# Patient Record
Sex: FEMALE | Race: WHITE | NOT HISPANIC OR LATINO | Employment: FULL TIME | URBAN - METROPOLITAN AREA
[De-identification: names, ages, dates, MRNs, and addresses within clinical notes are randomized per-mention and may not be internally consistent; named-entity substitution may affect disease eponyms.]

---

## 2018-08-15 RX ORDER — CYCLOSPORINE 0.5 MG/ML
EMULSION OPHTHALMIC
Qty: 60 ML | Refills: 2 | OUTPATIENT
Start: 2018-08-15

## 2018-08-24 RX ORDER — CYCLOSPORINE 0.5 MG/ML
EMULSION OPHTHALMIC
Qty: 60 ML | Refills: 0 | OUTPATIENT
Start: 2018-08-24

## 2018-08-27 RX ORDER — CYCLOSPORINE 0.5 MG/ML
EMULSION OPHTHALMIC
Qty: 60 ML | Refills: 1 | OUTPATIENT
Start: 2018-08-27

## 2020-02-11 ENCOUNTER — TELEPHONE (OUTPATIENT)
Dept: CARDIOLOGY CLINIC | Facility: CLINIC | Age: 50
End: 2020-02-11

## 2020-02-12 NOTE — PROGRESS NOTES
Consultation - Cardiology     Benigno Mccoy 47 y o  female MRN: [id number]    Assessment/Plan:    1  HL with high TG  She is taking Vascepa 2 g bid since 12/19  Last TG she believes were around 380, this was before she started Vascepa  TG in 7/04 were 548, total 153, HDL 12  Lipid panel 12/11 total 154, HDL 9,   Lipid panel 3/12 total 175, HDL 7, , LDL 95  She reports Niacin caused hives/wheals  Regular fish oil caused burping and she couldn't tolerate it  She believes one of the statins caused diarrhea, can't recall which one  Will recheck lipid panel in 3/20, if TG still high, could consider addition of fenofibrate, she does not believe she has ever tried that  2  Family history of CAD in father  Currently not having any exertional symptoms which would require stress testing, but did advise modification of risk factors such as HL  3  Palpitations/SVT  Currently symptoms occurring at night, about 2 times a week on average  Does not believe she snores  Will order Zio patch to attempt to catch one of these episodes to rule out arrhythmias such as afib/flutter  1  Family history of early CAD  POCT ECG   2  Mixed dyslipidemia     3  Palpitations  AMB extended holter monitor   4  SVT (supraventricular tachycardia) (HCC)  AMB extended holter monitor    Lipid Panel with Direct LDL reflex       HPI: 47 y o  female who is referred by Dr Nyasia Wayne here for evaluation of fast heart beats/tachycardia  She reports since 12/19 she has been noticing palpitations that wake her from sleep, last 5 minutes to 30 minutes  Just feels like heart is beating faster, not irregular, and she doesn't feel well  No associated chest pain, SOB, nausea, diaphoresis  Not occurring daily, but maybe twice a week  She is exercising in the form of walking at lunch, walks for 30 minutes, no exertional CP or SOB  No orthopnea, uses 2 pillows to sleep, no PND  She has mild asthma  No LE edema   She has chronic insomnia which she feels began when she began having issues with hypothyroidism  Feels her insomnia is worse than previously  Still has her period  Does not believe she snores  Father had 2 MIs, first at age 79, second at 80  He had HL/high TG  Mother had DM, HTN, colon cancer  Review of Systems:    Review of Systems   Constitution: Positive for night sweats  Negative for chills, decreased appetite, diaphoresis, fever, malaise/fatigue, weight gain and weight loss  HENT: Negative for ear pain, hearing loss, hoarse voice, nosebleeds, sore throat and tinnitus  Eyes: Negative for blurred vision and pain  Cardiovascular: Negative  Negative for chest pain, claudication, cyanosis, dyspnea on exertion, irregular heartbeat, leg swelling, near-syncope, orthopnea, palpitations, paroxysmal nocturnal dyspnea and syncope  Respiratory: Negative for cough, hemoptysis, shortness of breath, sleep disturbances due to breathing, snoring, sputum production and wheezing  Hematologic/Lymphatic: Negative for adenopathy and bleeding problem  Does not bruise/bleed easily  Skin: Negative for color change, dry skin, flushing, itching, poor wound healing and rash  Musculoskeletal: Negative for arthritis, back pain, falls, joint pain, muscle cramps, muscle weakness, myalgias and neck pain  Gastrointestinal: Negative for abdominal pain, constipation, diarrhea, dysphagia, heartburn, hematemesis, hematochezia, melena, nausea and vomiting  Genitourinary: Negative for dysuria, frequency, hematuria, hesitancy, non-menstrual bleeding and urgency  Neurological: Negative for excessive daytime sleepiness, dizziness, focal weakness, headaches, light-headedness, loss of balance, numbness, paresthesias, tremors, vertigo and weakness  Psychiatric/Behavioral: Negative for altered mental status, depression and memory loss  The patient has insomnia  The patient is not nervous/anxious      Allergic/Immunologic: Negative for environmental allergies and persistent infections  Active Problems: There is no problem list on file for this patient  Historical Information   Past Medical History:   Diagnosis Date    Asthma     Hyperlipemia      History reviewed  No pertinent surgical history  Social History     Substance and Sexual Activity   Alcohol Use Yes    Comment: social      Social History     Substance and Sexual Activity   Drug Use Never     Social History     Tobacco Use   Smoking Status Former Smoker   Smokeless Tobacco Never Used       Family History:   Family History   Problem Relation Age of Onset    Colon cancer Mother     Diabetes Mother     Heart disease Father     Hyperlipidemia Father        No Known Allergies      Current Outpatient Medications:     ferrous sulfate 325 (65 Fe) mg tablet, Take 325 mg by mouth daily with breakfast, Disp: , Rfl:     Icosapent Ethyl (VASCEPA) 1 g CAPS, Take 1 g by mouth, Disp: , Rfl:     levothyroxine 50 mcg tablet, Take 50 mcg by mouth daily, Disp: , Rfl:     Objective   Vitals:   Vitals:    02/13/20 0823   BP: 128/82   BP Location: Left arm   Patient Position: Sitting   Cuff Size: Standard   Pulse: 81   SpO2: 98%   Weight: 90 7 kg (200 lb)   Height: 5' 6" (1 676 m)          Physical Exam:     GEN: Alert and oriented x 3, in no acute distress  Well appearing and well nourished  HEENT: Sclera anicteric, conjunctivae pink, mucous membranes moist  Oropharynx clear  NECK: Supple, no carotid bruits, no significant JVD  Trachea midline, no thyromegaly  HEART: Regular rhythm, normal S1 and S2, no murmurs, clicks, gallops or rubs  PMI nondisplaced, no thrills  LUNGS: Clear to auscultation bilaterally; no wheezes, rales, or rhonchi  No increased work of breathing or signs of respiratory distress  ABDOMEN: Obese, soft, nontender, nondistended, normoactive bowel sounds  EXTREMITIES: Skin warm and well perfused, no clubbing, cyanosis, or edema  NEURO: No focal findings   Normal gait  Normal speech  Mood and affect normal    SKIN: Normal without suspicious lesions on exposed skin      Lab Results:     No results found for: HGBA1C  No results found for: CHOL  No results found for: HDL  No results found for: LDLCALC  No results found for: TRIG  No results found for: CHOLHDL

## 2020-02-13 ENCOUNTER — TELEPHONE (OUTPATIENT)
Dept: CARDIOLOGY CLINIC | Facility: CLINIC | Age: 50
End: 2020-02-13

## 2020-02-13 ENCOUNTER — OFFICE VISIT (OUTPATIENT)
Dept: CARDIOLOGY CLINIC | Facility: CLINIC | Age: 50
End: 2020-02-13
Payer: COMMERCIAL

## 2020-02-13 VITALS
OXYGEN SATURATION: 98 % | WEIGHT: 200 LBS | BODY MASS INDEX: 32.14 KG/M2 | DIASTOLIC BLOOD PRESSURE: 82 MMHG | SYSTOLIC BLOOD PRESSURE: 128 MMHG | HEART RATE: 81 BPM | HEIGHT: 66 IN

## 2020-02-13 DIAGNOSIS — E78.2 MIXED DYSLIPIDEMIA: ICD-10-CM

## 2020-02-13 DIAGNOSIS — I47.1 SVT (SUPRAVENTRICULAR TACHYCARDIA) (HCC): ICD-10-CM

## 2020-02-13 DIAGNOSIS — R00.2 PALPITATIONS: ICD-10-CM

## 2020-02-13 DIAGNOSIS — Z82.49 FAMILY HISTORY OF EARLY CAD: Primary | ICD-10-CM

## 2020-02-13 PROCEDURE — 93000 ELECTROCARDIOGRAM COMPLETE: CPT | Performed by: INTERNAL MEDICINE

## 2020-02-13 PROCEDURE — 99244 OFF/OP CNSLTJ NEW/EST MOD 40: CPT | Performed by: INTERNAL MEDICINE

## 2020-02-13 RX ORDER — LEVOTHYROXINE SODIUM 0.05 MG/1
50 TABLET ORAL DAILY
COMMUNITY
End: 2020-04-26

## 2020-02-13 RX ORDER — ICOSAPENT ETHYL 1000 MG/1
1 CAPSULE ORAL
COMMUNITY
End: 2020-06-17

## 2020-02-13 RX ORDER — FERROUS SULFATE 325(65) MG
325 TABLET ORAL
COMMUNITY
End: 2021-04-14

## 2020-02-13 NOTE — LETTER
February 13, 2020     Arun Martins MD  [address]  [address] [facility] [address]    Patient: Beni Magallon   YOB: 1970   Date of Visit: 2/13/2020       Dear Dr Cat Higgins:    Thank you for referring Tre Barrios to me for evaluation  Below are my notes for this consultation  If you have questions, please do not hesitate to call me  I look forward to following your patient along with you  Sincerely,        Nakul Richter MD        CC: No Recipients  Nakul Richter MD  2/13/2020  9:00 AM  Sign at close encounter  Consultation - Cardiology     Bein Magallon 47 y o  female MRN: [id number]    Assessment/Plan:    1  HL with high TG  She is taking Vascepa 2 g bid since 12/19  Last TG she believes were around 380, this was before she started Vascepa  TG in 7/04 were 548, total 153, HDL 12  Lipid panel 12/11 total 154, HDL 9,   Lipid panel 3/12 total 175, HDL 7, , LDL 95  She reports Niacin caused hives/wheals  Regular fish oil caused burping and she couldn't tolerate it  She believes one of the statins caused diarrhea, can't recall which one  Will recheck lipid panel in 3/20, if TG still high, could consider addition of fenofibrate, she does not believe she has ever tried that  2  Family history of CAD in father  Currently not having any exertional symptoms which would require stress testing, but did advise modification of risk factors such as HL  3  Palpitations/SVT  Currently symptoms occurring at night, about 2 times a week on average  Does not believe she snores  Will order Zio patch to attempt to catch one of these episodes to rule out arrhythmias such as afib/flutter  1  Family history of early CAD  POCT ECG   2  Mixed dyslipidemia     3  Palpitations  AMB extended holter monitor   4   SVT (supraventricular tachycardia) (HCC)  AMB extended holter monitor    Lipid Panel with Direct LDL reflex       HPI: 47 y o  female who is referred by Dr Cat Higgins here for [facility]: [phone]     [facility] Medication Refill Request Information:  * Please contact your pharmacy regarding ANY request for medication refills.  ** [facility] Prescription Fax = [phone]  * Please allow 3 business days for routine medication refills.  * Please allow 5 business days for controlled substance medication refills.     [facility] Test Result notification information:  *You will be notified with in 7-10 days of your appointment day regarding the results of your test.  If you are on MyChart you will be notified as soon as the provider has reviewed the results and signed off on them.   evaluation of fast heart beats/tachycardia  She reports since 12/19 she has been noticing palpitations that wake her from sleep, last 5 minutes to 30 minutes  Just feels like heart is beating faster, not irregular, and she doesn't feel well  No associated chest pain, SOB, nausea, diaphoresis  Not occurring daily, but maybe twice a week  She is exercising in the form of walking at lunch, walks for 30 minutes, no exertional CP or SOB  No orthopnea, uses 2 pillows to sleep, no PND  She has mild asthma  No LE edema  She has chronic insomnia which she feels began when she began having issues with hypothyroidism  Feels her insomnia is worse than previously  Still has her period  Does not believe she snores  Father had 2 MIs, first at age 79, second at 80  He had HL/high TG  Mother had DM, HTN, colon cancer  Review of Systems:    Review of Systems   Constitution: Positive for night sweats  Negative for chills, decreased appetite, diaphoresis, fever, malaise/fatigue, weight gain and weight loss  HENT: Negative for ear pain, hearing loss, hoarse voice, nosebleeds, sore throat and tinnitus  Eyes: Negative for blurred vision and pain  Cardiovascular: Negative  Negative for chest pain, claudication, cyanosis, dyspnea on exertion, irregular heartbeat, leg swelling, near-syncope, orthopnea, palpitations, paroxysmal nocturnal dyspnea and syncope  Respiratory: Negative for cough, hemoptysis, shortness of breath, sleep disturbances due to breathing, snoring, sputum production and wheezing  Hematologic/Lymphatic: Negative for adenopathy and bleeding problem  Does not bruise/bleed easily  Skin: Negative for color change, dry skin, flushing, itching, poor wound healing and rash  Musculoskeletal: Negative for arthritis, back pain, falls, joint pain, muscle cramps, muscle weakness, myalgias and neck pain     Gastrointestinal: Negative for abdominal pain, constipation, diarrhea, dysphagia, heartburn, hematemesis, hematochezia, melena, nausea and vomiting  Genitourinary: Negative for dysuria, frequency, hematuria, hesitancy, non-menstrual bleeding and urgency  Neurological: Negative for excessive daytime sleepiness, dizziness, focal weakness, headaches, light-headedness, loss of balance, numbness, paresthesias, tremors, vertigo and weakness  Psychiatric/Behavioral: Negative for altered mental status, depression and memory loss  The patient has insomnia  The patient is not nervous/anxious  Allergic/Immunologic: Negative for environmental allergies and persistent infections  Active Problems: There is no problem list on file for this patient  Historical Information   Past Medical History:   Diagnosis Date    Asthma     Hyperlipemia      History reviewed  No pertinent surgical history  Social History     Substance and Sexual Activity   Alcohol Use Yes    Comment: social      Social History     Substance and Sexual Activity   Drug Use Never     Social History     Tobacco Use   Smoking Status Former Smoker   Smokeless Tobacco Never Used       Family History:   Family History   Problem Relation Age of Onset    Colon cancer Mother     Diabetes Mother     Heart disease Father     Hyperlipidemia Father        No Known Allergies      Current Outpatient Medications:     ferrous sulfate 325 (65 Fe) mg tablet, Take 325 mg by mouth daily with breakfast, Disp: , Rfl:     Icosapent Ethyl (VASCEPA) 1 g CAPS, Take 1 g by mouth, Disp: , Rfl:     levothyroxine 50 mcg tablet, Take 50 mcg by mouth daily, Disp: , Rfl:     Objective   Vitals:   Vitals:    02/13/20 0823   BP: 128/82   BP Location: Left arm   Patient Position: Sitting   Cuff Size: Standard   Pulse: 81   SpO2: 98%   Weight: 90 7 kg (200 lb)   Height: 5' 6" (1 676 m)          Physical Exam:     GEN: Alert and oriented x 3, in no acute distress  Well appearing and well nourished     HEENT: Sclera anicteric, conjunctivae pink, mucous membranes moist  Oropharynx clear  NECK: Supple, no carotid bruits, no significant JVD  Trachea midline, no thyromegaly  HEART: Regular rhythm, normal S1 and S2, no murmurs, clicks, gallops or rubs  PMI nondisplaced, no thrills  LUNGS: Clear to auscultation bilaterally; no wheezes, rales, or rhonchi  No increased work of breathing or signs of respiratory distress  ABDOMEN: Obese, soft, nontender, nondistended, normoactive bowel sounds  EXTREMITIES: Skin warm and well perfused, no clubbing, cyanosis, or edema  NEURO: No focal findings  Normal gait  Normal speech  Mood and affect normal    SKIN: Normal without suspicious lesions on exposed skin      Lab Results:     No results found for: HGBA1C  No results found for: CHOL  No results found for: HDL  No results found for: LDLCALC  No results found for: TRIG  No results found for: CHOLHDL

## 2020-02-14 ENCOUNTER — TELEPHONE (OUTPATIENT)
Dept: CARDIOLOGY CLINIC | Facility: CLINIC | Age: 50
End: 2020-02-14

## 2020-02-26 ENCOUNTER — CLINICAL SUPPORT (OUTPATIENT)
Dept: CARDIOLOGY CLINIC | Facility: CLINIC | Age: 50
End: 2020-02-26
Payer: COMMERCIAL

## 2020-02-26 DIAGNOSIS — R00.2 PALPITATIONS: ICD-10-CM

## 2020-02-26 PROCEDURE — 0296T PR EXT ECG > 48HR TO 21 DAY RCRD W/CONECT INTL RCRD: CPT | Performed by: INTERNAL MEDICINE

## 2020-03-13 ENCOUNTER — CLINICAL SUPPORT (OUTPATIENT)
Dept: CARDIOLOGY CLINIC | Facility: CLINIC | Age: 50
End: 2020-03-13
Payer: COMMERCIAL

## 2020-03-13 DIAGNOSIS — I47.1 SVT (SUPRAVENTRICULAR TACHYCARDIA) (HCC): ICD-10-CM

## 2020-03-13 DIAGNOSIS — R00.2 PALPITATIONS: ICD-10-CM

## 2020-03-13 PROCEDURE — 0298T PR EXT ECG > 48HR TO 21 DAY REVIEW AND INTERPRETATN: CPT | Performed by: INTERNAL MEDICINE

## 2020-03-17 NOTE — RESULT ENCOUNTER NOTE
Patient wore Zio patch for 7 days in 2/20-3/20  Patient was in sinus rhythm with minimum HR of 56 bpm, maximum HR of 159 bpm, and average HR of 85  bpm  Isolated SVEs were rare (<1 0%), SVE Couplets were rare (<1 0%), and no SVE Triplets were present  Isolated VEs were rare (<1 0%), VE Couplets were rare  (<1 0%), and no VE Triplets were present  Patient reported pounding, during which monitor showed sinus rhythm without arrhythmias or ectopy  Overall normal 7 day monitor without significant arrhythmias to correlate with symptoms

## 2020-04-13 ENCOUNTER — TELEPHONE (OUTPATIENT)
Dept: CARDIOLOGY CLINIC | Facility: CLINIC | Age: 50
End: 2020-04-13

## 2020-04-15 ENCOUNTER — TELEMEDICINE (OUTPATIENT)
Dept: CARDIOLOGY CLINIC | Facility: CLINIC | Age: 50
End: 2020-04-15
Payer: COMMERCIAL

## 2020-04-15 VITALS — BODY MASS INDEX: 31.15 KG/M2 | WEIGHT: 193 LBS

## 2020-04-15 DIAGNOSIS — Z82.49 FAMILY HISTORY OF EARLY CAD: ICD-10-CM

## 2020-04-15 DIAGNOSIS — E78.5 DYSLIPIDEMIA: Primary | ICD-10-CM

## 2020-04-15 DIAGNOSIS — R00.2 PALPITATION: ICD-10-CM

## 2020-04-15 PROCEDURE — 99213 OFFICE O/P EST LOW 20 MIN: CPT | Performed by: INTERNAL MEDICINE

## 2020-04-26 DIAGNOSIS — E03.9 ACQUIRED HYPOTHYROIDISM: Primary | ICD-10-CM

## 2020-04-26 RX ORDER — LEVOTHYROXINE SODIUM 0.05 MG/1
TABLET ORAL
Qty: 90 TABLET | Refills: 1 | Status: SHIPPED | OUTPATIENT
Start: 2020-04-26 | End: 2020-10-16

## 2020-06-17 DIAGNOSIS — E78.5 DYSLIPIDEMIA: Primary | ICD-10-CM

## 2020-06-17 RX ORDER — ICOSAPENT ETHYL 1000 MG/1
CAPSULE ORAL
Qty: 120 CAPSULE | Refills: 3 | Status: SHIPPED | OUTPATIENT
Start: 2020-06-17 | End: 2020-09-25

## 2020-09-25 DIAGNOSIS — E78.5 DYSLIPIDEMIA: ICD-10-CM

## 2020-09-25 RX ORDER — ICOSAPENT ETHYL 1000 MG/1
CAPSULE ORAL
Qty: 120 CAPSULE | Refills: 3 | Status: SHIPPED | OUTPATIENT
Start: 2020-09-25 | End: 2021-01-21

## 2020-10-16 DIAGNOSIS — E03.9 ACQUIRED HYPOTHYROIDISM: ICD-10-CM

## 2020-10-16 RX ORDER — LEVOTHYROXINE SODIUM 0.05 MG/1
TABLET ORAL
Qty: 90 TABLET | Refills: 1 | Status: SHIPPED | OUTPATIENT
Start: 2020-10-16 | End: 2021-04-14 | Stop reason: SDUPTHER

## 2020-10-19 ENCOUNTER — ANNUAL EXAM (OUTPATIENT)
Dept: OBGYN CLINIC | Facility: CLINIC | Age: 50
End: 2020-10-19
Payer: COMMERCIAL

## 2020-10-19 ENCOUNTER — TELEPHONE (OUTPATIENT)
Dept: SURGICAL ONCOLOGY | Facility: CLINIC | Age: 50
End: 2020-10-19

## 2020-10-19 VITALS
DIASTOLIC BLOOD PRESSURE: 90 MMHG | BODY MASS INDEX: 32.21 KG/M2 | TEMPERATURE: 97.3 F | SYSTOLIC BLOOD PRESSURE: 160 MMHG | HEIGHT: 66 IN | WEIGHT: 200.4 LBS

## 2020-10-19 DIAGNOSIS — Z80.9 FAMILY HISTORY OF CANCER: ICD-10-CM

## 2020-10-19 DIAGNOSIS — R59.9 ENLARGED LYMPH NODE: ICD-10-CM

## 2020-10-19 DIAGNOSIS — Z12.4 SCREENING FOR CERVICAL CANCER: ICD-10-CM

## 2020-10-19 DIAGNOSIS — Z11.51 SCREENING FOR HUMAN PAPILLOMAVIRUS: ICD-10-CM

## 2020-10-19 DIAGNOSIS — Z01.419 ROUTINE GYNECOLOGICAL EXAMINATION: Primary | ICD-10-CM

## 2020-10-19 DIAGNOSIS — N92.0 MENORRHAGIA WITH REGULAR CYCLE: ICD-10-CM

## 2020-10-19 PROCEDURE — S0610 ANNUAL GYNECOLOGICAL EXAMINA: HCPCS | Performed by: OBSTETRICS & GYNECOLOGY

## 2020-10-19 PROCEDURE — G0145 SCR C/V CYTO,THINLAYER,RESCR: HCPCS | Performed by: OBSTETRICS & GYNECOLOGY

## 2020-10-19 PROCEDURE — 87624 HPV HI-RISK TYP POOLED RSLT: CPT | Performed by: OBSTETRICS & GYNECOLOGY

## 2020-10-24 LAB
HPV HR 12 DNA CVX QL NAA+PROBE: NEGATIVE
HPV16 DNA CVX QL NAA+PROBE: NEGATIVE
HPV18 DNA CVX QL NAA+PROBE: NEGATIVE

## 2020-10-26 LAB
LAB AP GYN PRIMARY INTERPRETATION: NORMAL
LAB AP LMP: NORMAL
Lab: NORMAL

## 2020-10-27 ENCOUNTER — TELEPHONE (OUTPATIENT)
Dept: HEMATOLOGY ONCOLOGY | Facility: CLINIC | Age: 50
End: 2020-10-27

## 2020-10-29 ENCOUNTER — HOSPITAL ENCOUNTER (OUTPATIENT)
Dept: RADIOLOGY | Facility: HOSPITAL | Age: 50
Discharge: HOME | End: 2020-10-29
Attending: RADIOLOGY

## 2020-10-29 ENCOUNTER — TELEPHONE (OUTPATIENT)
Dept: SURGICAL ONCOLOGY | Facility: CLINIC | Age: 50
End: 2020-10-29

## 2020-10-29 DIAGNOSIS — Z71.2 PERSON CONSULTING FOR EXPLANATION OF EXAMINATION OR TEST FINDING: ICD-10-CM

## 2020-11-02 ENCOUNTER — HOSPITAL ENCOUNTER (OUTPATIENT)
Dept: ULTRASOUND IMAGING | Facility: HOSPITAL | Age: 50
Discharge: HOME | End: 2020-11-02
Attending: OBSTETRICS & GYNECOLOGY
Payer: COMMERCIAL

## 2020-11-02 DIAGNOSIS — Z12.4 SCREENING FOR CERVICAL CANCER: ICD-10-CM

## 2020-11-02 PROCEDURE — 76856 US EXAM PELVIC COMPLETE: CPT

## 2020-11-02 PROCEDURE — 76830 TRANSVAGINAL US NON-OB: CPT

## 2020-11-03 ENCOUNTER — HOSPITAL ENCOUNTER (OUTPATIENT)
Dept: MAMMOGRAPHY | Facility: CLINIC | Age: 50
Discharge: HOME | End: 2020-11-03
Payer: COMMERCIAL

## 2020-11-03 ENCOUNTER — HOSPITAL ENCOUNTER (OUTPATIENT)
Dept: ULTRASOUND IMAGING | Facility: CLINIC | Age: 50
Discharge: HOME | End: 2020-11-03
Payer: COMMERCIAL

## 2020-11-03 VITALS — HEIGHT: 66 IN | BODY MASS INDEX: 32.14 KG/M2 | WEIGHT: 200 LBS | TEMPERATURE: 95.9 F

## 2020-11-03 DIAGNOSIS — R59.9 ENLARGED LYMPH NODE: ICD-10-CM

## 2020-11-03 PROCEDURE — 76642 ULTRASOUND BREAST LIMITED: CPT

## 2020-11-03 PROCEDURE — 77066 DX MAMMO INCL CAD BI: CPT

## 2020-11-03 PROCEDURE — G0279 TOMOSYNTHESIS, MAMMO: HCPCS

## 2020-12-01 ENCOUNTER — PROCEDURE VISIT (OUTPATIENT)
Dept: OBGYN CLINIC | Facility: CLINIC | Age: 50
End: 2020-12-01
Payer: COMMERCIAL

## 2020-12-01 VITALS
DIASTOLIC BLOOD PRESSURE: 82 MMHG | BODY MASS INDEX: 30.95 KG/M2 | SYSTOLIC BLOOD PRESSURE: 130 MMHG | HEIGHT: 66 IN | WEIGHT: 192.6 LBS

## 2020-12-01 DIAGNOSIS — N92.0 MENORRHAGIA WITH REGULAR CYCLE: Primary | ICD-10-CM

## 2020-12-01 PROCEDURE — 88305 TISSUE EXAM BY PATHOLOGIST: CPT | Performed by: PATHOLOGY

## 2020-12-01 PROCEDURE — 58100 BIOPSY OF UTERUS LINING: CPT | Performed by: OBSTETRICS & GYNECOLOGY

## 2020-12-01 RX ORDER — TRANEXAMIC ACID 650 1/1
1300 TABLET ORAL 3 TIMES DAILY
Qty: 30 TABLET | Refills: 3 | Status: SHIPPED | OUTPATIENT
Start: 2020-12-01 | End: 2020-12-06

## 2020-12-07 ENCOUNTER — CONSULT (OUTPATIENT)
Dept: SURGICAL ONCOLOGY | Facility: CLINIC | Age: 50
End: 2020-12-07
Payer: COMMERCIAL

## 2020-12-07 VITALS
DIASTOLIC BLOOD PRESSURE: 80 MMHG | WEIGHT: 193 LBS | BODY MASS INDEX: 31.02 KG/M2 | TEMPERATURE: 96.4 F | HEIGHT: 66 IN | SYSTOLIC BLOOD PRESSURE: 100 MMHG | HEART RATE: 81 BPM | RESPIRATION RATE: 16 BRPM

## 2020-12-07 DIAGNOSIS — R22.31 MASS OF RIGHT AXILLA: Primary | ICD-10-CM

## 2020-12-07 DIAGNOSIS — Z80.3 FAMILY HISTORY OF BREAST CANCER: ICD-10-CM

## 2020-12-07 PROCEDURE — 99244 OFF/OP CNSLTJ NEW/EST MOD 40: CPT | Performed by: NURSE PRACTITIONER

## 2020-12-07 RX ORDER — ZOLPIDEM TARTRATE 10 MG/1
10 TABLET ORAL
COMMUNITY
End: 2021-04-14

## 2021-01-21 DIAGNOSIS — E78.5 DYSLIPIDEMIA: ICD-10-CM

## 2021-01-21 RX ORDER — ICOSAPENT ETHYL 1000 MG/1
CAPSULE ORAL
Qty: 120 CAPSULE | Refills: 3 | Status: SHIPPED | OUTPATIENT
Start: 2021-01-21 | End: 2021-04-14

## 2021-04-01 ENCOUNTER — TELEPHONE (OUTPATIENT)
Dept: FAMILY MEDICINE CLINIC | Facility: CLINIC | Age: 51
End: 2021-04-01

## 2021-04-01 NOTE — TELEPHONE ENCOUNTER
Patient was exposed to Covid by her brother in law  She is going to [facility] to get tested  She just wants to keep you in the loop in case it comes up positive

## 2021-04-06 ENCOUNTER — TELEPHONE (OUTPATIENT)
Dept: FAMILY MEDICINE CLINIC | Facility: CLINIC | Age: 51
End: 2021-04-06

## 2021-04-06 DIAGNOSIS — E03.9 ACQUIRED HYPOTHYROIDISM: ICD-10-CM

## 2021-04-06 RX ORDER — LEVOTHYROXINE SODIUM 0.05 MG/1
TABLET ORAL
Qty: 90 TABLET | Refills: 0 | OUTPATIENT
Start: 2021-04-06

## 2021-04-14 ENCOUNTER — OFFICE VISIT (OUTPATIENT)
Dept: FAMILY MEDICINE CLINIC | Facility: CLINIC | Age: 51
End: 2021-04-14
Payer: COMMERCIAL

## 2021-04-14 VITALS
BODY MASS INDEX: 32.02 KG/M2 | DIASTOLIC BLOOD PRESSURE: 80 MMHG | TEMPERATURE: 97.2 F | WEIGHT: 199.25 LBS | HEIGHT: 66 IN | HEART RATE: 68 BPM | OXYGEN SATURATION: 98 % | RESPIRATION RATE: 16 BRPM | SYSTOLIC BLOOD PRESSURE: 130 MMHG

## 2021-04-14 DIAGNOSIS — E03.9 ACQUIRED HYPOTHYROIDISM: ICD-10-CM

## 2021-04-14 DIAGNOSIS — Z00.00 ANNUAL PHYSICAL EXAM: Primary | ICD-10-CM

## 2021-04-14 DIAGNOSIS — R03.0 ELEVATED BLOOD PRESSURE READING: ICD-10-CM

## 2021-04-14 DIAGNOSIS — Z11.4 SCREENING FOR HIV (HUMAN IMMUNODEFICIENCY VIRUS): ICD-10-CM

## 2021-04-14 DIAGNOSIS — Z12.4 SCREENING FOR CERVICAL CANCER: ICD-10-CM

## 2021-04-14 DIAGNOSIS — Z80.0 FAMILY HISTORY OF COLON CANCER: ICD-10-CM

## 2021-04-14 DIAGNOSIS — Z28.21 COVID-19 VIRUS VACCINATION DECLINED: ICD-10-CM

## 2021-04-14 DIAGNOSIS — Z80.3 FAMILY HISTORY OF BREAST CANCER: ICD-10-CM

## 2021-04-14 PROCEDURE — 1036F TOBACCO NON-USER: CPT | Performed by: INTERNAL MEDICINE

## 2021-04-14 PROCEDURE — 3008F BODY MASS INDEX DOCD: CPT | Performed by: INTERNAL MEDICINE

## 2021-04-14 PROCEDURE — 99396 PREV VISIT EST AGE 40-64: CPT | Performed by: INTERNAL MEDICINE

## 2021-04-14 PROCEDURE — 3725F SCREEN DEPRESSION PERFORMED: CPT | Performed by: INTERNAL MEDICINE

## 2021-04-14 RX ORDER — LEVOTHYROXINE SODIUM 0.05 MG/1
50 TABLET ORAL DAILY
Qty: 90 TABLET | Refills: 3 | Status: SHIPPED | OUTPATIENT
Start: 2021-04-14 | End: 2022-03-28

## 2021-04-14 NOTE — PROGRESS NOTES
[facility]    NAME: Tony Sánchez  AGE: 45 y o  SEX: female  : 1970     DATE: 2021     Assessment and Plan:     Problem List Items Addressed This Visit        Endocrine    Acquired hypothyroidism    Relevant Medications    levothyroxine 50 mcg tablet    Other Relevant Orders    CBC and differential    Comprehensive metabolic panel    Lipid panel    TSH, 3rd generation      Other Visit Diagnoses     Annual physical exam    -  Primary    Relevant Orders    CBC and differential    Comprehensive metabolic panel    Lipid panel    TSH, 3rd generation    BMI 32 0-32 9,adult        COVID-19 virus vaccination declined        Elevated blood pressure reading        Counseled on sodium reduction, exercise, will monitor    Screening for HIV (human immunodeficiency virus)        Relevant Orders    HIV 1/2 Antigen/Antibody (4th Generation) w Reflex [facility]          Immunizations and preventive care screenings were discussed with patient today  Appropriate education was printed on patient's after visit summary  Counseling:  Alcohol/drug use: discussed moderation in alcohol intake, the recommendations for healthy alcohol use, and avoidance of illicit drug use  Dental Health: discussed importance of regular tooth brushing, flossing, and dental visits  Injury prevention: discussed safety/seat belts, safety helmets, smoke detectors, carbon dioxide detectors, and smoking near bedding or upholstery  Sexual health: discussed sexually transmitted diseases, partner selection, use of condoms, avoidance of unintended pregnancy, and contraceptive alternatives  · Exercise: the importance of regular exercise/physical activity was discussed  Recommend exercise 3-5 times per week for at least 30 minutes  Return in about 1 year (around 2022)       Chief Complaint:     Chief Complaint   Patient presents with   Marge Silva Annual Exam   Mrage Silva Medication Refill      History of Present Illness:     Adult Annual Physical   Patient here for a comprehensive physical exam  The patient reports no problems  Diet and Physical Activity  · Diet/Nutrition: well balanced diet  · Exercise: no formal exercise  Depression Screening  PHQ-9 Depression Screening    PHQ-9:   Frequency of the following problems over the past two weeks:      Little interest or pleasure in doing things: 0 - not at all  Feeling down, depressed, or hopeless: 0 - not at all  PHQ-2 Score: 0       General Health  · Sleep: sleeps well  · Hearing: normal - bilateral   · Vision: no vision problems  · Dental: regular dental visits  /GYN Health  · Patient is: perimenopausal  · Last menstrual period: not discussed  · Contraceptive method: not discussed  Review of Systems:     Review of Systems   Constitutional: Negative  HENT: Negative  Respiratory: Negative  Cardiovascular: Negative  Gastrointestinal: Negative  Endocrine: Negative  Musculoskeletal: Negative  Allergic/Immunologic: Negative  Neurological: Negative  Hematological: Negative  Psychiatric/Behavioral: Negative         Past Medical History:     Past Medical History:   Diagnosis Date    Anemia     Asthma     Hyperlipemia     triglycerides    Hypertriglyceridemia     Hypothyroidism     Impaired fasting glucose     Insomnia     Premenstrual tension syndrome       Past Surgical History:     Past Surgical History:   Procedure Laterality Date    COLONOSCOPY  2016    MAMMO (HISTORICAL) Bilateral 11/03/2020      Social History:     E-Cigarette/Vaping    E-Cigarette Use Never User      E-Cigarette/Vaping Substances    Nicotine No     THC No     CBD No     Flavoring No     Other No     Unknown No      Social History     Socioeconomic History    Marital status: /Civil Union     Spouse name: None    Number of children: None    Years of education: 4 year college    Highest education level: None   Occupational History    Occupation:    Social Needs    Financial resource strain: None    Food insecurity     Worry: None     Inability: None    Transportation needs     Medical: None     Non-medical: None   Tobacco Use    Smoking status: Former Smoker     Types: Cigarettes    Smokeless tobacco: Never Used    Tobacco comment: teenage    Substance and Sexual Activity    Alcohol use: Yes     Comment: social     Drug use: Never    Sexual activity: Yes     Partners: Male     Birth control/protection: Male Sterilization   Lifestyle    Physical activity     Days per week: None     Minutes per session: None    Stress: None   Relationships    Social connections     Talks on phone: None     Gets together: None     Attends [employer] service: None     Active member of club or organization: None     Attends meetings of clubs or organizations: None     Relationship status: None    Intimate partner violence     Fear of current or ex partner: None     Emotionally abused: None     Physically abused: None     Forced sexual activity: None   Other Topics Concern    None   Social History Narrative    · Most recent tobacco use screenin2018      · Do you currently or have you served in the "[employer]:   No      · Were you activated, into active duty, as a member of the [employer] or as a Reservist:   No      · General stress level:   High      · Exercise level:    Moderate      · Diet:   Regular      · Marital status:     since      · Sexual orientation:   Heterosexual      · Alcohol intake:   Occasional      · Live alone or with others:   with others      · Caffeine intake:   None      · Do you feel safe at home:   Yes       Family History:     Family History   Problem Relation Age of Onset    Colon cancer Mother 67    Diabetes Mother     Hypertension Mother     Heart disease Father     Hyperlipidemia Father     Heart attack Father     Heart failure Father     Alzheimer's disease Maternal Aunt     No Known Problems Son     No Known Problems Sister     Breast cancer Maternal Grandmother         52's ?  No Known Problems Maternal Grandfather     Brain cancer Paternal Grandmother         young age   Grisel Bustos No Known Problems Paternal Grandfather     No Known Problems Paternal Uncle       Current Medications:     Current Outpatient Medications   Medication Sig Dispense Refill    levothyroxine 50 mcg tablet Take 1 tablet (50 mcg total) by mouth daily 90 tablet 3     No current facility-administered medications for this visit  Allergies: Allergies   Allergen Reactions    Niacin Hives     In high doses   (Flushing)      Physical Exam:     /80   Pulse 68   Temp (!) 97 2 °F (36 2 °C) (Temporal)   Resp 16   Ht 5' 6" (1 676 m)   Wt 90 4 kg (199 lb 4 oz)   SpO2 98%   BMI 32 16 kg/m²     Physical Exam  Vitals signs and nursing note reviewed  Constitutional:       General: She is not in acute distress  Appearance: She is well-developed  HENT:      Head: Normocephalic and atraumatic  Right Ear: External ear normal       Left Ear: External ear normal       Mouth/Throat:      Pharynx: Oropharynx is clear  Eyes:      Conjunctiva/sclera: Conjunctivae normal    Neck:      Musculoskeletal: Normal range of motion and neck supple  Cardiovascular:      Rate and Rhythm: Normal rate and regular rhythm  Heart sounds: No murmur  Pulmonary:      Effort: Pulmonary effort is normal  No respiratory distress  Breath sounds: Normal breath sounds  Abdominal:      General: Abdomen is flat  Palpations: Abdomen is soft  Tenderness: There is no abdominal tenderness  Musculoskeletal: Normal range of motion  Skin:     General: Skin is warm and dry  Capillary Refill: Capillary refill takes less than 2 seconds  Neurological:      General: No focal deficit present        Mental Status: She is alert and oriented to person, place, and time  Psychiatric:         Behavior: Behavior normal          Thought Content: Thought content normal          Judgment: Judgment normal           Maribell Wilkins MD  [facility]     BMI Counseling: Body mass index is 32 16 kg/m²  The BMI is above normal  Nutrition recommendations include reducing portion sizes, decreasing overall calorie intake, 3-5 servings of fruits/vegetables daily, reducing fast food intake, consuming healthier snacks, decreasing soda and/or juice intake, moderation in carbohydrate intake, increasing intake of lean protein, reducing intake of saturated fat and trans fat and reducing intake of cholesterol

## 2021-04-14 NOTE — PATIENT INSTRUCTIONS

## 2021-06-08 ENCOUNTER — OFFICE VISIT (OUTPATIENT)
Dept: SURGICAL ONCOLOGY | Facility: CLINIC | Age: 51
End: 2021-06-08
Payer: COMMERCIAL

## 2021-06-08 VITALS
SYSTOLIC BLOOD PRESSURE: 138 MMHG | WEIGHT: 203 LBS | RESPIRATION RATE: 16 BRPM | HEIGHT: 66 IN | DIASTOLIC BLOOD PRESSURE: 90 MMHG | TEMPERATURE: 98 F | HEART RATE: 80 BPM | BODY MASS INDEX: 32.62 KG/M2

## 2021-06-08 DIAGNOSIS — R22.31 MASS OF RIGHT AXILLA: Primary | ICD-10-CM

## 2021-06-08 DIAGNOSIS — Z80.3 FAMILY HISTORY OF BREAST CANCER: ICD-10-CM

## 2021-06-08 PROCEDURE — 99213 OFFICE O/P EST LOW 20 MIN: CPT | Performed by: NURSE PRACTITIONER

## 2021-06-08 PROCEDURE — 3008F BODY MASS INDEX DOCD: CPT | Performed by: NURSE PRACTITIONER

## 2021-06-08 PROCEDURE — 1036F TOBACCO NON-USER: CPT | Performed by: NURSE PRACTITIONER

## 2021-06-08 NOTE — PROGRESS NOTES
Surgical Oncology Follow Up       [address] [facility],[address]  [facility] [address] [place] [address]    [facility]  1970  [id number]      Chief Complaint   Patient presents with    Follow-up     6 month        Assessment/Plan:  1  Mass of right axilla  - accessory breast tissue on imaging  - Stable on clinical exam  Call with changes    2  Family history of breast cancer      Discussion/Summary:  Patient is a 19-year-old female that presents today for a follow-up visit for a right axillary mass  She underwent diagnostic imaging which revealed accessory breast tissue in her right axilla  She states it is no longer tender and seems as if it has decreased in size  It is stable on her clinical exam today  We discussed continued follow up here given her family history of breast cancer in her grandmother  Patient prefers to continue CBE and mammography with her GYN  She will see her GYN in October and will be due for imaging in November  I will see her back on an as needed basis  She knows to contact me with any changes on self exam or any concerns in the future  All of her questions were answered today  History of Present Illness:     -Interval History:  Patient declined genetic testing  She feels the mass in her right axilla has decreased inside is and is no longer painful or tender to the touch  Review of Systems:  Review of Systems   Constitutional: Negative for chills, fatigue and fever  Respiratory: Negative for cough and shortness of breath  Cardiovascular: Negative for chest pain  Hematological: Negative for adenopathy  Psychiatric/Behavioral: Negative for confusion         Patient Active Problem List   Diagnosis    Mass of right axilla    Family history of breast cancer    Family history of colon cancer    Acquired hypothyroidism    Screening for cervical cancer     Past Medical History:   Diagnosis Date    Anemia     Asthma  Hyperlipemia     triglycerides    Hypertriglyceridemia     Hypothyroidism     Impaired fasting glucose     Insomnia     Premenstrual tension syndrome      Past Surgical History:   Procedure Laterality Date    COLONOSCOPY  2016    MAMMO (HISTORICAL) Bilateral 11/03/2020     Family History   Problem Relation Age of Onset    Colon cancer Mother 67    Diabetes Mother     Hypertension Mother     Heart disease Father     Hyperlipidemia Father     Heart attack Father     Heart failure Father     Alzheimer's disease Maternal Aunt     No Known Problems Son     No Known Problems Sister     Breast cancer Maternal Grandmother         52's ?     No Known Problems Maternal Grandfather     Brain cancer Paternal Grandmother         young age   Glass No Known Problems Paternal Grandfather     No Known Problems Paternal Uncle      Social History     Socioeconomic History    Marital status: /Civil Union     Spouse name: Not on file    Number of children: Not on file    Years of education: 4 year college    Highest education level: Not on file   Occupational History    Occupation:    Social Needs    Financial resource strain: Not on file    Food insecurity     Worry: Not on file     Inability: Not on file   [employer] needs     Medical: Not on file     Non-medical: Not on file   Tobacco Use    Smoking status: Former Smoker     Types: Cigarettes    Smokeless tobacco: Never Used    Tobacco comment: teenage    Substance and Sexual Activity    Alcohol use: Yes     Frequency: 2-3 times a week     Drinks per session: 1 or 2     Comment: social     Drug use: Never    Sexual activity: Yes     Partners: Male     Birth control/protection: Male Sterilization   Lifestyle    Physical activity     Days per week: Not on file     Minutes per session: Not on file    Stress: Not on file   Relationships    Social connections     Talks on phone: Not on file     Gets together: Not on file Attends Synagogue service: Not on file     Active member of club or organization: Not on file     Attends meetings of clubs or organizations: Not on file     Relationship status: Not on file    Intimate partner violence     Fear of current or ex partner: Not on file     Emotionally abused: Not on file     Physically abused: Not on file     Forced sexual activity: Not on file   Other Topics Concern    Not on file   Social History Narrative    · Most recent tobacco use screenin2018      · Do you currently or have you served in [employer]:   No      · Were you activated, into active duty, as a member of the [employer] or as a Reservist:   No      · General stress level:   High      · Exercise level: Moderate      · Diet:   Regular      · Marital status:     since      · Sexual orientation:   Heterosexual      · Alcohol intake:   Occasional      · Live alone or with others:   with others      · Caffeine intake:   None      · Do you feel safe at home:   Yes        Current Outpatient Medications:     levothyroxine 50 mcg tablet, Take 1 tablet (50 mcg total) by mouth daily, Disp: 90 tablet, Rfl: 3  Allergies   Allergen Reactions    Niacin Hives     In high doses   (Flushing)     Vitals:    21 1511   BP: 138/90   Pulse: 80   Resp: 16   Temp: 98 °F (36 7 °C)       Physical Exam  Vitals signs reviewed  Constitutional:       General: She is not in acute distress  Appearance: Normal appearance  She is well-developed  She is not diaphoretic  HENT:      Head: Normocephalic and atraumatic  Neck:      Musculoskeletal: Normal range of motion  Pulmonary:      Effort: Pulmonary effort is normal    Chest:      Breasts:         Right: No swelling, bleeding, inverted nipple, mass, nipple discharge, skin change or tenderness  Left: No swelling, bleeding, inverted nipple, mass, nipple discharge, skin change or tenderness  Musculoskeletal: Normal range of motion  Lymphadenopathy:      Upper Body:      Right upper body: No supraclavicular or axillary adenopathy  Left upper body: No supraclavicular or axillary adenopathy  Skin:     General: Skin is warm and dry  Findings: No rash  Neurological:      Mental Status: She is alert and oriented to person, place, and time  Psychiatric:         Speech: Speech normal              Advance Care Planning/Advance Directives:  Discussed disease status and treatment goals with the patient

## 2021-06-08 NOTE — PATIENT INSTRUCTIONS
Breast Self Exam for Women   AMBULATORY CARE:   A breast self-exam (BSE)  is a way to check your breasts for lumps and other changes  Regular BSEs can help you know how your breasts normally look and feel  Most breast lumps or changes are not cancer, but you should always have them checked by a healthcare provider  Why you should do a BSE:  Breast cancer is the most common type of cancer in women  Even if you have mammograms, you may still want to do a BSE regularly  If you know how your breasts normally feel and look, it may help you know when to contact your healthcare provider  Mammograms can miss some cancers  You may find a lump during a BSE that did not show up on a mammogram   When you should do a BSE:  If you have periods, you may want to do your BSE 1 week after your period ends  This is the time when your breasts may be the least swollen, lumpy, or tender  You can do regular BSEs even if you are breastfeeding or have breast implants  Call your doctor if:   · You find any lumps or changes in your breasts  · You have breast pain or fluid coming from your nipples  · You have questions or concerns about your condition or care  How to do a BSE:       · Look at your breasts in a mirror  Look at the size and shape of each breast and nipple  Check for swelling, lumps, dimpling, scaly skin, or other skin changes  Look for nipple changes, such as a nipple that is painful or beginning to pull inward  Gently squeeze both nipples and check to see if fluid (that is not breast milk) comes out of them  If you find any of these or other breast changes, contact your healthcare provider  Check your breasts while you sit or  the following 3 positions:    ? Hang your arms down at your sides  ? Raise your hands and join them behind your head  ? Put firm pressure with your hands on your hips  Bend slightly forward while you look at your breasts in the mirror  · Lie down and feel your breasts    When you lie down, your breast tissue spreads out evenly over your chest  This makes it easier for you to feel for lumps and anything that may not be normal for your breasts  Do a BSE on one breast at a time  ? Place a small pillow or towel under your left shoulder  Put your left arm behind your head  ? Use the 3 middle fingers of your right hand  Use your fingertip pads, on the top of your fingers  Your fingertip pad is the most sensitive part of your finger  ? Use small circles to feel your breast tissue  Use your fingertip pads to make dime-sized, overlapping circles on your breast and armpits  Use light, medium, and firm pressure  First, press lightly  Second, press with medium pressure to feel a little deeper into the breast  Last, use firm pressure to feel deep within your breast     ? Examine your entire breast area  Examine the breast area from above the breast to below the breast where you feel only ribs  Make small circles with your fingertips, starting in the middle of your armpit  Make circles going up and down the breast area  Continue toward your breast and all the way across it  Examine the area from your armpit all the way over to the middle of your chest (breastbone)  Stop at the middle of your chest     ? Move the pillow or towel to your right shoulder, and put your right arm behind your head  Use the 3 fingertip pads of your left hand, and repeat the above steps to do a BSE on your right breast   What else you can do to check for breast problems or cancer:  Talk to your healthcare provider about mammograms  A mammogram is an x-ray of your breasts to screen for breast cancer or other problems  Your provider can tell you the benefits and risks of mammograms  The first mammogram is usually at age 39 or 48  Your provider may recommend you start at 36 or younger if your risk for breast cancer is high  Mammograms usually continue every 1 to 2 years until age 76       Follow up with your doctor as directed:  Write down your questions so you remember to ask them during your visits  © Copyright [employer] 2021 Information is for End User's use only and may not be sold, redistributed or otherwise used for commercial purposes  All illustrations and images included in CareNotes® are the copyrighted property of [employer]  or [employer]  The above information is an  only  It is not intended as medical advice for individual conditions or treatments  Talk to your doctor, nurse or pharmacist before following any medical regimen to see if it is safe and effective for you

## 2021-06-22 ENCOUNTER — VBI (OUTPATIENT)
Dept: ADMINISTRATIVE | Facility: OTHER | Age: 51
End: 2021-06-22

## 2021-10-22 ENCOUNTER — OFFICE VISIT (OUTPATIENT)
Dept: OBGYN CLINIC | Facility: CLINIC | Age: 51
End: 2021-10-22
Payer: COMMERCIAL

## 2021-10-22 VITALS
DIASTOLIC BLOOD PRESSURE: 86 MMHG | BODY MASS INDEX: 32.95 KG/M2 | HEIGHT: 66 IN | WEIGHT: 205 LBS | SYSTOLIC BLOOD PRESSURE: 156 MMHG

## 2021-10-22 DIAGNOSIS — Z01.419 WOMEN'S ANNUAL ROUTINE GYNECOLOGICAL EXAMINATION: Primary | ICD-10-CM

## 2021-10-22 DIAGNOSIS — Z12.31 SCREENING MAMMOGRAM, ENCOUNTER FOR: ICD-10-CM

## 2021-10-22 PROCEDURE — S0612 ANNUAL GYNECOLOGICAL EXAMINA: HCPCS | Performed by: OBSTETRICS & GYNECOLOGY

## 2021-12-13 ENCOUNTER — TELEPHONE (OUTPATIENT)
Dept: FAMILY MEDICINE CLINIC | Facility: CLINIC | Age: 51
End: 2021-12-13

## 2021-12-17 ENCOUNTER — TELEPHONE (OUTPATIENT)
Dept: FAMILY MEDICINE CLINIC | Facility: CLINIC | Age: 51
End: 2021-12-17

## 2021-12-20 ENCOUNTER — TELEPHONE (OUTPATIENT)
Dept: FAMILY MEDICINE CLINIC | Facility: CLINIC | Age: 51
End: 2021-12-20

## 2021-12-22 ENCOUNTER — HOSPITAL ENCOUNTER (EMERGENCY)
Facility: HOSPITAL | Age: 51
Discharge: HOME | End: 2021-12-22
Attending: EMERGENCY MEDICINE | Admitting: EMERGENCY MEDICINE
Payer: COMMERCIAL

## 2021-12-22 ENCOUNTER — APPOINTMENT (EMERGENCY)
Dept: RADIOLOGY | Facility: HOSPITAL | Age: 51
End: 2021-12-22
Payer: COMMERCIAL

## 2021-12-22 VITALS
DIASTOLIC BLOOD PRESSURE: 68 MMHG | RESPIRATION RATE: 18 BRPM | TEMPERATURE: 100.6 F | SYSTOLIC BLOOD PRESSURE: 124 MMHG | HEART RATE: 98 BPM | OXYGEN SATURATION: 94 %

## 2021-12-22 DIAGNOSIS — J12.82 PNEUMONIA DUE TO COVID-19 VIRUS: Primary | ICD-10-CM

## 2021-12-22 DIAGNOSIS — R11.0 NAUSEA: ICD-10-CM

## 2021-12-22 DIAGNOSIS — U07.1 PNEUMONIA DUE TO COVID-19 VIRUS: Primary | ICD-10-CM

## 2021-12-22 LAB
ALBUMIN SERPL BCP-MCNC: 3.4 G/DL (ref 3.5–5)
ALP SERPL-CCNC: 57 U/L (ref 46–116)
ALT SERPL W P-5'-P-CCNC: 45 U/L (ref 12–78)
ANION GAP SERPL CALCULATED.3IONS-SCNC: 10 MMOL/L (ref 4–13)
AST SERPL W P-5'-P-CCNC: 48 U/L (ref 5–45)
BASOPHILS # BLD AUTO: 0.01 THOUSANDS/ΜL (ref 0–0.1)
BASOPHILS NFR BLD AUTO: 0 % (ref 0–1)
BILIRUB SERPL-MCNC: 0.28 MG/DL (ref 0.2–1)
BUN SERPL-MCNC: 7 MG/DL (ref 5–25)
CALCIUM ALBUM COR SERPL-MCNC: 9.1 MG/DL (ref 8.3–10.1)
CALCIUM SERPL-MCNC: 8.6 MG/DL (ref 8.3–10.1)
CARDIAC TROPONIN I PNL SERPL HS: 3 NG/L
CHLORIDE SERPL-SCNC: 98 MMOL/L (ref 100–108)
CO2 SERPL-SCNC: 24 MMOL/L (ref 21–32)
CREAT SERPL-MCNC: 0.95 MG/DL (ref 0.6–1.3)
EOSINOPHIL # BLD AUTO: 0 THOUSAND/ΜL (ref 0–0.61)
EOSINOPHIL NFR BLD AUTO: 0 % (ref 0–6)
ERYTHROCYTE [DISTWIDTH] IN BLOOD BY AUTOMATED COUNT: 15.2 % (ref 11.6–15.1)
FLUAV RNA RESP QL NAA+PROBE: NEGATIVE
FLUBV RNA RESP QL NAA+PROBE: NEGATIVE
GFR SERPL CREATININE-BSD FRML MDRD: 69 ML/MIN/1.73SQ M
GLUCOSE SERPL-MCNC: 118 MG/DL (ref 65–140)
HCT VFR BLD AUTO: 36.8 % (ref 34.8–46.1)
HGB BLD-MCNC: 11.5 G/DL (ref 11.5–15.4)
IMM GRANULOCYTES # BLD AUTO: 0.01 THOUSAND/UL (ref 0–0.2)
IMM GRANULOCYTES NFR BLD AUTO: 0 % (ref 0–2)
LYMPHOCYTES # BLD AUTO: 1.02 THOUSANDS/ΜL (ref 0.6–4.47)
LYMPHOCYTES NFR BLD AUTO: 19 % (ref 14–44)
MCH RBC QN AUTO: 25.4 PG (ref 26.8–34.3)
MCHC RBC AUTO-ENTMCNC: 31.3 G/DL (ref 31.4–37.4)
MCV RBC AUTO: 81 FL (ref 82–98)
MONOCYTES # BLD AUTO: 0.27 THOUSAND/ΜL (ref 0.17–1.22)
MONOCYTES NFR BLD AUTO: 5 % (ref 4–12)
NEUTROPHILS # BLD AUTO: 3.95 THOUSANDS/ΜL (ref 1.85–7.62)
NEUTS SEG NFR BLD AUTO: 76 % (ref 43–75)
NRBC BLD AUTO-RTO: 0 /100 WBCS
PLATELET # BLD AUTO: 120 THOUSANDS/UL (ref 149–390)
POTASSIUM SERPL-SCNC: 4.2 MMOL/L (ref 3.5–5.3)
PROT SERPL-MCNC: 7.9 G/DL (ref 6.4–8.2)
RBC # BLD AUTO: 4.52 MILLION/UL (ref 3.81–5.12)
RSV RNA RESP QL NAA+PROBE: NEGATIVE
SARS-COV-2 RNA RESP QL NAA+PROBE: POSITIVE
SODIUM SERPL-SCNC: 132 MMOL/L (ref 136–145)
WBC # BLD AUTO: 5.26 THOUSAND/UL (ref 4.31–10.16)

## 2021-12-22 PROCEDURE — 80053 COMPREHEN METABOLIC PANEL: CPT | Performed by: EMERGENCY MEDICINE

## 2021-12-22 PROCEDURE — 96375 TX/PRO/DX INJ NEW DRUG ADDON: CPT

## 2021-12-22 PROCEDURE — 96361 HYDRATE IV INFUSION ADD-ON: CPT

## 2021-12-22 PROCEDURE — 96374 THER/PROPH/DIAG INJ IV PUSH: CPT

## 2021-12-22 PROCEDURE — 93005 ELECTROCARDIOGRAM TRACING: CPT

## 2021-12-22 PROCEDURE — 84484 ASSAY OF TROPONIN QUANT: CPT | Performed by: EMERGENCY MEDICINE

## 2021-12-22 PROCEDURE — 0241U HB NFCT DS VIR RESP RNA 4 TRGT: CPT | Performed by: EMERGENCY MEDICINE

## 2021-12-22 PROCEDURE — 96376 TX/PRO/DX INJ SAME DRUG ADON: CPT

## 2021-12-22 PROCEDURE — 36415 COLL VENOUS BLD VENIPUNCTURE: CPT

## 2021-12-22 PROCEDURE — 71045 X-RAY EXAM CHEST 1 VIEW: CPT

## 2021-12-22 PROCEDURE — 99284 EMERGENCY DEPT VISIT MOD MDM: CPT

## 2021-12-22 PROCEDURE — 99285 EMERGENCY DEPT VISIT HI MDM: CPT | Performed by: EMERGENCY MEDICINE

## 2021-12-22 PROCEDURE — 85025 COMPLETE CBC W/AUTO DIFF WBC: CPT | Performed by: EMERGENCY MEDICINE

## 2021-12-22 RX ORDER — ASPIRIN 325 MG
325 TABLET ORAL DAILY
COMMUNITY

## 2021-12-22 RX ORDER — KETOROLAC TROMETHAMINE 30 MG/ML
10 INJECTION, SOLUTION INTRAMUSCULAR; INTRAVENOUS ONCE
Status: COMPLETED | OUTPATIENT
Start: 2021-12-22 | End: 2021-12-22

## 2021-12-22 RX ORDER — ACETAMINOPHEN 325 MG/1
650 TABLET ORAL ONCE
Status: COMPLETED | OUTPATIENT
Start: 2021-12-22 | End: 2021-12-22

## 2021-12-22 RX ORDER — IBUPROFEN 200 MG
TABLET ORAL EVERY 6 HOURS PRN
COMMUNITY

## 2021-12-22 RX ORDER — ONDANSETRON 2 MG/ML
4 INJECTION INTRAMUSCULAR; INTRAVENOUS ONCE
Status: COMPLETED | OUTPATIENT
Start: 2021-12-22 | End: 2021-12-22

## 2021-12-22 RX ORDER — ACETAMINOPHEN 325 MG/1
650 TABLET ORAL EVERY 6 HOURS PRN
COMMUNITY

## 2021-12-22 RX ADMIN — KETOROLAC TROMETHAMINE 9.9 MG: 30 INJECTION, SOLUTION INTRAMUSCULAR at 18:09

## 2021-12-22 RX ADMIN — ACETAMINOPHEN 650 MG: 325 TABLET, FILM COATED ORAL at 19:27

## 2021-12-22 RX ADMIN — ONDANSETRON 4 MG: 2 INJECTION INTRAMUSCULAR; INTRAVENOUS at 19:27

## 2021-12-22 RX ADMIN — ONDANSETRON 4 MG: 2 INJECTION INTRAMUSCULAR; INTRAVENOUS at 18:09

## 2021-12-22 RX ADMIN — SODIUM CHLORIDE 1000 ML: 0.9 INJECTION, SOLUTION INTRAVENOUS at 18:09

## 2021-12-23 ENCOUNTER — HOSPITAL ENCOUNTER (OUTPATIENT)
Dept: INFUSION CENTER | Facility: HOSPITAL | Age: 51
Discharge: HOME | End: 2021-12-23
Attending: INTERNAL MEDICINE
Payer: COMMERCIAL

## 2021-12-23 ENCOUNTER — DOCUMENTATION (OUTPATIENT)
Dept: FAMILY MEDICINE CLINIC | Facility: CLINIC | Age: 51
End: 2021-12-23

## 2021-12-23 VITALS
DIASTOLIC BLOOD PRESSURE: 82 MMHG | RESPIRATION RATE: 18 BRPM | TEMPERATURE: 101.8 F | OXYGEN SATURATION: 94 % | SYSTOLIC BLOOD PRESSURE: 145 MMHG | HEART RATE: 109 BPM

## 2021-12-23 DIAGNOSIS — J45.41 MODERATE PERSISTENT ASTHMA WITH ACUTE EXACERBATION: Primary | ICD-10-CM

## 2021-12-23 DIAGNOSIS — U07.1 LAB TEST POSITIVE FOR DETECTION OF COVID-19 VIRUS: ICD-10-CM

## 2021-12-23 LAB
ATRIAL RATE: 114 BPM
P AXIS: 59 DEGREES
PR INTERVAL: 140 MS
QRS AXIS: 0 DEGREES
QRSD INTERVAL: 88 MS
QT INTERVAL: 296 MS
QTC INTERVAL: 407 MS
T WAVE AXIS: 43 DEGREES
VENTRICULAR RATE: 114 BPM

## 2021-12-23 PROCEDURE — 93010 ELECTROCARDIOGRAM REPORT: CPT | Performed by: INTERNAL MEDICINE

## 2021-12-23 PROCEDURE — M0243 CASIRIVI AND IMDEVI INFUSION: HCPCS | Performed by: INTERNAL MEDICINE

## 2021-12-23 RX ORDER — ALBUTEROL SULFATE 90 UG/1
3 AEROSOL, METERED RESPIRATORY (INHALATION) ONCE AS NEEDED
Status: CANCELLED | OUTPATIENT
Start: 2021-12-23

## 2021-12-23 RX ORDER — SODIUM CHLORIDE 9 MG/ML
20 INJECTION, SOLUTION INTRAVENOUS ONCE AS NEEDED
Status: CANCELLED | OUTPATIENT
Start: 2021-12-23

## 2021-12-23 RX ORDER — ACETAMINOPHEN 325 MG/1
650 TABLET ORAL ONCE AS NEEDED
Status: DISCONTINUED | OUTPATIENT
Start: 2021-12-23 | End: 2021-12-26 | Stop reason: HOSPADM

## 2021-12-23 RX ORDER — ONDANSETRON 2 MG/ML
4 INJECTION INTRAMUSCULAR; INTRAVENOUS ONCE AS NEEDED
Status: DISCONTINUED | OUTPATIENT
Start: 2021-12-23 | End: 2021-12-26 | Stop reason: HOSPADM

## 2021-12-23 RX ORDER — SODIUM CHLORIDE 9 MG/ML
20 INJECTION, SOLUTION INTRAVENOUS ONCE AS NEEDED
Status: DISCONTINUED | OUTPATIENT
Start: 2021-12-23 | End: 2021-12-26 | Stop reason: HOSPADM

## 2021-12-23 RX ORDER — ALBUTEROL SULFATE 90 UG/1
3 AEROSOL, METERED RESPIRATORY (INHALATION) ONCE AS NEEDED
Status: DISCONTINUED | OUTPATIENT
Start: 2021-12-23 | End: 2021-12-26 | Stop reason: HOSPADM

## 2021-12-23 RX ORDER — ONDANSETRON 2 MG/ML
4 INJECTION INTRAMUSCULAR; INTRAVENOUS ONCE AS NEEDED
Status: CANCELLED | OUTPATIENT
Start: 2021-12-23

## 2021-12-23 RX ORDER — ACETAMINOPHEN 325 MG/1
650 TABLET ORAL ONCE AS NEEDED
Status: CANCELLED | OUTPATIENT
Start: 2021-12-23

## 2021-12-23 RX ADMIN — CASIRIVIMAB AND IMDEVIMAB 1200 MG COMBINED: 600; 600 INJECTION, SOLUTION, CONCENTRATE INTRAVENOUS at 14:55

## 2021-12-25 ENCOUNTER — NURSE TRIAGE (OUTPATIENT)
Dept: OTHER | Facility: OTHER | Age: 51
End: 2021-12-25

## 2021-12-27 ENCOUNTER — TELEMEDICINE (OUTPATIENT)
Dept: FAMILY MEDICINE CLINIC | Facility: CLINIC | Age: 51
End: 2021-12-27
Payer: COMMERCIAL

## 2021-12-27 DIAGNOSIS — R05.9 COUGH: ICD-10-CM

## 2021-12-27 DIAGNOSIS — U07.1 COVID-19: Primary | ICD-10-CM

## 2021-12-27 PROCEDURE — 99214 OFFICE O/P EST MOD 30 MIN: CPT | Performed by: INTERNAL MEDICINE

## 2021-12-27 PROCEDURE — 1036F TOBACCO NON-USER: CPT | Performed by: INTERNAL MEDICINE

## 2022-01-03 ENCOUNTER — HOSPITAL ENCOUNTER (OUTPATIENT)
Dept: RADIOLOGY | Facility: HOSPITAL | Age: 52
Discharge: HOME | End: 2022-01-03
Attending: INTERNAL MEDICINE
Payer: COMMERCIAL

## 2022-01-03 DIAGNOSIS — U07.1 COVID-19: ICD-10-CM

## 2022-01-03 PROCEDURE — 71046 X-RAY EXAM CHEST 2 VIEWS: CPT

## 2022-01-13 DIAGNOSIS — R05.9 COUGH: Primary | ICD-10-CM

## 2022-01-13 RX ORDER — PROMETHAZINE HYDROCHLORIDE AND CODEINE PHOSPHATE 6.25; 1 MG/5ML; MG/5ML
SYRUP ORAL
Qty: 180 ML | Refills: 0 | Status: SHIPPED | OUTPATIENT
Start: 2022-01-13

## 2022-01-13 NOTE — TELEPHONE ENCOUNTER
Diagnosed covid positive on 12/17, went to er 12/19, chest xray confirmed pneumonia    Feels like she is not getting better, sinus dripping, congestion, still coughing up muscus      She is back at work, wants to know if you can call something for the coughing    Mohan 1019    Patient ph # [phone]

## 2022-01-13 NOTE — TELEPHONE ENCOUNTER
Again, cough can last awhile post covid-she should keep using her albuterol consistently and we can order promethazine with codeine as long as she's not allergic-she'll need to repeat the CXR-not sure if some oral steroids would help too but it's a consideration-the promethazine with codeine can be 1-2 teaspoons tid prn

## 2022-03-28 DIAGNOSIS — E03.9 ACQUIRED HYPOTHYROIDISM: ICD-10-CM

## 2022-03-28 RX ORDER — LEVOTHYROXINE SODIUM 0.05 MG/1
TABLET ORAL
Qty: 90 TABLET | Refills: 3 | Status: SHIPPED | OUTPATIENT
Start: 2022-03-28

## 2022-06-09 ENCOUNTER — VBI (OUTPATIENT)
Dept: ADMINISTRATIVE | Facility: OTHER | Age: 52
End: 2022-06-09

## 2022-06-10 ENCOUNTER — TELEPHONE (OUTPATIENT)
Dept: FAMILY MEDICINE CLINIC | Facility: CLINIC | Age: 52
End: 2022-06-10

## 2022-06-10 NOTE — TELEPHONE ENCOUNTER
Can she get a refill for Zolpidem 10mg, 1x a day at bedtime    Pharmacy - [facility]    Patient ph # [phone]    Patient said it has been awhile since she used this medication

## 2022-06-12 DIAGNOSIS — G47.00 INSOMNIA, UNSPECIFIED TYPE: Primary | ICD-10-CM

## 2022-06-12 RX ORDER — ZOLPIDEM TARTRATE 10 MG/1
10 TABLET ORAL
Qty: 30 TABLET | Refills: 0 | Status: SHIPPED | OUTPATIENT
Start: 2022-06-12 | End: 2023-05-08

## 2022-08-29 ENCOUNTER — OFFICE VISIT (OUTPATIENT)
Dept: PODIATRY | Facility: CLINIC | Age: 52
End: 2022-08-29
Payer: COMMERCIAL

## 2022-08-29 VITALS
WEIGHT: 198 LBS | HEIGHT: 66 IN | HEART RATE: 82 BPM | OXYGEN SATURATION: 98 % | DIASTOLIC BLOOD PRESSURE: 72 MMHG | BODY MASS INDEX: 31.82 KG/M2 | SYSTOLIC BLOOD PRESSURE: 128 MMHG

## 2022-08-29 DIAGNOSIS — M77.41 METATARSALGIA OF RIGHT FOOT: ICD-10-CM

## 2022-08-29 DIAGNOSIS — M76.71 PERONEAL TENDINITIS, RIGHT: Primary | ICD-10-CM

## 2022-08-29 PROCEDURE — 99203 OFFICE O/P NEW LOW 30 MIN: CPT | Performed by: PODIATRIST

## 2022-08-29 NOTE — PROGRESS NOTES
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  She will follow-up with me on as-needed basis, as she has minimal symptoms at her visit today  Diagnoses and all orders for this visit:    Peroneal tendinitis, right    Metatarsalgia of right foot          Subjective:      Patient ID: Cecy Clark is a 45 y o  female  The patient presents today with a chief complaint of right ankle pain present for about a month  Since her appointment was made, her ankle pain has steadily improved  She has minimal discomfort in her ankle today  She does note that she goes on walks for exercise, and usually wears a pair of Vionic shoes she does this  She denies any history of trauma or injury to the right ankle  She does note a prior history of plantar fasciitis to her left heel, though this is be doing pretty well today        The following portions of the patient's history were reviewed and updated as appropriate: allergies, current medications, past family history, past medical history, past social history, past surgical history and problem list       PAST MEDICAL HISTORY:  Past Medical History:   Diagnosis Date    Anemia     Asthma     Hyperlipemia     triglycerides    Hypertriglyceridemia     Hypothyroidism     Impaired fasting glucose     Insomnia     Lab test positive for detection of COVID-19 virus 12/22/2021    Premenstrual tension syndrome        PAST SURGICAL HISTORY:  Past Surgical History: Procedure Laterality Date    COLONOSCOPY  2016    MAMMO (HISTORICAL) Bilateral 11/03/2020        ALLERGIES:  Niacin    MEDICATIONS:  Current Outpatient Medications   Medication Sig Dispense Refill    levothyroxine 50 mcg tablet TAKE 1 TABLET BY MOUTH EVERY DAY 90 tablet 3    Omega-3 Fatty Acids (fish oil) 1,000 mg omega-3 acid ethyl esters 1 gram capsule      acetaminophen (TYLENOL) 325 mg tablet Take 650 mg by mouth every 6 (six) hours as needed for mild pain (Patient not taking: Reported on 8/29/2022)      aspirin 325 mg tablet Take 325 mg by mouth daily (Patient not taking: Reported on 8/29/2022)      choline fenofibrate (TRILIPIX) 135 MG capsule fenofibric acid (choline) 135 mg capsule,delayed release (Patient not taking: Reported on 8/29/2022)      diphenhydrAMINE (BENADRYL) 25 mg capsule diphenhydramine 25 mg capsule   TAKE 1 CAPSULE BY MOUTH 3 TIMES A DAY FOR 3 DAYS (Patient not taking: Reported on 8/29/2022)      ibuprofen (MOTRIN) 200 mg tablet Take by mouth every 6 (six) hours as needed for mild pain (Patient not taking: Reported on 8/29/2022)      omeprazole (PriLOSEC) 20 mg delayed release capsule omeprazole 20 mg capsule,delayed release (Patient not taking: Reported on 8/29/2022)      promethazine-codeine (PHENERGAN WITH CODEINE) 6 25-10 mg/5 mL syrup 1-2 teaspoons TID PRN (Patient not taking: Reported on 8/29/2022) 180 mL 0    zolpidem (AMBIEN) 10 mg tablet Take 1 tablet (10 mg total) by mouth daily at bedtime as needed for sleep (Patient not taking: Reported on 8/29/2022) 30 tablet 0     No current facility-administered medications for this visit         SOCIAL HISTORY:  Social History     Socioeconomic History    Marital status: /Civil Union     Spouse name: None    Number of children: None    Years of education: 4 year college    Highest education level: None   Occupational History    Occupation:    Tobacco Use    Smoking status: Former Smoker     Types: Cigarettes    Smokeless tobacco: Never Used    Tobacco comment: teenage    Vaping Use    Vaping Use: Never used   Substance and Sexual Activity    Alcohol use: Yes     Comment: social     Drug use: Never    Sexual activity: Yes     Partners: Male     Birth control/protection: Male Sterilization     Comment: Partner vasectomy   Other Topics Concern    None   Social History Narrative    · Most recent tobacco use screenin2018      · Do you currently or have you served in the [employer]:   No      · Were you activated, into active duty, as a member of the [employer] or as a Reservist:   No      · General stress level:   High      · Exercise level: Moderate      · Diet:   Regular      · Marital status:     since      · Sexual orientation:   Heterosexual      · Alcohol intake:   Occasional      · Live alone or with others:   with others      · Caffeine intake:   None      · Do you feel safe at home:   Yes      Social Determinants of Health     Financial Resource Strain: Not on file   Food Insecurity: Not on file   Transportation Needs: Not on file   Physical Activity: Not on file   Stress: Not on file   Social Connections: Not on file   Intimate Partner Violence: Not on file   Housing Stability: Not on file        Review of Systems   Constitutional: Negative for chills and fever  HENT: Negative for ear pain and sore throat  Eyes: Negative for pain and visual disturbance  Respiratory: Negative for cough and shortness of breath  Cardiovascular: Negative for chest pain and palpitations  Gastrointestinal: Negative for abdominal pain and vomiting  Genitourinary: Negative for dysuria and hematuria  Musculoskeletal: Negative for arthralgias and back pain  Skin: Negative for color change and rash  Neurological: Negative for seizures and syncope  Psychiatric/Behavioral: Negative  All other systems reviewed and are negative          Objective:      /72   Pulse 82   Ht 5' 6" (1 676 m)   Wt 89 8 kg (198 lb)   SpO2 98%   BMI 31 96 kg/m²          Physical Exam  Constitutional:       Appearance: Normal appearance  HENT:      Head: Normocephalic and atraumatic  Nose: Nose normal    Cardiovascular:      Pulses:           Dorsalis pedis pulses are 2+ on the right side and 2+ on the left side  Posterior tibial pulses are 2+ on the right side and 2+ on the left side  Pulmonary:      Effort: Pulmonary effort is normal    Musculoskeletal:        Feet:    Feet:      Right foot:      Skin integrity: Skin integrity normal       Left foot:      Skin integrity: Skin integrity normal       Comments: There is mild tenderness to palpation to the peroneal tendons of the lateral right ankle; muscle power is 5/5; she has a high arch foot type; there is no tenderness to palpation of the plantar fascia; she does note occasional paresthesias to the right forefoot, there is no active tenderness to palpation of the intermetatarsal spaces nor the metatarsophalangeal joints of the right foot  Skin:     General: Skin is warm  Capillary Refill: Capillary refill takes less than 2 seconds  Neurological:      General: No focal deficit present  Mental Status: She is alert and oriented to person, place, and time  Psychiatric:         Mood and Affect: Mood normal          Behavior: Behavior normal          Thought Content:  Thought content normal

## 2022-09-15 ENCOUNTER — VBI (OUTPATIENT)
Dept: ADMINISTRATIVE | Facility: OTHER | Age: 52
End: 2022-09-15

## 2022-10-12 PROBLEM — Z12.4 SCREENING FOR CERVICAL CANCER: Status: RESOLVED | Noted: 2021-04-14 | Resolved: 2022-10-12

## 2022-11-01 ENCOUNTER — ANNUAL EXAM (OUTPATIENT)
Dept: OBGYN CLINIC | Facility: CLINIC | Age: 52
End: 2022-11-01

## 2022-11-01 VITALS
DIASTOLIC BLOOD PRESSURE: 84 MMHG | HEIGHT: 66 IN | BODY MASS INDEX: 31.27 KG/M2 | SYSTOLIC BLOOD PRESSURE: 118 MMHG | WEIGHT: 194.6 LBS

## 2022-11-01 DIAGNOSIS — L90.0 LICHEN SCLEROSUS: ICD-10-CM

## 2022-11-01 DIAGNOSIS — Z01.411 ENCOUNTER FOR GYNECOLOGICAL EXAMINATION (GENERAL) (ROUTINE) WITH ABNORMAL FINDINGS: Primary | ICD-10-CM

## 2022-11-01 DIAGNOSIS — Z12.31 SCREENING MAMMOGRAM FOR BREAST CANCER: ICD-10-CM

## 2022-11-01 DIAGNOSIS — Z12.11 SCREEN FOR COLON CANCER: ICD-10-CM

## 2022-11-01 DIAGNOSIS — Z80.0 FAMILY HISTORY OF COLON CANCER IN MOTHER: ICD-10-CM

## 2022-11-01 DIAGNOSIS — Z12.4 ENCOUNTER FOR PAPANICOLAOU SMEAR FOR CERVICAL CANCER SCREENING: ICD-10-CM

## 2022-11-01 PROBLEM — M21.40 FLAT FOOT: Status: ACTIVE | Noted: 2021-05-12

## 2022-11-01 PROBLEM — M25.572 CHRONIC PAIN OF BOTH ANKLES: Status: ACTIVE | Noted: 2021-05-12

## 2022-11-01 PROBLEM — M17.11 PATELLOFEMORAL ARTHRITIS OF RIGHT KNEE: Status: ACTIVE | Noted: 2022-09-26

## 2022-11-01 PROBLEM — G89.29 CHRONIC PAIN OF BOTH ANKLES: Status: ACTIVE | Noted: 2021-05-12

## 2022-11-01 PROBLEM — M25.571 CHRONIC PAIN OF BOTH ANKLES: Status: ACTIVE | Noted: 2021-05-12

## 2022-11-01 NOTE — PROGRESS NOTES
ASSESSMENT & PLAN: Edel Clark is a 47 y o   with abnormal gynecologic exam     1   Routine well woman exam done today  2  Pap and HPV:  The patient's last pap and hpv was 10/2020  It was normal     Pap and cotesting was done today  Current ASCCP Guidelines reviewed  3   Mammogram ordered  4  Colon Ca record not in chart - done at [facility], now [facility] of [facility] referral provided, encouraged pt to call to schedule since she reports being due this year and mother w/ hx of colon CA  5  The following were reviewed in today's visit: breast self exam, mammography screening ordered, menopause, adequate intake of calcium and vitamin D, exercise, healthy diet and colonoscopy discussed and ordered  6  Pt has thinner, white tissue vulvar areas as noted below  States has had for years, some infrequent itching but stable, usually around her periods  She is not aware of dx of lichen sclerosus, although lichen sclerosus noted in OV note in  from dr Jacques Charles  Again, no distinct lesions, appearance as lichen sclerosus, no worse sxs  For now, pt to monitor sxs, topical hydrocortisone prn  If worsens may need vulvar biopsy, as I do not see this has been done  Pt agreeable to monitor  RTO in 1 year for annual, sooner if any problems arise  CC:  Annual Gynecologic Examination    HPI: Edel Clark is a 47 y o   who presents for annual gynecologic examination  She has the following concerns:  None  Last seen  for annual, 2020 had EMBx for heavy bleeding, normal result  No issues since  Hypothyroidism - managed by PCP and on levothyroxine  Healthy diet Yes  Vitamins Yes - MVI  Exercise Yes - walking, in PT for knee  Patient's last menstrual period was 2022  Menses frequency: becoming less frequent  Last normal period was July and since then spotting in September, last time she bled     Length of bleeding: 3 days  Bleeding quality: much lighter than in past      Bowel or bladder changes: none      Health Maintenance:      She does perform irregular monthly self breast exams  Denies breast lumps, nipple discharge, skin changes or breast pain  She feels safe at home  Feels safe with partner  Last Pap: 10/2020  Last mammogram:   Last colonoscopy: has had 3 already due to mom w/ colon Ca; [facility]  Past Medical History:   Diagnosis Date   • Abnormal Pap smear of cervix    • Anemia    • Arthritis    • Asthma    • Hyperlipemia     triglycerides   • Hypertriglyceridemia    • Hypothyroidism    • Impaired fasting glucose    • Insomnia    • Lab test positive for detection of COVID-19 virus 2021   • Premenstrual tension syndrome    • Tendonitis        Past Surgical History:   Procedure Laterality Date   • COLONOSCOPY     • MAMMO (HISTORICAL) Bilateral 2020   • WISDOM TOOTH EXTRACTION         Past OB/Gyn History:  OB History        1    Para   1    Term   1       0    AB   0    Living   1       SAB   0    IAB   0    Ectopic   0    Multiple        Live Births   1               Pt does not have menstrual issues  History of abnormal pap smears: pt denies  Patient is currently sexually active  Monogamous with   The current method of family planning is vasectomy  Family History   Problem Relation Age of Onset   • Colon cancer Mother 67   • Diabetes Mother    • Hypertension Mother    • Heart disease Father    • Hyperlipidemia Father    • Heart attack Father    • Heart failure Father    • Alzheimer's disease Maternal Aunt    • No Known Problems Son    • No Known Problems Sister    • Breast cancer Maternal Grandmother         52's ?    • No Known Problems Maternal Grandfather    • Brain cancer Paternal Grandmother         young age   • No Known Problems Paternal Grandfather    • No Known Problems Paternal Uncle        Family history of Breast/Uterine/Ovarian/Colon Cancer: as in HPI    Social History:  Social History Socioeconomic History   • Marital status: /Civil Union     Spouse name: Not on file   • Number of children: Not on file   • Years of education: 4 year college   • Highest education level: Not on file   Occupational History   • Occupation:    Tobacco Use   • Smoking status: Former Smoker     Types: Cigarettes   • Smokeless tobacco: Never Used   • Tobacco comment: teenage    Vaping Use   • Vaping Use: Never used   Substance and Sexual Activity   • Alcohol use: Yes     Comment: social    • Drug use: Never   • Sexual activity: Yes     Partners: Male     Birth control/protection: Male Sterilization     Comment: Partner vasectomy   Other Topics Concern   • Not on file   Social History Narrative    · Most recent tobacco use screenin2018      · Do you currently or have you served in [employer]:   No      · Were you activated, into active duty, as a member of the [employer] or as a Reservist:   No      · General stress level:   High      · Exercise level:    Moderate      · Diet:   Regular      · Marital status:     since      · Sexual orientation:   Heterosexual      · Alcohol intake:   Occasional      · Live alone or with others:   with others      · Caffeine intake:   None      · Do you feel safe at home:   Yes      Social Determinants of Health     Financial Resource Strain: Not on file   Food Insecurity: Not on file   Transportation Needs: Not on file   Physical Activity: Not on file   Stress: Not on file   Social Connections: Not on file   Intimate Partner Violence: Not on file   Housing Stability: Not on file       Allergies   Allergen Reactions   • Niacin Hives     In high doses   (Flushing)       Current Outpatient Medications:   •  levothyroxine 50 mcg tablet, TAKE 1 TABLET BY MOUTH EVERY DAY, Disp: 90 tablet, Rfl: 3  •  Multiple Vitamin (MULTI-VITAMIN DAILY PO), Take by mouth, Disp: , Rfl:   •  acetaminophen (TYLENOL) 325 mg tablet, Take 650 mg by mouth every 6 (six) hours as needed for mild pain (Patient not taking: No sig reported), Disp: , Rfl:   •  aspirin 325 mg tablet, Take 325 mg by mouth daily (Patient not taking: No sig reported), Disp: , Rfl:   •  choline fenofibrate (TRILIPIX) 135 MG capsule, fenofibric acid (choline) 135 mg capsule,delayed release (Patient not taking: No sig reported), Disp: , Rfl:   •  ibuprofen (MOTRIN) 200 mg tablet, Take by mouth every 6 (six) hours as needed for mild pain (Patient not taking: No sig reported), Disp: , Rfl:   •  Omega-3 Fatty Acids (fish oil) 1,000 mg, omega-3 acid ethyl esters 1 gram capsule (Patient not taking: Reported on 11/1/2022), Disp: , Rfl:   •  omeprazole (PriLOSEC) 20 mg delayed release capsule, omeprazole 20 mg capsule,delayed release (Patient not taking: No sig reported), Disp: , Rfl:   •  zolpidem (AMBIEN) 10 mg tablet, Take 1 tablet (10 mg total) by mouth daily at bedtime as needed for sleep (Patient not taking: No sig reported), Disp: 30 tablet, Rfl: 0      Review of Systems  Constitutional :no fever, feels well, no tiredness, no recent weight gain or loss  ENT: no ear ache, no loss of hearing, no nosebleeds or nasal discharge, no sore throat or hoarseness  Cardiovascular: no complaints of slow or fast heart beat, no chest pain, no palpitations, no leg claudication or lower extremity edema  Respiratory: no complaints of shortness of shortness of breath, no KING  Breasts:no complaints of breast pain, breast lump, or nipple discharge  Gastrointestinal: no complaints of abdominal pain, constipation, nausea, vomiting, or diarrhea or bloody stools  Genitourinary : occasional vulvar itching, primarily w/ period x years near clitoris and mesa  no complaints of dysuria, incontinence, pelvic pain, no dysmenorrhea, vaginal discharge/itch/odor or abnormal vaginal bleeding  Musculoskeletal: no complaints of arthralgia, no myalgia, no joint swelling or stiffness, no limb pain or swelling    Integumentary: no complaints of skin rash or lesion, itching or dry skin  Neurological: no complaints of headache, no confusion, no numbness or tingling, no dizziness or fainting  MH: denies depression or anxiety    Objective      /84 (BP Location: Left arm, Patient Position: Sitting, Cuff Size: Large)   Ht 5' 6" (1 676 m)   Wt 88 3 kg (194 lb 9 6 oz)   LMP 09/12/2022   BMI 31 41 kg/m²     General:   appears stated age, cooperative, alert normal mood and affect  BMI 31 41   Neck: normal, supple,trachea midline, no masses  Thyroid palpated normal     Heart: regular rate and rhythm, S1, S2 normal, no murmur, click, rub or gallop   Lungs: clear to auscultation bilaterally   Breasts: normal appearance, no masses or tenderness, No nipple retraction or dimpling, No nipple discharge or bleeding, No axillary or supraclavicular adenopathy, Normal to palpation without dominant masses   Abdomen: soft, non-tender, without masses or organomegaly   Vulva: Lighter thin vulvar tissue noted clitoral mesa and localized surrounding vulvar area, as well as similar presentation perineal area  No distinct solitary lesions, ulcers, open areas or redness/swelling  Vagina: normal vagina, no discharge, exudate, lesion, or erythema   Urethra: normal   Cervix: Normal, no discharge  PAP done  Nontender  Uterus: normal   Adnexa: no mass, fullness, tenderness   Lymphatic palpation of lymph nodes in neck, axilla, groin and/or other locations: no lymphadenopathy or masses noted   Skin normal skin turgor and no rashes     Psychiatric orientation to person, place, and time: normal  mood and affect: normal

## 2022-11-03 ENCOUNTER — TELEPHONE (OUTPATIENT)
Dept: FAMILY MEDICINE CLINIC | Facility: CLINIC | Age: 52
End: 2022-11-03

## 2022-11-03 DIAGNOSIS — R05.9 COUGH, UNSPECIFIED TYPE: Primary | ICD-10-CM

## 2022-11-03 RX ORDER — AZITHROMYCIN 250 MG/1
TABLET, FILM COATED ORAL
Qty: 6 TABLET | Refills: 0 | Status: SHIPPED | OUTPATIENT
Start: 2022-11-03 | End: 2022-11-07

## 2022-11-03 NOTE — TELEPHONE ENCOUNTER
Patient called says she's has a sinus cold, dry cough, stuffy runny nose - no fever - negative Covid      Wants to know if you can send her something into the pharmacy - like a Z-kelli    [facility] [address] # [phone]

## 2022-11-10 LAB
LAB AP GYN PRIMARY INTERPRETATION: NORMAL
Lab: NORMAL

## 2022-11-29 ENCOUNTER — OFFICE VISIT (OUTPATIENT)
Dept: FAMILY MEDICINE CLINIC | Facility: CLINIC | Age: 52
End: 2022-11-29

## 2022-11-29 VITALS
RESPIRATION RATE: 16 BRPM | SYSTOLIC BLOOD PRESSURE: 150 MMHG | HEIGHT: 66 IN | TEMPERATURE: 97.2 F | DIASTOLIC BLOOD PRESSURE: 98 MMHG | BODY MASS INDEX: 31.66 KG/M2 | WEIGHT: 197 LBS | OXYGEN SATURATION: 99 % | HEART RATE: 78 BPM

## 2022-11-29 DIAGNOSIS — R05.8 COUGH PRESENT FOR GREATER THAN 3 WEEKS: ICD-10-CM

## 2022-11-29 DIAGNOSIS — J01.10 ACUTE FRONTAL SINUSITIS, RECURRENCE NOT SPECIFIED: Primary | ICD-10-CM

## 2022-11-29 DIAGNOSIS — R03.0 ELEVATED BLOOD PRESSURE READING IN OFFICE WITHOUT DIAGNOSIS OF HYPERTENSION: ICD-10-CM

## 2022-11-29 RX ORDER — ALBUTEROL SULFATE 90 UG/1
2 AEROSOL, METERED RESPIRATORY (INHALATION) EVERY 6 HOURS PRN
Qty: 6.7 G | Refills: 2 | Status: SHIPPED | OUTPATIENT
Start: 2022-11-29

## 2022-11-29 RX ORDER — AMOXICILLIN AND CLAVULANATE POTASSIUM 875; 125 MG/1; MG/1
1 TABLET, FILM COATED ORAL EVERY 12 HOURS SCHEDULED
Qty: 20 TABLET | Refills: 0 | Status: SHIPPED | OUTPATIENT
Start: 2022-11-29 | End: 2022-12-09

## 2022-11-29 RX ORDER — FLUTICASONE PROPIONATE 50 MCG
1 SPRAY, SUSPENSION (ML) NASAL DAILY
Qty: 9.9 ML | Refills: 1 | Status: SHIPPED | OUTPATIENT
Start: 2022-11-29

## 2022-11-29 NOTE — PROGRESS NOTES
Name: Sander Momin      : 1970      MRN: [id number]  Encounter Provider: JOSE Deutsch  Encounter Date: 2022   Encounter department: [facility]    Assessment & Plan     1  Acute frontal sinusitis, recurrence not specified  Assessment & Plan:  Start Augmentin as prescribed  Recommend increased fluid intake tylenol/ibuprofen as need for pain and nasal steroid spray to help with congestion  Orders:  -     amoxicillin-clavulanate (Augmentin) 875-125 mg per tablet; Take 1 tablet by mouth every 12 (twelve) hours for 10 days  -     fluticasone (FLONASE) 50 mcg/act nasal spray; 1 spray into each nostril daily    2  Cough present for greater than 3 weeks  Assessment & Plan:  Albuterol inhaler as prescribed no fever or chest pain was previously on z-kelli, started on Augmentin secondary to sinusitis  F/u for worsening sxs  Orders:  -     albuterol (Proventil HFA) 90 mcg/act inhaler; Inhale 2 puffs every 6 (six) hours as needed for wheezing or shortness of breath    3  Elevated blood pressure reading in office without diagnosis of hypertension  Assessment & Plan:  Recheck 150/98 she states BP is normally in the 120's  She has been taking advil cold and sinus for the past several weeks that has pseudoephedrine  Recommend monitoring at home avoiding decongestants, low sodium diet and f/u if readings remain elevated  Subjective      Has had a persistent cough for the last 4-5 weeks  She completed z-kelli around 3 weeks ago however she has sinus pressure and pain, yellowish-green nasal discharge and post nasal drip  She denies fever, chest pain or SOB  Review of Systems   Constitutional: Negative for activity change, chills, fatigue and fever  HENT: Positive for congestion, postnasal drip, sinus pressure and sinus pain  Negative for ear discharge, ear pain, facial swelling and sore throat  Respiratory: Positive for cough and chest tightness  Negative for shortness of breath and wheezing  Cardiovascular: Negative for chest pain and palpitations  Skin: Negative for color change  Neurological: Positive for headaches  Negative for dizziness, tremors, weakness, light-headedness and numbness  Current Outpatient Medications on File Prior to Visit   Medication Sig   • levothyroxine 50 mcg tablet TAKE 1 TABLET BY MOUTH EVERY DAY   • Multiple Vitamin (MULTI-VITAMIN DAILY PO) Take by mouth   • acetaminophen (TYLENOL) 325 mg tablet Take 650 mg by mouth every 6 (six) hours as needed for mild pain (Patient not taking: Reported on 8/29/2022)   • aspirin 325 mg tablet Take 325 mg by mouth daily (Patient not taking: Reported on 8/29/2022)   • choline fenofibrate (TRILIPIX) 135 MG capsule fenofibric acid (choline) 135 mg capsule,delayed release (Patient not taking: Reported on 8/29/2022)   • ibuprofen (MOTRIN) 200 mg tablet Take by mouth every 6 (six) hours as needed for mild pain (Patient not taking: Reported on 8/29/2022)   • Omega-3 Fatty Acids (fish oil) 1,000 mg omega-3 acid ethyl esters 1 gram capsule (Patient not taking: Reported on 11/1/2022)   • omeprazole (PriLOSEC) 20 mg delayed release capsule omeprazole 20 mg capsule,delayed release (Patient not taking: Reported on 8/29/2022)   • zolpidem (AMBIEN) 10 mg tablet Take 1 tablet (10 mg total) by mouth daily at bedtime as needed for sleep (Patient not taking: Reported on 8/29/2022)       Objective     /98 (BP Location: Right arm, Patient Position: Sitting)   Pulse 78   Temp (!) 97 2 °F (36 2 °C) (Temporal)   Resp 16   Ht 5' 6" (1 676 m)   Wt 89 4 kg (197 lb)   SpO2 99%   BMI 31 80 kg/m²     Physical Exam  Vitals and nursing note reviewed  Constitutional:       General: She is not in acute distress  Appearance: Normal appearance  She is obese  She is not ill-appearing, toxic-appearing or diaphoretic  HENT:      Head: Normocephalic and atraumatic        Right Ear: Tympanic membrane, ear canal and external ear normal  There is no impacted cerumen  Left Ear: Tympanic membrane, ear canal and external ear normal  There is no impacted cerumen  Nose: Congestion present  No rhinorrhea  Right Turbinates: Swollen  Left Turbinates: Swollen  Right Sinus: Maxillary sinus tenderness and frontal sinus tenderness present  Left Sinus: Maxillary sinus tenderness and frontal sinus tenderness present  Mouth/Throat:      Mouth: Mucous membranes are moist       Pharynx: Posterior oropharyngeal erythema present  No oropharyngeal exudate  Eyes:      General: No scleral icterus  Right eye: No discharge  Left eye: No discharge  Conjunctiva/sclera: Conjunctivae normal       Pupils: Pupils are equal, round, and reactive to light  Cardiovascular:      Rate and Rhythm: Normal rate and regular rhythm  Pulses: Normal pulses  Heart sounds: Normal heart sounds  Pulmonary:      Effort: Pulmonary effort is normal  No respiratory distress  Breath sounds: Normal breath sounds  Abdominal:      General: Bowel sounds are normal       Tenderness: There is no abdominal tenderness  There is no right CVA tenderness or left CVA tenderness  Musculoskeletal:         General: Normal range of motion  Cervical back: Normal range of motion and neck supple  No tenderness  Right lower leg: No edema  Left lower leg: No edema  Lymphadenopathy:      Cervical: No cervical adenopathy  Skin:     General: Skin is warm  Capillary Refill: Capillary refill takes less than 2 seconds  Findings: No erythema or rash  Neurological:      General: No focal deficit present  Mental Status: She is alert and oriented to person, place, and time  Psychiatric:         Mood and Affect: Mood normal          Behavior: Behavior normal          Thought Content:  Thought content normal          Judgment: Judgment normal        [id number] [address] [facility]

## 2022-11-29 NOTE — ASSESSMENT & PLAN NOTE
Recheck 150/98 she states BP is normally in the 120's  She has been taking advil cold and sinus for the past several weeks that has pseudoephedrine  Recommend monitoring at home avoiding decongestants, low sodium diet and f/u if readings remain elevated

## 2022-11-29 NOTE — ASSESSMENT & PLAN NOTE
Start Augmentin as prescribed  Recommend increased fluid intake tylenol/ibuprofen as need for pain and nasal steroid spray to help with congestion

## 2022-11-29 NOTE — ASSESSMENT & PLAN NOTE
Albuterol inhaler as prescribed no fever or chest pain was previously on z-kelli, started on Augmentin secondary to sinusitis  F/u for worsening sxs

## 2023-01-01 ENCOUNTER — TELEPHONE (OUTPATIENT)
Dept: NEUROSURGERY | Facility: CLINIC | Age: 53
End: 2023-01-01

## 2023-01-09 ENCOUNTER — OFFICE VISIT (OUTPATIENT)
Dept: FAMILY MEDICINE CLINIC | Facility: CLINIC | Age: 53
End: 2023-01-09

## 2023-01-09 VITALS
DIASTOLIC BLOOD PRESSURE: 75 MMHG | SYSTOLIC BLOOD PRESSURE: 130 MMHG | HEIGHT: 66 IN | HEART RATE: 78 BPM | BODY MASS INDEX: 30.53 KG/M2 | WEIGHT: 190 LBS | OXYGEN SATURATION: 98 % | RESPIRATION RATE: 12 BRPM | TEMPERATURE: 97.3 F

## 2023-01-09 DIAGNOSIS — R03.0 ELEVATED BLOOD PRESSURE READING IN OFFICE WITHOUT DIAGNOSIS OF HYPERTENSION: ICD-10-CM

## 2023-01-09 DIAGNOSIS — E03.9 ACQUIRED HYPOTHYROIDISM: Primary | ICD-10-CM

## 2023-01-09 NOTE — PROGRESS NOTES
Name: Nayeli Frederick      : 1970      MRN: [id number]  Encounter Provider: [facility] Resident  Encounter Date: 2023   Encounter department: [place] [address]  Acquired hypothyroidism  Assessment & Plan:  Patient was at [facility] and was told to see a doctor to have thyroid check  Patient feels okay  Patient is taking levothyroxine 50 mcg early in the morning  Patient has not had TSH checked in for a while  Patient has mild constipation  Patient denies cold intolerance, bradycardia  No thyroid nodules or tendernss  Will check TSH, free T4, CBC, CMP and lipid panel  Follow up in 4 weeks     Orders:  -     TSH, 3rd generation; Future; Expected date: 2023  -     T4, free; Future; Expected date: 2023  -     CBC and differential; Future; Expected date: 2023  -     Comprehensive metabolic panel; Future; Expected date: 2023  -     Lipid panel; Future; Expected date: 2023    2  Elevated blood pressure reading in office without diagnosis of hypertension  Assessment & Plan:  Currently BP is acceptable  Advised patient to continue monitor           Subjective      Patient is a 47-year old female with a PMH of hypothyroidism and hyperlipidemia who presented from a [facility] where she was told to see a doctor regarding her thyroid problem  Patient feels okay  Patient denies fever, chills, chest pain, or abd pain  Mild constipation, no dysuria  Review of Systems   Constitutional: Negative for chills and fever  HENT: Negative for congestion, rhinorrhea, sneezing and sore throat  Eyes: Negative for pain and discharge  Respiratory: Negative for cough, chest tightness, shortness of breath and wheezing  Cardiovascular: Negative for chest pain and leg swelling  Gastrointestinal: Positive for constipation  Negative for abdominal pain, nausea and vomiting     Endocrine: Negative for polydipsia, polyphagia and polyuria  Genitourinary: Negative for flank pain, frequency and urgency  Musculoskeletal: Negative for arthralgias, back pain and joint swelling  Skin: Negative for color change and pallor  Neurological: Negative for dizziness, weakness, light-headedness and headaches  Psychiatric/Behavioral: Negative for agitation and confusion         Current Outpatient Medications on File Prior to Visit   Medication Sig   • fluticasone (FLONASE) 50 mcg/act nasal spray 1 spray into each nostril daily   • levothyroxine 50 mcg tablet TAKE 1 TABLET BY MOUTH EVERY DAY   • acetaminophen (TYLENOL) 325 mg tablet Take 650 mg by mouth every 6 (six) hours as needed for mild pain (Patient not taking: Reported on 8/29/2022)   • albuterol (Proventil HFA) 90 mcg/act inhaler Inhale 2 puffs every 6 (six) hours as needed for wheezing or shortness of breath (Patient not taking: Reported on 1/9/2023)   • aspirin 325 mg tablet Take 325 mg by mouth daily (Patient not taking: Reported on 8/29/2022)   • choline fenofibrate (TRILIPIX) 135 MG capsule fenofibric acid (choline) 135 mg capsule,delayed release (Patient not taking: Reported on 8/29/2022)   • ibuprofen (MOTRIN) 200 mg tablet Take by mouth every 6 (six) hours as needed for mild pain (Patient not taking: Reported on 8/29/2022)   • Multiple Vitamin (MULTI-VITAMIN DAILY PO) Take by mouth   • Omega-3 Fatty Acids (fish oil) 1,000 mg omega-3 acid ethyl esters 1 gram capsule (Patient not taking: Reported on 11/1/2022)   • omeprazole (PriLOSEC) 20 mg delayed release capsule omeprazole 20 mg capsule,delayed release (Patient not taking: Reported on 8/29/2022)   • zolpidem (AMBIEN) 10 mg tablet Take 1 tablet (10 mg total) by mouth daily at bedtime as needed for sleep (Patient not taking: Reported on 8/29/2022)       Objective     /75   Pulse 78   Temp (!) 97 3 °F (36 3 °C)   Resp 12   Ht 5' 6" (1 676 m)   Wt 86 2 kg (190 lb)   SpO2 98%   BMI 30 67 kg/m²     Physical Exam  Constitutional:       General: She is not in acute distress  Appearance: She is well-developed  She is obese  She is not diaphoretic  HENT:      Head: Normocephalic  Mouth/Throat:      Pharynx: No oropharyngeal exudate  Eyes:      Pupils: Pupils are equal, round, and reactive to light  Neck:      Thyroid: No thyroid mass, thyromegaly or thyroid tenderness  Cardiovascular:      Rate and Rhythm: Normal rate and regular rhythm  Heart sounds: No murmur heard  Pulmonary:      Effort: Pulmonary effort is normal  No respiratory distress  Breath sounds: No wheezing or rales  Abdominal:      General: Bowel sounds are normal       Palpations: Abdomen is soft  Tenderness: There is no abdominal tenderness  Musculoskeletal:         General: No tenderness  Normal range of motion  Cervical back: Normal range of motion  Lymphadenopathy:      Cervical: No cervical adenopathy  Skin:     General: Skin is warm  Neurological:      Mental Status: She is alert and oriented to person, place, and time         [address] [place] [address] Medicine Resident

## 2023-01-09 NOTE — ASSESSMENT & PLAN NOTE
[facility] to have thyroid check  Patient feels okay  Patient is taking levothyroxine 50 mcg early in the morning  Patient has not had TSH checked in for a while  Patient has mild constipation  Patient denies cold intolerance, bradycardia   No thyroid nodules or tendernss  Will check TSH, free T4, CBC, CMP and lipid panel  Follow up in 4 weeks

## 2023-01-10 ENCOUNTER — APPOINTMENT (OUTPATIENT)
Dept: LAB | Facility: HOSPITAL | Age: 53
End: 2023-01-10

## 2023-01-10 DIAGNOSIS — E03.9 ACQUIRED HYPOTHYROIDISM: ICD-10-CM

## 2023-01-10 LAB
ALBUMIN SERPL BCP-MCNC: 4.3 G/DL (ref 3.5–5)
ALP SERPL-CCNC: 55 U/L (ref 34–104)
ALT SERPL W P-5'-P-CCNC: 12 U/L (ref 7–52)
ANION GAP SERPL CALCULATED.3IONS-SCNC: 10 MMOL/L (ref 4–13)
AST SERPL W P-5'-P-CCNC: 16 U/L (ref 13–39)
BASOPHILS # BLD AUTO: 0.05 THOUSANDS/ÂΜL (ref 0–0.1)
BASOPHILS NFR BLD AUTO: 1 % (ref 0–1)
BILIRUB SERPL-MCNC: 0.65 MG/DL (ref 0.2–1)
BUN SERPL-MCNC: 18 MG/DL (ref 5–25)
CALCIUM SERPL-MCNC: 9.8 MG/DL (ref 8.4–10.2)
CHLORIDE SERPL-SCNC: 103 MMOL/L (ref 96–108)
CHOLEST SERPL-MCNC: 171 MG/DL
CO2 SERPL-SCNC: 26 MMOL/L (ref 21–32)
CREAT SERPL-MCNC: 0.85 MG/DL (ref 0.6–1.3)
EOSINOPHIL # BLD AUTO: 0.26 THOUSAND/ÂΜL (ref 0–0.61)
EOSINOPHIL NFR BLD AUTO: 4 % (ref 0–6)
ERYTHROCYTE [DISTWIDTH] IN BLOOD BY AUTOMATED COUNT: 14 % (ref 11.6–15.1)
GFR SERPL CREATININE-BSD FRML MDRD: 78 ML/MIN/1.73SQ M
GLUCOSE P FAST SERPL-MCNC: 107 MG/DL (ref 65–99)
HCT VFR BLD AUTO: 41.2 % (ref 34.8–46.1)
HDLC SERPL-MCNC: 19 MG/DL
HGB BLD-MCNC: 13.4 G/DL (ref 11.5–15.4)
IMM GRANULOCYTES # BLD AUTO: 0.01 THOUSAND/UL (ref 0–0.2)
IMM GRANULOCYTES NFR BLD AUTO: 0 % (ref 0–2)
LDLC SERPL CALC-MCNC: 125 MG/DL (ref 0–100)
LYMPHOCYTES # BLD AUTO: 2.06 THOUSANDS/ÂΜL (ref 0.6–4.47)
LYMPHOCYTES NFR BLD AUTO: 31 % (ref 14–44)
MCH RBC QN AUTO: 27.2 PG (ref 26.8–34.3)
MCHC RBC AUTO-ENTMCNC: 32.5 G/DL (ref 31.4–37.4)
MCV RBC AUTO: 84 FL (ref 82–98)
MONOCYTES # BLD AUTO: 0.38 THOUSAND/ÂΜL (ref 0.17–1.22)
MONOCYTES NFR BLD AUTO: 6 % (ref 4–12)
NEUTROPHILS # BLD AUTO: 3.93 THOUSANDS/ÂΜL (ref 1.85–7.62)
NEUTS SEG NFR BLD AUTO: 58 % (ref 43–75)
NONHDLC SERPL-MCNC: 152 MG/DL
NRBC BLD AUTO-RTO: 0 /100 WBCS
PLATELET # BLD AUTO: 176 THOUSANDS/UL (ref 149–390)
PMV BLD AUTO: 11.9 FL (ref 8.9–12.7)
POTASSIUM SERPL-SCNC: 4.1 MMOL/L (ref 3.5–5.3)
PROT SERPL-MCNC: 7.7 G/DL (ref 6.4–8.4)
RBC # BLD AUTO: 4.92 MILLION/UL (ref 3.81–5.12)
SODIUM SERPL-SCNC: 139 MMOL/L (ref 135–147)
T4 FREE SERPL-MCNC: 1.09 NG/DL (ref 0.76–1.46)
TRIGL SERPL-MCNC: 137 MG/DL
TSH SERPL DL<=0.05 MIU/L-ACNC: 1.87 UIU/ML (ref 0.45–4.5)
WBC # BLD AUTO: 6.69 THOUSAND/UL (ref 4.31–10.16)

## 2023-01-28 PROBLEM — J01.10 ACUTE FRONTAL SINUSITIS: Status: RESOLVED | Noted: 2022-11-29 | Resolved: 2023-01-28

## 2023-02-22 ENCOUNTER — EVALUATION (OUTPATIENT)
Dept: PHYSICAL THERAPY | Facility: REHABILITATION | Age: 53
End: 2023-02-22

## 2023-02-22 DIAGNOSIS — H83.2X1 VESTIBULAR HYPOFUNCTION OF RIGHT EAR: ICD-10-CM

## 2023-02-22 DIAGNOSIS — R42 VERTIGO: Primary | ICD-10-CM

## 2023-02-22 NOTE — PROGRESS NOTES
PT Evaluation     Today's date: 2023  Patient name: Shreyas Frank  : 1970  MRN: [id number]  Referring provider: Matthew Walters MD  Dx:   Encounter Diagnosis     ICD-10-CM    1  Vertigo  R42       2  Vestibular hypofunction of right ear  H83 2X1           Start Time: 1005  Stop Time: 1100  Total time in clinic (min): 55 minutes    Assessment  Assessment details: Shreyas Frank is a 47y o  year old female who presents with acute dizziness  Current deficits include vestibular dysfunction, impaired static/dynamic balance, impaired gait, and impaired activity tolerance  These deficits impair her ability to perform all typical I/ADLs, household tasks, and social/recreational activities  Signs and symptoms are consistent with suspected R sided vestibular hypofunction however, noted eye slip with corrective saccade during head thrust test bilaterally  Recommend skilled PT services to address previously stated deficits to promote progress towards PLOF  Impairments: abnormal gait, activity intolerance, impaired balance and lacks appropriate home exercise program  Other impairment: Vestibular dysfunction  Understanding of Dx/Px/POC: good   Prognosis: fair    Goals  STG (4 weeks):  1  Decrease frequency of dizziness by 50% for improved activity tolerance  2  Able to complete 75% of household tasks with minimal reports of symptom provocation to promote ability to care for her home  LTG (8 weeks):  1  Decrease dizziness by 90% for improved activity tolerance  2  Able to sleep in bed without symptom provocation for improved sleep quality  3  Independent with HEP  4  Able to drive without symptom provocation to promote return to PLOF  Plan  Plan details: Thank you for referring Shreyas Frank to Physical Therapy at [place] [address] and for the opportunity to coordinate care      Patient would benefit from: skilled physical therapy  Planned therapy interventions: abdominal trunk stabilization, activity modification, ADL retraining, balance, balance/weight bearing training, behavior modification, body mechanics training, breathing training, canalith repositioning, flexibility, functional ROM exercises, gait training, graded activity, graded exercise, graded motor, home exercise program, transfer training, therapeutic training, therapeutic exercise, therapeutic activities, stretching, strengthening, self care, postural training, patient education, neuromuscular re-education, muscle pump exercises, motor coordination training, Harper taping, manual therapy, joint mobilization and IADL retraining  Frequency: 2x week  Duration in weeks: 8  Plan of Care beginning date: 2/22/2023  Plan of Care expiration date: 4/19/2023  Treatment plan discussed with: patient        Subjective Evaluation    History of Present Illness  Date of onset: 2/11/2023  Mechanism of injury: Toy Glass presents to skilled OP PT with complaints of dizziness  States she does remember similar sensation after being on a boat in the [facility] but not as severe  Adan Tyrone reports symptoms began on Saturday 2/11/23  States she noticed a little dizziness when laying on her side in bed on Saturday  Denies symptoms on Sunday and Monday  States Tuesday morning she felt dizziness when she woke up to use the bathroom around 5:30 am; notes she noticed the dizziness when she was sitting using the bathroom  Symptom intensity has gradually lessened but have generally stayed the same over the past week  States she could feel her eyes moving to the R side  Reports a sinus infection at the end of October which took a while to clear up but did clear with antibiotics  Since onset, symptoms have worsened  Concurrent symptoms include:   Tinnitus: Yes   L only for about 1 year  Aural Fullness: No  Known hearing loss: Yes, reports not hearing well due to background noise   Nausea, Vomiting: Yes  Altered Vision: No  Poor Concentration: Yes  Memory Loss: No  Peripheral Neuropathy: No    Exacerbating factors include:  Bending over: Yes  Turning Head: Yes  Rolling in bed: Yes  Walking: Yes  Looking up: Yes  Supine to/from sitting: Yes  Optokinetic movement: Yes, much less compared to head movement  Walking in busy environment: Yes     Currently, Roque Lyles is having difficulty with watching movement, driving, working (), putting make up on, "anything that requires movement is difficult "    Social Support  Steps to enter house: yes  4  Stairs in house: yes   Lives in: multiple-level home  Lives with: spouse and adult children    Employment status: working    Diagnostic Tests  MRI studies: normal  CT scan: normal  Treatments  Current treatment: physical therapy  Patient Goals  Patient goal: decrease dizziness        Objective   Neuro Exam:     Oculomotor exam   Oculomotor ROM: WNL  Resting nystagmus: not present   Gaze holding nystagmus: present right (minor R beating)  Gaze holding nystagmus: not present left   Smooth pursuits: within normal limits  Vertical saccades: hypometric and corrective upward saccade bilaterally, no symptom provocation  Horizontal saccades: hypometric  and no symptom provocation  Convergence: normal  Head thrust: left abnormal and right abnormal           Precautions: fall risk, insomnia, hypothyroidism, hypertriglyceridemia, asthma, arthritis, anemia    *indicates included in HEP     2/22            Neuro Re-Ed             Bike nv            VORx1 - horizontal nv            VORx1 - vertical nv            VORx2 - horizontal             VORx2 - vertical             VOR cancellation - horizontal nv            VOR cancellation - vertical             Saccades - horizontal             Saccades - vertical             Smooth pursuits             Convergence pencil push ups             NBOS balance             Standing with head turns             Tandem balance             SL balance             Tandem walking             Walking with head turns Patient education done                         Ther Exercise                                       Ther Activity

## 2023-02-23 ENCOUNTER — OFFICE VISIT (OUTPATIENT)
Dept: PHYSICAL THERAPY | Facility: REHABILITATION | Age: 53
End: 2023-02-23

## 2023-02-23 DIAGNOSIS — H83.2X1 VESTIBULAR HYPOFUNCTION OF RIGHT EAR: ICD-10-CM

## 2023-02-23 DIAGNOSIS — R42 VERTIGO: Primary | ICD-10-CM

## 2023-02-23 NOTE — PROGRESS NOTES
Daily Note     Today's date: 2023  Patient name: Tyrel Garcia  : 1970  MRN: [id number]  Referring provider: Patrick Gupta MD  Dx:   Encounter Diagnosis     ICD-10-CM    1  Vertigo  R42       2  Vestibular hypofunction of right ear  H83 2X1           Start Time: 933  Stop Time: 1020  Total time in clinic (min): 47 minutes    Subjective: Roque Lyles reports a slight improvement in dizziness today compared to yesterday  States she's been trying to move her head more and minimize keeping her head still  Objective: See treatment diary below      Assessment: Tolerated treatment well  VOR exercises performed in front of plain wall to minimize visual stimulus; will progress to increasingly challenging visual backgrounds as tolerated  Challenged with weight shifting, particularly posteriorly, during [employer] LOS but improvement in weight shifting accuracy with increased reps  Patient demonstrated appropriate level of challenge and muscular fatigue throughout session and noted good status at end of visit  Updated and reviewed HEP with patient; patient verbalized and demonstrated understanding of all exercises  Patient demonstrated fatigue post treatment, exhibited good technique with therapeutic exercises and would benefit from continued PT  Plan: Continue per plan of care  Progress treatment as tolerated  Precautions: fall risk, insomnia, hypothyroidism, hypertriglyceridemia, asthma, arthritis, anemia    *indicates included in HEP  HEP code:  3HVBTVXY                 Neuro Re-Ed             Bike nv 5' lvl 1           VORx1 - horizontal* nv :15x4           VORx1 - vertical* nv :15x4           VORx2 - horizontal             VORx2 - vertical             VOR cancellation - horizontal* nv :15x4           VOR cancellation - vertical*  :15x4           Saccades - horizontal             Saccades - vertical             Smooth pursuits             Convergence pencil push ups             NBOS balance             Standing with head turns             Tandem balance             SL balance             Tandem walking             Walking with head turns             [employer] LOS  4x - med, plate locked                        Patient education done done                        Ther Exercise                                       Ther Activity

## 2023-02-28 ENCOUNTER — OFFICE VISIT (OUTPATIENT)
Dept: PHYSICAL THERAPY | Facility: REHABILITATION | Age: 53
End: 2023-02-28

## 2023-02-28 DIAGNOSIS — R42 VERTIGO: Primary | ICD-10-CM

## 2023-02-28 DIAGNOSIS — H83.2X1 VESTIBULAR HYPOFUNCTION OF RIGHT EAR: ICD-10-CM

## 2023-02-28 NOTE — PROGRESS NOTES
Daily Note     Today's date: 2023  Patient name: Fernando Perry  : 1970  MRN: [id number]  Referring provider: Marcus Griffiths MD  Dx:   Encounter Diagnosis     ICD-10-CM    1  Vertigo  R42       2  Vestibular hypofunction of right ear  H83 2X1           Start Time: 1018  Stop Time: 1058  Total time in clinic (min): 40 minutes    Subjective: Alicia Lopez reports a great improvement in symptoms since last session  States she had seen a chiropractor since last visit and had a lot of tightness in the neck and shoulders region; has another follow up with chiropractic on Friday  Continues to report good compliance with HEP  Objective: See treatment diary below      Assessment: Tolerated treatment well  Challenged with walking with head turns but no significant LOB demonstrated  Able to maintain  NBOS with EC, increased sway and ankle strategy utilized but able to maintain without LOB  Patient demonstrated appropriate level of challenge and muscular fatigue throughout session and noted good status at end of visit  Patient demonstrated fatigue post treatment, exhibited good technique with therapeutic exercises and would benefit from continued PT  Plan: Continue per plan of care  Progress treatment as tolerated  Precautions: fall risk, insomnia, hypothyroidism, hypertriglyceridemia, asthma, arthritis, anemia    *indicates included in HEP  HEP code:  [id number]                Neuro Re-Ed             Bike nv 5' lvl 1           VORx1 - horizontal* nv :15x4 :25x4          VORx1 - vertical* nv :15x4 :25x4          VORx2 - horizontal             VORx2 - vertical             VOR cancellation - horizontal* nv :15x4 :25x4          VOR cancellation - vertical*  :15x4 :25x4          Saccades - horizontal             Saccades - vertical             Smooth pursuits             Convergence pencil push ups             NBOS balance   Floor EC 1'x2          Standing with head turns             Tandem balance             SL balance             Tandem walking             Walking with horizontal  head turns   3 laps          Walking with vertical head turns   3 laps          [employer] LOS  4x - med, plate locked 6x - med, plate locked                       Patient education done done                        Ther Exercise                                       Ther Activity

## 2023-03-02 ENCOUNTER — OFFICE VISIT (OUTPATIENT)
Dept: PHYSICAL THERAPY | Facility: REHABILITATION | Age: 53
End: 2023-03-02

## 2023-03-02 DIAGNOSIS — R42 VERTIGO: Primary | ICD-10-CM

## 2023-03-02 DIAGNOSIS — H83.2X1 VESTIBULAR HYPOFUNCTION OF RIGHT EAR: ICD-10-CM

## 2023-03-02 NOTE — PROGRESS NOTES
Daily Note     Today's date: 3/2/2023  Patient name: Dulce Bernard  : 1970  MRN: [id number]  Referring provider: Hortensia Gonzalez MD  Dx:   Encounter Diagnosis     ICD-10-CM    1  Vertigo  R42       2  Vestibular hypofunction of right ear  H83 2X1           Start Time: 0800  Stop Time: 0844  Total time in clinic (min): 44 minutes    Subjective: Carine Tran reports great improvement regarding symptoms since starting PT but does continue to get slight symptom provocation when turning her head to the R        Objective: See treatment diary below      Assessment: Tolerated treatment well  Demonstrates improved tolerance to increased time for VOR based exercises in addition to improved weight shifting during [employer] LOS this session  Patient demonstrated appropriate level of challenge and muscular fatigue throughout session and noted good status at end of visit  Patient demonstrated fatigue post treatment, exhibited good technique with therapeutic exercises and would benefit from continued PT  Plan: Continue per plan of care  Progress treatment as tolerated  Precautions: fall risk, insomnia, hypothyroidism, hypertriglyceridemia, asthma, arthritis, anemia    *indicates included in HEP  HEP code:  3HVBTVXY      2/22 2/23 2/28 3/2         Neuro Re-Ed             Bike nv 5' lvl 1           VORx1 - horizontal* nv :15x4 :25x4 :30x4         VORx1 - vertical* nv :15x4 :25x4 :30x4         VORx2 - horizontal             VORx2 - vertical             VOR cancellation - horizontal* nv :15x4 :25x4 :30x4         VOR cancellation - vertical*  :15x4 :25x4 :30x4         Saccades - horizontal             Saccades - vertical             Smooth pursuits             Convergence pencil push ups             NBOS balance   Floor EC 1'x2          Standing with head turns             Tandem balance             SL balance             Tandem walking             Walking with horizontal  head turns   3 laps 3 laps         Walking with vertical head turns   3 laps 3 laps         [employer] LOS  4x - med, plate locked 6x - med, plate locked 5x - med, plate locked, 4x - med plate unlocked to 12                      Patient education done done  done                      Ther Exercise             UT stretch    10x5" ea         leavtor scap stretch    10x5" ea                                   Ther Activity

## 2023-03-07 ENCOUNTER — OFFICE VISIT (OUTPATIENT)
Dept: PHYSICAL THERAPY | Facility: REHABILITATION | Age: 53
End: 2023-03-07

## 2023-03-07 DIAGNOSIS — H83.2X1 VESTIBULAR HYPOFUNCTION OF RIGHT EAR: ICD-10-CM

## 2023-03-07 DIAGNOSIS — R42 VERTIGO: Primary | ICD-10-CM

## 2023-03-07 NOTE — PROGRESS NOTES
Daily Note     Today's date: 3/7/2023  Patient name: Ely Joy  : 1970  MRN: [id number]  Referring provider: Johanna Jones MD  Dx:   Encounter Diagnosis     ICD-10-CM    1  Vertigo  R42       2  Vestibular hypofunction of right ear  H83 2X1           Start Time: 0800  Stop Time: 0843  Total time in clinic (min): 43 minutes    Subjective: Camille Diaz reports 98% improvement in dizziness but does state that her neck continues to be bothersome  Objective: See treatment diary below      Assessment: Tolerated treatment well  Good response to increased time for each reps of VOR based exercises  Challenged with standing with head turns of foam with significant sway noted during first rep of horizontal and vertical but improvement with increased reps  Patient demonstrated appropriate level of challenge and muscular fatigue throughout session and noted good status at end of visit  Patient demonstrated fatigue post treatment, exhibited good technique with therapeutic exercises and would benefit from continued PT  Plan: Continue per plan of care  Progress treatment as tolerated  Precautions: fall risk, insomnia, hypothyroidism, hypertriglyceridemia, asthma, arthritis, anemia    *indicates included in HEP  HEP code:  3HVBTVXY      2/22 2/23 2/28 3/2 3/7     Neuro Re-Ed          Bike nv 5' lvl 1        VORx1 - horizontal* nv :15x4 :25x4 :30x4 :45x4     VORx1 - vertical* nv :15x4 :25x4 :30x4 :45x4     VORx2 - horizontal          VORx2 - vertical          VOR cancellation - horizontal* nv :15x4 :25x4 :30x4 :45x4     VOR cancellation - vertical*  :15x4 :25x4 :30x4 :45x4     Saccades - horizontal          Saccades - vertical          Smooth pursuits          Convergence pencil push ups          NBOS balance   Floor EC 1'x2       Standing with head turns     :30x3 foam EO w/ horizontal head turns; :30x3 foam EO w/ vertical head turns     Tandem balance          SL balance          Tandem walking Walking with horizontal  head turns   3 laps 3 laps      Walking with vertical head turns   3 laps 3 laps      [employer] LOS  4x - med, plate locked 6x - med, plate locked 5x - med, plate locked, 4x - med plate unlocked to 12 2x med, plate locked; 3x large, plate locked; 5x - med plate unlocked to 12               Patient education done done  done                Ther Exercise          UT stretch    10x5" ea 10x5" ea     leavtor scap stretch    10x5" ea 10x5" ea                         Ther Activity

## 2023-03-09 ENCOUNTER — OFFICE VISIT (OUTPATIENT)
Dept: PHYSICAL THERAPY | Facility: REHABILITATION | Age: 53
End: 2023-03-09

## 2023-03-09 DIAGNOSIS — R42 VERTIGO: Primary | ICD-10-CM

## 2023-03-09 DIAGNOSIS — H83.2X1 VESTIBULAR HYPOFUNCTION OF RIGHT EAR: ICD-10-CM

## 2023-03-09 NOTE — PROGRESS NOTES
PT Discharge    Today's date: 3/9/2023  Patient name: Delbert Del Rosario  : 1970  MRN: [id number]  Referring provider: Caryle Maryland, MD  Dx:   Encounter Diagnosis     ICD-10-CM    1  Vertigo  R42       2  Vestibular hypofunction of right ear  H83 2X1           Start Time: 845  Stop Time: 928  Total time in clinic (min): 43 minutes    Assessment  Assessment details: Per patient report and clinical findings, Delbert Del Rosario has made good progress since starting skilled physical therapy services  To this date, Delbert Del Rosario has completed 6 visits of skilled physical therapy  Noted improvements include improved activity tolerance and significant improvement in symptoms  After discussion and mutual agreement with patient, recommend discharge to independent HEP at this time secondary to improved status, return to PLOF, and achieving goals set at   Updated and reviewed HEP with patient; patient verbalized and demonstrated understanding of all exercises  Understanding of Dx/Px/POC: good   Prognosis: fair    Goals  STG (4 weeks):  1  Decrease frequency of dizziness by 50% for improved activity tolerance  - met  2  Able to complete 75% of household tasks with minimal reports of symptom provocation to promote ability to care for her home  - met     LTG (8 weeks):  1  Decrease dizziness by 90% for improved activity tolerance  - met  2  Able to sleep in bed without symptom provocation for improved sleep quality  - met  3  Independent with HEP  - met  4  Able to drive without symptom provocation to promote return to PLOF  - met    Plan  Plan details: Thank you for referring Delbert Del Rosario to Physical Therapy at [place] [address] and for the opportunity to coordinate care      Planned therapy interventions: home exercise program  Duration in visits: 1  Plan of Care beginning date: 3/9/2023  Treatment plan discussed with: patient        Subjective Evaluation    History of Present Illness  Date of onset: 2/11/2023  Mechanism of injury:   03/09/23:  Evalene Apley reports feeling she has made 99% progress since IE  Robert Hinton reports improvements in movement and activity tolerance  Despite improvements, Robert Hinton reports minimal provocation with quick head turns to the R     2/22/23:  Evalene Apley presents to skilled OP PT with complaints of dizziness  States she does remember similar sensation after being on a boat in the [facility] but not as severe  Robert Hinton reports symptoms began on Saturday 2/11/23  States she noticed a little dizziness when laying on her side in bed on Saturday  Denies symptoms on Sunday and Monday  States Tuesday morning she felt dizziness when she woke up to use the bathroom around 5:30 am; notes she noticed the dizziness when she was sitting using the bathroom  Symptom intensity has gradually lessened but have generally stayed the same over the past week  States she could feel her eyes moving to the R side  Reports a sinus infection at the end of October which took a while to clear up but did clear with antibiotics  Since onset, symptoms have worsened  Concurrent symptoms include:   Tinnitus: Yes   L only for about 1 year  Aural Fullness: No  Known hearing loss: Yes, reports not hearing well due to background noise   Nausea, Vomiting: Yes  Altered Vision: No  Poor Concentration: Yes  Memory Loss: No  Peripheral Neuropathy: No    Exacerbating factors include:  Bending over: Yes  Turning Head: Yes  Rolling in bed: Yes  Walking: Yes  Looking up: Yes  Supine to/from sitting: Yes  Optokinetic movement: Yes, much less compared to head movement  Walking in busy environment: Yes     Currently, Robert Hinton is having difficulty with watching movement, driving, working (), putting make up on, "anything that requires movement is difficult "    Social Support  Steps to enter house: yes  4  Stairs in house: yes   Lives in: multiple-level home  Lives with: spouse and adult children    Employment status: working    Diagnostic Tests  MRI studies: normal  CT scan: normal  Treatments  Current treatment: physical therapy  Patient Goals  Patient goal: decrease dizziness        Objective     Concurrent Complaints  Negative for nausea/motion sickness, visual change, hearing loss, memory loss, aural fullness, poor concentration and peripheral neuropathyTinnitus: L but unchanged with dizziness onset  Neuro Exam:     Dizziness  Negative for disequilibrium, vertigo, oscillopsia, motion sickness, light-headedness, rocking or swaying, diplopia and floating or swimming  Exacerbating factors  Negative for bending over, rolling in bed, looking up, walking, supine to/from sitting, optokinetic movement and walking in busy environment  Turning head: sometimes with quick turns to the R             Precautions: fall risk, insomnia, hypothyroidism, hypertriglyceridemia, asthma, arthritis, anemia    *indicates included in HEP  HEP code:  3HVBTVXY      2/22 2/23 2/28 3/2 3/7 3/9    Neuro Re-Ed          Bike nv 5' lvl 1        VORx1 - horizontal* nv :15x4 :25x4 :30x4 :45x4 :45x4    VORx1 - vertical* nv :15x4 :25x4 :30x4 :45x4 :45x4    VORx2 - horizontal          VORx2 - vertical          VOR cancellation - horizontal* nv :15x4 :25x4 :30x4 :45x4 :45x4    VOR cancellation - vertical*  :15x4 :25x4 :30x4 :45x4 :45x4    Saccades - horizontal          Saccades - vertical          Smooth pursuits          Convergence pencil push ups          NBOS balance   Floor EC 1'x2       Standing with head turns     :30x3 foam EO w/ horizontal head turns; :30x3 foam EO w/ vertical head turns     Tandem balance          SL balance          Tandem walking          Walking with horizontal  head turns   3 laps 3 laps      Walking with vertical head turns   3 laps 3 laps      [employer] LOS  4x - med, plate locked 6x - med, plate locked 5x - med, plate locked, 4x - med plate unlocked to 12 2x med, plate locked; 3x large, plate locked; 5x - med plate unlocked to 12 5x med w/ plate locked; 5x med w/ plate unlocked to 12; 5x large w/ plate unlocked to 12              Patient education done done  done  done              Ther Exercise          UT stretch    10x5" ea 10x5" ea 10x5" ea    leavtor scap stretch    10x5" ea 10x5" ea 10x5" ea                        Ther Activity

## 2023-03-14 ENCOUNTER — APPOINTMENT (OUTPATIENT)
Dept: PHYSICAL THERAPY | Facility: REHABILITATION | Age: 53
End: 2023-03-14

## 2023-03-15 ENCOUNTER — VBI (OUTPATIENT)
Dept: FAMILY MEDICINE CLINIC | Facility: CLINIC | Age: 53
End: 2023-03-15

## 2023-03-15 NOTE — TELEPHONE ENCOUNTER
ED Visit Information     Ed visit date: 2-14-23  Diagnosis Description: vertigo/vomitting  In Network? No  Discharge status: Home  Discharged with meds ? Yes  Number of ED visits to date: 1  ED Severity:6     Outreach Information    Outreach successful: Yes 1  Date letter mailed:  Date Finalized:3-15-23    Care Coordination    Follow up appointment with pcp: no contacted office and was informed that they didn't need to see her  Transportation issues ?  No    [employer] Outreach    Emergent necessity warranted by diagnosis:  Yes  [facility] PCP:  Yes  Transportation:  Friend/Family Transport  Called PCP first?:  No  Feels able to call PCP for urgent problems ?:  Yes  Understands what emergencies can be handled by PCP ?:  Yes  Ever any problems getting appointment with PCP for minor emergency/urgency problems?:  No  Practice Contacted Patient ?:  No  Pt had ED follow up with pcp/staff ?:  No    Seen for follow-up out of network ?:  No  Urgent care Education?:  Yes

## 2023-03-17 ENCOUNTER — APPOINTMENT (OUTPATIENT)
Dept: PHYSICAL THERAPY | Facility: REHABILITATION | Age: 53
End: 2023-03-17

## 2023-03-19 DIAGNOSIS — E03.9 ACQUIRED HYPOTHYROIDISM: ICD-10-CM

## 2023-03-20 RX ORDER — LEVOTHYROXINE SODIUM 0.05 MG/1
TABLET ORAL
Qty: 90 TABLET | Refills: 3 | Status: SHIPPED | OUTPATIENT
Start: 2023-03-20

## 2023-03-21 ENCOUNTER — APPOINTMENT (OUTPATIENT)
Dept: PHYSICAL THERAPY | Facility: REHABILITATION | Age: 53
End: 2023-03-21

## 2023-03-23 ENCOUNTER — APPOINTMENT (OUTPATIENT)
Dept: PHYSICAL THERAPY | Facility: REHABILITATION | Age: 53
End: 2023-03-23

## 2023-03-28 ENCOUNTER — APPOINTMENT (OUTPATIENT)
Dept: PHYSICAL THERAPY | Facility: REHABILITATION | Age: 53
End: 2023-03-28

## 2023-03-30 ENCOUNTER — APPOINTMENT (OUTPATIENT)
Dept: PHYSICAL THERAPY | Facility: REHABILITATION | Age: 53
End: 2023-03-30

## 2023-05-08 ENCOUNTER — OFFICE VISIT (OUTPATIENT)
Dept: FAMILY MEDICINE CLINIC | Facility: CLINIC | Age: 53
End: 2023-05-08

## 2023-05-08 VITALS
DIASTOLIC BLOOD PRESSURE: 76 MMHG | OXYGEN SATURATION: 98 % | SYSTOLIC BLOOD PRESSURE: 130 MMHG | TEMPERATURE: 97.4 F | RESPIRATION RATE: 16 BRPM | HEART RATE: 76 BPM | HEIGHT: 66 IN | WEIGHT: 186 LBS | BODY MASS INDEX: 29.89 KG/M2

## 2023-05-08 DIAGNOSIS — J20.9 ACUTE BRONCHITIS, UNSPECIFIED ORGANISM: ICD-10-CM

## 2023-05-08 DIAGNOSIS — R73.03 PREDIABETES: ICD-10-CM

## 2023-05-08 DIAGNOSIS — I67.1 ANEURYSM OF ANTERIOR COMMUNICATING ARTERY: ICD-10-CM

## 2023-05-08 DIAGNOSIS — E66.09 CLASS 1 OBESITY DUE TO EXCESS CALORIES WITHOUT SERIOUS COMORBIDITY WITH BODY MASS INDEX (BMI) OF 30.0 TO 30.9 IN ADULT: ICD-10-CM

## 2023-05-08 DIAGNOSIS — J00 NASOPHARYNGITIS ACUTE: Primary | ICD-10-CM

## 2023-05-08 LAB
S PYO AG THROAT QL: NEGATIVE
SARS-COV-2 AG UPPER RESP QL IA: NEGATIVE
VALID CONTROL: NORMAL

## 2023-05-08 RX ORDER — AZITHROMYCIN 250 MG/1
TABLET, FILM COATED ORAL
Qty: 6 TABLET | Refills: 0 | Status: SHIPPED | OUTPATIENT
Start: 2023-05-08 | End: 2023-05-13

## 2023-05-08 NOTE — PROGRESS NOTES
Subjective:      Patient ID: Alicia Mojica is a 47 y o  female  With 3-4 days of cough with productive phlegm, congestion  Did not do home covid-19 test, but has not been around sick cases  Usually gets sinus infections    Cough  Pertinent negatives include no chest pain, eye redness, fever, headaches, myalgias, rash, rhinorrhea, sore throat, shortness of breath or wheezing  Past Medical History:   Diagnosis Date   • Abnormal Pap smear of cervix    • Anemia    • Arthritis    • Asthma    • Hyperlipemia     triglycerides   • Hypertriglyceridemia    • Hypothyroidism    • Impaired fasting glucose    • Insomnia    • Lab test positive for detection of COVID-19 virus 12/22/2021   • Premenstrual tension syndrome    • Tendonitis        Family History   Problem Relation Age of Onset   • Colon cancer Mother 67   • Diabetes Mother    • Hypertension Mother    • Heart disease Father    • Hyperlipidemia Father    • Heart attack Father    • Heart failure Father    • Alzheimer's disease Maternal Aunt    • No Known Problems Son    • No Known Problems Sister    • Breast cancer Maternal Grandmother         52's ? • No Known Problems Maternal Grandfather    • Brain cancer Paternal Grandmother         young age   • No Known Problems Paternal Grandfather    • No Known Problems Paternal Uncle        Past Surgical History:   Procedure Laterality Date   • COLONOSCOPY  2016   • MAMMO (HISTORICAL) Bilateral 11/03/2020   • WISDOM TOOTH EXTRACTION          reports that she has never smoked  She has never used smokeless tobacco  She reports current alcohol use  She reports that she does not use drugs  Current Outpatient Medications:   •  azithromycin (Zithromax) 250 mg tablet, Take 2 tablets (500 mg total) by mouth daily for 1 day, THEN 1 tablet (250 mg total) daily for 4 days  , Disp: 6 tablet, Rfl: 0  •  levothyroxine 50 mcg tablet, TAKE 1 TABLET BY MOUTH EVERY DAY, Disp: 90 tablet, Rfl: 3  •  Multiple Vitamin (MULTI-VITAMIN "DAILY PO), Take by mouth, Disp: , Rfl:     The following portions of the patient's history were reviewed and updated as appropriate: allergies, current medications, past family history, past medical history, past social history, past surgical history and problem list     Review of Systems   Constitutional: Negative for fatigue and fever  HENT: Positive for congestion  Negative for facial swelling, mouth sores, rhinorrhea, sore throat and trouble swallowing  Eyes: Negative for pain and redness  Respiratory: Positive for cough  Negative for shortness of breath and wheezing  Cardiovascular: Negative for chest pain, palpitations and leg swelling  Gastrointestinal: Negative for abdominal pain, blood in stool, constipation, diarrhea and nausea  Genitourinary: Negative for dysuria, hematuria and urgency  Musculoskeletal: Negative for arthralgias, back pain and myalgias  Skin: Negative for rash and wound  Neurological: Negative for seizures, syncope and headaches  Hematological: Negative for adenopathy  Psychiatric/Behavioral: Negative for agitation and behavioral problems  PHQ-2/9 Depression Screening    Little interest or pleasure in doing things: 0 - not at all  Feeling down, depressed, or hopeless: 0 - not at all  PHQ-2 Score: 0  PHQ-2 Interpretation: Negative depression screen             Objective:    /76 (BP Location: Left arm, Patient Position: Sitting, Cuff Size: Adult)   Pulse 76   Temp (!) 97 4 °F (36 3 °C) (Tympanic)   Resp 16   Ht 5' 6\" (1 676 m)   Wt 84 4 kg (186 lb)   SpO2 98%   BMI 30 02 kg/m²      Physical Exam  Vitals and nursing note reviewed  Constitutional:       Appearance: Normal appearance  She is well-developed  She is not ill-appearing  HENT:      Head: Normocephalic and atraumatic  Right Ear: Tympanic membrane, ear canal and external ear normal       Left Ear: Tympanic membrane, ear canal and external ear normal       Nose: Congestion present   " Mouth/Throat:      Mouth: Mucous membranes are moist       Pharynx: No oropharyngeal exudate or posterior oropharyngeal erythema  Eyes:      General: No scleral icterus  Right eye: No discharge  Left eye: No discharge  Conjunctiva/sclera: Conjunctivae normal    Cardiovascular:      Rate and Rhythm: Normal rate  Heart sounds: No murmur heard  No gallop  Pulmonary:      Effort: Pulmonary effort is normal  No respiratory distress  Breath sounds: No stridor  Rhonchi present  No wheezing or rales  Abdominal:      Palpations: Abdomen is soft  Tenderness: There is no abdominal tenderness  Musculoskeletal:         General: No tenderness or deformity  Skin:     Findings: No erythema or rash  Neurological:      Mental Status: She is alert  Mental status is at baseline  Psychiatric:         Behavior: Behavior normal          Judgment: Judgment normal            Recent Results (from the past 8736 hour(s))   Liquid-based pap, screening    Collection Time: 11/01/22  9:10 AM   Result Value Ref Range    Case Report       Gynecologic Cytology Report                       Case: [id number]                                  Authorizing Provider:  Leela Barrera PA-C    Collected:           11/01/2022 2900              Ordering Location:     [facility]    Received:            11/01/2022 0910                                     [facility]                                                             First Screen:          [facility]                                                                Specimen:    LIQUID-BASED PAP, SCREENING, Cervix                                                        Primary Interpretation Negative for intraepithelial lesion or malignancy     Specimen Adequacy       Satisfactory for evaluation  Absence of endocervical/transformation zone component      Additional Information       [email or web address]'s FDA approved ,  and ThinPrep Imaging Duo System are utilized with strict adherence to the 's instruction manual to prepare gynecologic and non-gynecologic cytology specimens for the production of ThinPrep slides as well as for gynecologic ThinPrep imaging  These processes have been validated by our laboratory and/or by the   The Pap test is not a diagnostic procedure and should not be used as the sole means to detect cervical cancer  It is only a screening procedure to aid in the detection of cervical cancer and its precursors  Both false-negative and false-positive results have been experienced  Your patient's test result should be interpreted in this context together with the history and clinical findings  HPV High Risk    Collection Time: 11/01/22  9:10 AM    Specimen: Cervix;  Thin-Prep Vial   Result Value Ref Range    HPV Other HR Negative Negative    HPV16 Negative Negative    HPV18 Negative Negative   TSH, 3rd generation    Collection Time: 01/10/23  7:26 AM   Result Value Ref Range    TSH 3RD GENERATON 1 867 0 450 - 4 500 uIU/mL   T4, free    Collection Time: 01/10/23  7:26 AM   Result Value Ref Range    Free T4 1 09 0 76 - 1 46 ng/dL   CBC and differential    Collection Time: 01/10/23  7:26 AM   Result Value Ref Range    WBC 6 69 4 31 - 10 16 Thousand/uL    RBC 4 92 3 81 - 5 12 Million/uL    Hemoglobin 13 4 11 5 - 15 4 g/dL    Hematocrit 41 2 34 8 - 46 1 %    MCV 84 82 - 98 fL    MCH 27 2 26 8 - 34 3 pg    MCHC 32 5 31 4 - 37 4 g/dL    RDW 14 0 11 6 - 15 1 %    MPV 11 9 8 9 - 12 7 fL    Platelets 129 812 - 750 Thousands/uL    nRBC 0 /100 WBCs    Neutrophils Relative 58 43 - 75 %    Immat GRANS % 0 0 - 2 %    Lymphocytes Relative 31 14 - 44 %    Monocytes Relative 6 4 - 12 %    Eosinophils Relative 4 0 - 6 %    Basophils Relative 1 0 - 1 %    Neutrophils Absolute 3 93 1 85 - 7 62 Thousands/µL    Immature Grans Absolute 0 01 0 00 - 0 20 Thousand/uL    Lymphocytes Absolute 2 06 0 60 - 4 47 Thousands/µL Monocytes Absolute 0 38 0 17 - 1 22 Thousand/µL    Eosinophils Absolute 0 26 0 00 - 0 61 Thousand/µL    Basophils Absolute 0 05 0 00 - 0 10 Thousands/µL   Comprehensive metabolic panel    Collection Time: 01/10/23  7:26 AM   Result Value Ref Range    Sodium 139 135 - 147 mmol/L    Potassium 4 1 3 5 - 5 3 mmol/L    Chloride 103 96 - 108 mmol/L    CO2 26 21 - 32 mmol/L    ANION GAP 10 4 - 13 mmol/L    BUN 18 5 - 25 mg/dL    Creatinine 0 85 0 60 - 1 30 mg/dL    Glucose, Fasting 107 (H) 65 - 99 mg/dL    Calcium 9 8 8 4 - 10 2 mg/dL    AST 16 13 - 39 U/L    ALT 12 7 - 52 U/L    Alkaline Phosphatase 55 34 - 104 U/L    Total Protein 7 7 6 4 - 8 4 g/dL    Albumin 4 3 3 5 - 5 0 g/dL    Total Bilirubin 0 65 0 20 - 1 00 mg/dL    eGFR 78 ml/min/1 73sq m   Lipid panel    Collection Time: 01/10/23  7:26 AM   Result Value Ref Range    Cholesterol 171 See Comment mg/dL    Triglycerides 137 See Comment mg/dL    HDL, Direct 19 (L) >=50 mg/dL    LDL Calculated 125 (H) 0 - 100 mg/dL    Non-HDL-Chol (CHOL-HDL) 152 mg/dl   HEMOGLOBIN A1C    Collection Time: 02/15/23  6:41 AM   Result Value Ref Range    Hemoglobin A1C 5 7 (H) <5 7 %    eAG, EST AVG Glucose 117 mg/dL mg/dL   POCT rapid strepA    Collection Time: 05/08/23  3:29 PM   Result Value Ref Range     RAPID STREP A Negative Negative   POCT Rapid Covid Ag    Collection Time: 05/08/23  3:30 PM   Result Value Ref Range    POCT SARS-CoV-2 Ag Negative Negative    VALID CONTROL Valid        Laboratory Results: I have personally reviewed the pertinent laboratory results/reports     Radiology/Other Diagnostic Testing Results: I have personally reviewed pertinent reports  XR chest pa & lateral    Result Date: 1/5/2022  CHEST INDICATION:   U07 1: COVID-19  Patient has confirmed COVID-19  COMPARISON:  12/22/2021 EXAM PERFORMED/VIEWS:  XR CHEST PA & LATERAL FINDINGS: Cardiomediastinal silhouette appears unremarkable  Mildly improved bilateral patchy groundglass infiltrates   No pneumothorax "or pleural effusion  Osseous structures appear within normal limits for patient age  Mildly improved bilateral patchy groundglass infiltrates  Workstation performed: [id number]        Assessment/Plan:  Problem List Items Addressed This Visit        Cardiovascular and Mediastinum    Aneurysm of anterior communicating artery   Other Visit Diagnoses     Nasopharyngitis acute    -  Primary    Relevant Medications    azithromycin (Zithromax) 250 mg tablet    Other Relevant Orders    POCT Rapid Covid Ag (Completed)    POCT rapid strepA (Completed)    Class 1 obesity due to excess calories without serious comorbidity with body mass index (BMI) of 30 0 to 30 9 in adult        Prediabetes        Acute bronchitis, unspecified organism            Rapid strep and COVID-19 negative,likely has complicated upper respiratory infection with bronchitis, since she has been worsening, recommend azithromycin for 5 days  Advised supportive care, encouraged increased fluid hydration, rest, tylenol/ ibuprofen PRN fever/ pain  No suspicion for Influenza at this time and no antiviral medication warranted  RTO if not improving or worsening  Read package inserts for all medications before starting a new medications, call me if you have any questions  Patient was given opportunity to ask questions and all questions were answered  Disclaimer: Portions of the record may have been created with voice recognition software  Occasional wrong word or \"sound a like\" substitutions may have occurred due to the inherent limitations of voice recognition software  Read the chart carefully and recognize, using context, where substitutions have occurred  I have used the Epic copy/forward function to compose this note  I have reviewed my current note to ensure it reflects the current patient status, exam, assessment and plan      "

## 2023-07-14 ENCOUNTER — RA CDI HCC (OUTPATIENT)
Dept: OTHER | Facility: HOSPITAL | Age: 53
End: 2023-07-14

## 2023-07-14 NOTE — PROGRESS NOTES
[address] [facility] coding opportunities       Chart reviewed, no opportunity found: CHART REVIEWED, NO OPPORTUNITY FOUND        Patients Insurance        Commercial Insurance: Olivier Calderon

## 2023-07-24 ENCOUNTER — OFFICE VISIT (OUTPATIENT)
Dept: FAMILY MEDICINE CLINIC | Facility: CLINIC | Age: 53
End: 2023-07-24

## 2023-07-24 VITALS
WEIGHT: 192 LBS | SYSTOLIC BLOOD PRESSURE: 118 MMHG | BODY MASS INDEX: 30.86 KG/M2 | HEIGHT: 66 IN | HEART RATE: 71 BPM | TEMPERATURE: 98.1 F | DIASTOLIC BLOOD PRESSURE: 63 MMHG

## 2023-07-24 DIAGNOSIS — R21 SKIN RASH: Primary | ICD-10-CM

## 2023-07-24 RX ORDER — HYDROXYZINE HYDROCHLORIDE 25 MG/1
25 TABLET, FILM COATED ORAL EVERY 6 HOURS PRN
Qty: 45 TABLET | Refills: 0 | Status: SHIPPED | OUTPATIENT
Start: 2023-07-24 | End: 2023-08-08

## 2023-07-24 RX ORDER — TRIAMCINOLONE ACETONIDE 1 MG/G
CREAM TOPICAL 2 TIMES DAILY
Qty: 30 G | Refills: 0 | Status: SHIPPED | OUTPATIENT
Start: 2023-07-24

## 2023-07-24 NOTE — PROGRESS NOTES
Name: Carli Quiros      : 1970      MRN: [id number]  Encounter Provider: Elmer Humphrey MD  Encounter Date: 2023   Encounter department: [address]     1. Skin rash  Assessment & Plan:  progressively worsening pruritic, hyperpigmented rash which started 2021 at her lower back and is now found in the intragluteal fold, lumbar back, axilla, below the breasts, and inguinal folds bilaterally. Given pruritus and large spread across body including beyond the body folds, this rash is less likely to be a fungal process like candidasis. The lack of a silver or white sheen  As well as location of rash makes psoriasis less likely. lack of a classic dry eczematous appearance. The appearance and distribution of hyperpigmentation is also suggestive of acanthosis nigracans associated with metabolic disease, meriting a workup in the setting of patient's recent weight gain. Consider tinea versicolor. PLAN:  - Kenalog cream 0.1% to be applied to small non areas of skin. Advised about hypopigmentation. If no improvement with kenalog cream will consider skin biopsy for further evaluation of rash. - Advised to use non scented soaps and creams.   - Atarax prn for itching; advised onside effects of drowsiness and to not use if driving or operating heavy machinery.    - Hgb A1c   - F/U 2 weeks to assess Kenalog use, metabolic findings, ongoing issues    Orders:  -     hydrOXYzine HCL (ATARAX) 25 mg tablet; Take 1 tablet (25 mg total) by mouth every 6 (six) hours as needed for itching for up to 15 days  -     triamcinolone (KENALOG) 0.1 % cream; Apply topically 2 (two) times a day  -     HEMOGLOBIN A1C W/ EAG ESTIMATION; Future         Subjective      Myesha Lemus presents to clinic due to concern for rash that has been getting progressively worse over the last two years. She states the rash started as a small itchy area on her lower back. Since then the rash has progressively spread to under her breasts, bilateral axilla, and up her back. She notes some discoloration in area of the rash and rash is associated with itching. Patient denies change in soap, lotions, or detergent. Patient states she has tried hydrocortisone cream, over the counter lotions, and vasiline for symptoms. She states Vaseline has helped some with itching. She states hydrocortisone cream was only used for 1 week and then d/c. She has never been evaluated for these symptoms in the past. Has history of seasonal allergies. Recently regaining some weight after stopping a shake diet. No recent changes in energy levels. She stopped going for walks due to right knee pain which started in September. She was last at PT in March. Right knee and left ankle have been giving her pain. Negative additional joint or bone pain. Review of Systems   Constitutional: Negative for activity change, fatigue and unexpected weight change. HENT: Negative for congestion, rhinorrhea and sore throat. Eyes: Negative for visual disturbance. Respiratory: Negative for cough and shortness of breath. Cardiovascular: Negative for chest pain. Gastrointestinal: Negative for abdominal pain and nausea. Genitourinary: Negative for dysuria, genital sores and vaginal discharge. Musculoskeletal: Positive for arthralgias. Negative for myalgias. Endorses ongoing right knee arthritis and left ankle pain   Skin: Positive for rash. Negative for wound. Allergic/Immunologic: Positive for environmental allergies. Neurological: Negative for dizziness and headaches.        Current Outpatient Medications on File Prior to Visit   Medication Sig   • levothyroxine 50 mcg tablet TAKE 1 TABLET BY MOUTH EVERY DAY   • Multiple Vitamin (MULTI-VITAMIN DAILY PO) Take by mouth       Objective     /63 (BP Location: Left arm, Patient Position: Sitting, Cuff Size: Large)   Pulse 71   Temp 98.1 °F (36.7 °C) (Temporal)   Ht 5' 6" (1.676 m)   Wt 87.1 kg (192 lb)   BMI 30.99 kg/m²     Physical Exam  Constitutional:       General: She is not in acute distress. Appearance: Normal appearance. She is not ill-appearing. HENT:      Head: Normocephalic and atraumatic. Cardiovascular:      Rate and Rhythm: Normal rate and regular rhythm. Pulses: Normal pulses. Heart sounds: Normal heart sounds. No murmur heard. Pulmonary:      Effort: Pulmonary effort is normal. No respiratory distress. Breath sounds: Normal breath sounds. Abdominal:      General: Bowel sounds are normal.      Palpations: Abdomen is soft. Tenderness: There is no abdominal tenderness. Musculoskeletal:         General: Normal range of motion. Cervical back: Normal range of motion. Right lower leg: No edema. Left lower leg: No edema. Skin:     General: Skin is warm. Findings: Rash present. Comments: Bilateral 5x5 cm patches of hyperpigmentation at the ampulla, 1x6 cm hyperpigmented striations below breasts bilaterally. Patches are without eruptions, scaling, discharge and appear moist.  Intragluteal hyperpigmentation and moisture which extends to a 15x15 cm patch of moist hyperpigmentation symmetrically across lumbar back. See media   Neurological:      General: No focal deficit present. Mental Status: She is alert and oriented to person, place, and time.    Psychiatric:         Mood and Affect: Mood normal.         Behavior: Behavior normal.       Tate Infante MD

## 2023-07-24 NOTE — ASSESSMENT & PLAN NOTE
progressively worsening pruritic, hyperpigmented rash which started December of 2021 at her lower back and is now found in the intragluteal fold, lumbar back, axilla, below the breasts, and inguinal folds bilaterally. Given pruritus and large spread across body including beyond the body folds, this rash is less likely to be a fungal process like candidasis. The lack of a silver or white sheen  As well as location of rash makes psoriasis less likely. lack of a classic dry eczematous appearance. The appearance and distribution of hyperpigmentation is also suggestive of acanthosis nigracans associated with metabolic disease, meriting a workup in the setting of patient's recent weight gain. Consider tinea versicolor. PLAN:  - Kenalog cream 0.1% to be applied to small non areas of skin. Advised about hypopigmentation. If no improvement with kenalog cream will consider skin biopsy for further evaluation of rash.    - Advised to use non scented soaps and creams.   - Atarax prn for itching; advised onside effects of drowsiness and to not use if driving or operating heavy machinery.    - Hgb A1c   - F/U 2 weeks to assess Kenalog use, metabolic findings, ongoing issues

## 2023-07-24 NOTE — PATIENT INSTRUCTIONS
- Use Aveeno, Aquaphor, or Cereva cream   - Use Kenalog cream in small area in the armpit or under breast   - Use atarax as needed for itching.  Please look out for side effects of drowsiness   - Go to lab and complete A1C

## 2023-07-26 ENCOUNTER — APPOINTMENT (OUTPATIENT)
Dept: LAB | Facility: HOSPITAL | Age: 53
End: 2023-07-26
Payer: COMMERCIAL

## 2023-07-26 ENCOUNTER — VBI (OUTPATIENT)
Dept: ADMINISTRATIVE | Facility: OTHER | Age: 53
End: 2023-07-26

## 2023-07-26 DIAGNOSIS — R21 SKIN RASH: ICD-10-CM

## 2023-07-26 LAB
EST. AVERAGE GLUCOSE BLD GHB EST-MCNC: 117 MG/DL
HBA1C MFR BLD: 5.7 %

## 2023-07-26 PROCEDURE — 36415 COLL VENOUS BLD VENIPUNCTURE: CPT

## 2023-07-26 PROCEDURE — 83036 HEMOGLOBIN GLYCOSYLATED A1C: CPT

## 2023-07-28 ENCOUNTER — TELEPHONE (OUTPATIENT)
Dept: FAMILY MEDICINE CLINIC | Facility: CLINIC | Age: 53
End: 2023-07-28

## 2023-07-28 NOTE — TELEPHONE ENCOUNTER
Called patient to discuss hgb A1C with patient. Hgb A1c 5.7, prediabetes ranged. Discussed lifestyle modification for now including diet changes and increasing exercise. Patient expresses understanding of this plan and is in agreement with plan.

## 2023-07-31 DIAGNOSIS — R21 SKIN RASH: ICD-10-CM

## 2023-08-01 RX ORDER — HYDROXYZINE HYDROCHLORIDE 25 MG/1
25 TABLET, FILM COATED ORAL EVERY 6 HOURS PRN
Qty: 270 TABLET | Refills: 1 | OUTPATIENT
Start: 2023-08-01 | End: 2023-08-16

## 2023-08-01 NOTE — TELEPHONE ENCOUNTER
I prescribed atarax for a short period of time (2 weeks) to see if that had any effect on her symptoms. Patient has appointment with provider on 8/8/23 at which point we can address the need for further medication. She was prescribed enough initially to last until follow up appointment and will address the need for refill at follow up.

## 2023-08-03 NOTE — PROGRESS NOTES
Name: Chelsea Ford      : 1970      MRN: [id number]  Encounter Provider: Irina Carmona MD  Encounter Date: 2023   Encounter department: [address] [facility] [address]     1. Skin rash  Assessment & Plan:  progressively worsening pruritic, hyperpigmented rash which started 2021 at her lower back and is now found in the intragluteal fold, lumbar back, axilla, below the breasts, and inguinal folds bilaterally. Given pruritus and large spread across body including beyond the body folds, this rash is less likely to be a fungal process like candidasis. The lack of a silver or white sheen  As well as location of rash makes psoriasis less likely. lack of a classic dry eczematous appearance. The appearance and distribution of hyperpigmentation is also suggestive of acanthosis nigracans associated with metabolic disease, meriting a workup in the setting of patient's recent weight gain. Consider tinea versicolor. PLAN:  - Kenalog cream 0.1% to be applied to small non areas of skin. Advised about hypopigmentation. If no improvement with kenalog cream will consider skin biopsy for further evaluation of rash. - Advised to use non scented soaps and creams.   - Atarax prn for itching; advised onside effects of drowsiness and to not use if driving or operating heavy machinery.    - Hgb A1c   - F/U 2 weeks to assess Kenalog use, metabolic findings, ongoing issues    Orders:  -     hydrOXYzine HCL (ATARAX) 25 mg tablet; Take 1 tablet (25 mg total) by mouth every 6 (six) hours as needed for itching for up to 15 days  -     triamcinolone (KENALOG) 0.1 % cream; Apply topically 2 (two) times a day  -     HEMOGLOBIN A1C W/ EAG ESTIMATION; Future         Subjective      Alonzo Page presents to clinic due to concern for rash that has been getting progressively worse over the last two years. She states the rash started as a small itchy area on her lower back. Since then the rash has progressively spread to under her breasts, bilateral axilla, and up her back. She notes some discoloration in area of the rash and rash is associated with itching. Patient denies change in soap, lotions, or detergent. Patient states she has tried hydrocortisone cream, over the counter lotions, and vasiline for symptoms. She states Vaseline has helped some with itching. She states hydrocortisone cream was only used for 1 week and then d/c. She has never been evaluated for these symptoms in the past. Has history of seasonal allergies. Recently regaining some weight after stopping a shake diet. No recent changes in energy levels. She stopped going for walks due to right knee pain which started in September. She was last at PT in March. Right knee and left ankle have been giving her pain. Negative additional joint or bone pain. Review of Systems   Constitutional: Negative for activity change, fatigue and unexpected weight change. HENT: Negative for congestion, rhinorrhea and sore throat. Eyes: Negative for visual disturbance. Respiratory: Negative for cough and shortness of breath. Cardiovascular: Negative for chest pain. Gastrointestinal: Negative for abdominal pain and nausea. Genitourinary: Negative for dysuria, genital sores and vaginal discharge. Musculoskeletal: Positive for arthralgias. Negative for myalgias. Endorses ongoing right knee arthritis and left ankle pain   Skin: Positive for rash. Negative for wound. Allergic/Immunologic: Positive for environmental allergies. Neurological: Negative for dizziness and headaches.        Current Outpatient Medications on File Prior to Visit   Medication Sig   • levothyroxine 50 mcg tablet TAKE 1 TABLET BY MOUTH EVERY DAY   • Multiple Vitamin (MULTI-VITAMIN DAILY PO) Take by mouth       Objective     /63 (BP Location: Left arm, Patient Position: Sitting, Cuff Size: Large)   Pulse 71   Temp 98.1 °F (36.7 °C) (Temporal)   Ht 5' 6" (1.676 m)   Wt 87.1 kg (192 lb)   BMI 30.99 kg/m²     Physical Exam  Constitutional:       General: She is not in acute distress. Appearance: Normal appearance. She is not ill-appearing. HENT:      Head: Normocephalic and atraumatic. Cardiovascular:      Rate and Rhythm: Normal rate and regular rhythm. Pulses: Normal pulses. Heart sounds: Normal heart sounds. No murmur heard. Pulmonary:      Effort: Pulmonary effort is normal. No respiratory distress. Breath sounds: Normal breath sounds. Abdominal:      General: Bowel sounds are normal.      Palpations: Abdomen is soft. Tenderness: There is no abdominal tenderness. Musculoskeletal:         General: Normal range of motion. Cervical back: Normal range of motion. Right lower leg: No edema. Left lower leg: No edema. Skin:     General: Skin is warm. Findings: Rash present. Comments: Bilateral 5x5 cm patches of hyperpigmentation at the ampulla, 1x6 cm hyperpigmented striations below breasts bilaterally. Patches are without eruptions, scaling, discharge and appear moist.  Intragluteal hyperpigmentation and moisture which extends to a 15x15 cm patch of moist hyperpigmentation symmetrically across lumbar back. See media   Neurological:      General: No focal deficit present. Mental Status: She is alert and oriented to person, place, and time.    Psychiatric:         Mood and Affect: Mood normal.         Behavior: Behavior normal.       Tate Infante MD

## 2023-08-08 ENCOUNTER — OFFICE VISIT (OUTPATIENT)
Dept: FAMILY MEDICINE CLINIC | Facility: CLINIC | Age: 53
End: 2023-08-08

## 2023-08-08 VITALS
DIASTOLIC BLOOD PRESSURE: 85 MMHG | HEART RATE: 80 BPM | SYSTOLIC BLOOD PRESSURE: 141 MMHG | RESPIRATION RATE: 18 BRPM | TEMPERATURE: 98 F

## 2023-08-08 DIAGNOSIS — R21 SKIN RASH: Primary | ICD-10-CM

## 2023-08-08 PROCEDURE — 99213 OFFICE O/P EST LOW 20 MIN: CPT | Performed by: FAMILY MEDICINE

## 2023-08-08 NOTE — PROGRESS NOTES
Name: Yovana Haywood      : 1970      MRN: [id number]  Encounter Provider: Merle Castanon DO  Encounter Date: 2023   Encounter department: [address]     1. Skin rash  Assessment & Plan:  -improving; almost completely resolved   -continue cera ve moisturizer  -Pt reports she did not realize this was a family medicine office and thought this was a dermatology office; provided referral to dermatology for future needs     Orders:  -     Ambulatory Referral to Dermatology; Future      Depression Screening and Follow-up Plan: Patient was screened for depression during today's encounter. They screened negative with a PHQ-2 score of 0. Subjective      Here for follow up visit for rash. Was seen on  for rash under breasts, axilla, and back. Was prescribed steroid cream, antihistamine at that visit with minimal improvement. States she has been using Cerva Ve moisturizer which has helped the most. Rash is improved. Review of Systems   Constitutional: Negative for chills and fever. HENT: Negative for ear pain and sore throat. Eyes: Negative for pain and visual disturbance. Respiratory: Negative for cough and shortness of breath. Cardiovascular: Negative for chest pain and palpitations. Gastrointestinal: Negative for abdominal pain and vomiting. Genitourinary: Negative for dysuria and hematuria. Musculoskeletal: Negative for arthralgias and back pain. Skin: Negative for color change and rash. Neurological: Negative for seizures and syncope. All other systems reviewed and are negative.       Current Outpatient Medications on File Prior to Visit   Medication Sig   • levothyroxine 50 mcg tablet TAKE 1 TABLET BY MOUTH EVERY DAY   • Multiple Vitamin (MULTI-VITAMIN DAILY PO) Take by mouth   • hydrOXYzine HCL (ATARAX) 25 mg tablet Take 1 tablet (25 mg total) by mouth every 6 (six) hours as needed for itching for up to 15 days (Patient not taking: Reported on 8/8/2023)   • triamcinolone (KENALOG) 0.1 % cream Apply topically 2 (two) times a day (Patient not taking: Reported on 8/8/2023)       Objective     /85   Pulse 80   Temp 98 °F (36.7 °C)   Resp 18     Physical Exam  Constitutional:       General: She is not in acute distress. Appearance: Normal appearance. She is not ill-appearing or toxic-appearing. HENT:      Head: Normocephalic and atraumatic. Right Ear: External ear normal.      Left Ear: External ear normal.      Nose: Nose normal.      Mouth/Throat:      Mouth: Mucous membranes are moist.      Pharynx: Oropharynx is clear. Eyes:      General: No scleral icterus. Conjunctiva/sclera: Conjunctivae normal.   Cardiovascular:      Rate and Rhythm: Normal rate and regular rhythm. Heart sounds: Normal heart sounds. No murmur heard. Pulmonary:      Effort: Pulmonary effort is normal.      Breath sounds: Normal breath sounds. No wheezing, rhonchi or rales. Musculoskeletal:         General: Normal range of motion. Cervical back: Neck supple. Skin:     General: Skin is warm and dry. Coloration: Skin is not jaundiced. Findings: Rash (area of faint hyperpigmentation on lower back. No erythema, scaling, or crusting appreciated. ) present. Neurological:      General: No focal deficit present. Mental Status: She is alert and oriented to person, place, and time.    Psychiatric:         Mood and Affect: Mood normal.         Behavior: Behavior normal.       Georgiana Bhagat DO

## 2023-08-08 NOTE — ASSESSMENT & PLAN NOTE
-improving; almost completely resolved   -continue cera ve moisturizer  -Pt reports she did not realize this was a family medicine office and thought this was a dermatology office; provided referral to dermatology for future needs

## 2023-09-20 ENCOUNTER — PREP FOR PROCEDURE (OUTPATIENT)
Age: 53
End: 2023-09-20

## 2023-09-20 ENCOUNTER — TELEPHONE (OUTPATIENT)
Age: 53
End: 2023-09-20

## 2023-09-20 DIAGNOSIS — Z80.0 FAMILY HISTORY OF COLON CANCER: Primary | ICD-10-CM

## 2023-09-20 NOTE — TELEPHONE ENCOUNTER
09/20/23  Screened by: Cricket Treadwell    Referring Provider white    Pre- Screening: There is no height or weight on file to calculate BMI. 30.99    Has patient been referred for a routine screening Colonoscopy? yes  Is the patient between 43-73 years old? yes      Previous Colonoscopy yes   If yes:    Date:  6 yrs ago     Facility:     Reason:       SCHEDULING STAFF: If the patient is between 45yrs-49yrs, please advise patient to confirm benefits/coverage with their insurance company for a routine screening colonoscopy, some insurance carriers will only cover at [address] [place] [address] or older. If the patient is over 66years old, please schedule an office visit. Does the patient want to see a Gastroenterologist prior to their procedure OR are they having any GI symptoms? no    Has the patient been hospitalized or had abdominal surgery in the past 6 months? no    Does the patient use supplemental oxygen? no    Does the patient take Coumadin, Lovenox, Plavix, Elliquis, Xarelto, or other blood thinning medication? no    Has the patient had a stroke, cardiac event, or stent placed in the past year? no    PT PASS OA   SCHEDULING STAFF: If patient answers NO to above questions, then schedule procedure. If patient answers YES to above questions, then schedule office appointment. If patient is between 45yrs - 49yrs, please advise patient that we will have to confirm benefits & coverage with their insurance company for a routine screening colonoscopy.

## 2023-09-20 NOTE — TELEPHONE ENCOUNTER
Scheduled date of colonoscopy (as of today): 11/3    Physician performing colonoscopy: Louie Stark     Location of colonoscopy: [facility]     Bowel prep reviewed with patient: diana/palomo    Instructions reviewed with patient by: sent via portal     Clearances: None

## 2023-10-20 ENCOUNTER — ANESTHESIA EVENT (OUTPATIENT)
Dept: ANESTHESIOLOGY | Facility: AMBULATORY SURGERY CENTER | Age: 53
End: 2023-10-20

## 2023-10-20 ENCOUNTER — ANESTHESIA (OUTPATIENT)
Dept: ANESTHESIOLOGY | Facility: AMBULATORY SURGERY CENTER | Age: 53
End: 2023-10-20

## 2023-10-31 ENCOUNTER — TELEPHONE (OUTPATIENT)
Dept: GASTROENTEROLOGY | Facility: CLINIC | Age: 53
End: 2023-10-31

## 2023-10-31 NOTE — TELEPHONE ENCOUNTER
lmom confirming pt's colonoscopy scheduled on 11/3/23 at [facility] with Dr Pat Calix.  Informed [facility] would be calling 1-2 days with the arrival time. Informed of clear liquid diet day prior as well as the bowel cleansing preparation. Informed would need a  the day of the procedure due to being under sedation. I asked pt to please call back if has not received instructions or if has any questions.

## 2023-11-03 ENCOUNTER — ANESTHESIA EVENT (OUTPATIENT)
Dept: GASTROENTEROLOGY | Facility: AMBULATORY SURGERY CENTER | Age: 53
End: 2023-11-03

## 2023-11-03 ENCOUNTER — HOSPITAL ENCOUNTER (OUTPATIENT)
Dept: GASTROENTEROLOGY | Facility: AMBULATORY SURGERY CENTER | Age: 53
Discharge: HOME | End: 2023-11-03
Payer: COMMERCIAL

## 2023-11-03 ENCOUNTER — ANESTHESIA (OUTPATIENT)
Dept: GASTROENTEROLOGY | Facility: AMBULATORY SURGERY CENTER | Age: 53
End: 2023-11-03

## 2023-11-03 VITALS
OXYGEN SATURATION: 100 % | RESPIRATION RATE: 18 BRPM | BODY MASS INDEX: 30.86 KG/M2 | TEMPERATURE: 96.4 F | WEIGHT: 192 LBS | HEIGHT: 66 IN | SYSTOLIC BLOOD PRESSURE: 125 MMHG | DIASTOLIC BLOOD PRESSURE: 87 MMHG | HEART RATE: 89 BPM

## 2023-11-03 DIAGNOSIS — Z80.0 FAMILY HISTORY OF COLON CANCER: ICD-10-CM

## 2023-11-03 PROCEDURE — G0105 COLORECTAL SCRN; HI RISK IND: HCPCS | Performed by: INTERNAL MEDICINE

## 2023-11-03 RX ORDER — PROPOFOL 10 MG/ML
INJECTION, EMULSION INTRAVENOUS AS NEEDED
Status: DISCONTINUED | OUTPATIENT
Start: 2023-11-03 | End: 2023-11-03

## 2023-11-03 RX ORDER — SODIUM CHLORIDE, SODIUM LACTATE, POTASSIUM CHLORIDE, CALCIUM CHLORIDE 600; 310; 30; 20 MG/100ML; MG/100ML; MG/100ML; MG/100ML
20 INJECTION, SOLUTION INTRAVENOUS CONTINUOUS
Status: DISCONTINUED | OUTPATIENT
Start: 2023-11-03 | End: 2023-11-07 | Stop reason: HOSPADM

## 2023-11-03 RX ORDER — SODIUM CHLORIDE, SODIUM LACTATE, POTASSIUM CHLORIDE, CALCIUM CHLORIDE 600; 310; 30; 20 MG/100ML; MG/100ML; MG/100ML; MG/100ML
INJECTION, SOLUTION INTRAVENOUS CONTINUOUS PRN
Status: DISCONTINUED | OUTPATIENT
Start: 2023-11-03 | End: 2023-11-03

## 2023-11-03 RX ADMIN — PROPOFOL 50 MG: 10 INJECTION, EMULSION INTRAVENOUS at 12:14

## 2023-11-03 RX ADMIN — SODIUM CHLORIDE, SODIUM LACTATE, POTASSIUM CHLORIDE, CALCIUM CHLORIDE: 600; 310; 30; 20 INJECTION, SOLUTION INTRAVENOUS at 11:57

## 2023-11-03 RX ADMIN — PROPOFOL 30 MG: 10 INJECTION, EMULSION INTRAVENOUS at 12:09

## 2023-11-03 RX ADMIN — PROPOFOL 100 MG: 10 INJECTION, EMULSION INTRAVENOUS at 12:01

## 2023-11-03 RX ADMIN — PROPOFOL 30 MG: 10 INJECTION, EMULSION INTRAVENOUS at 12:12

## 2023-11-03 RX ADMIN — PROPOFOL 40 MG: 10 INJECTION, EMULSION INTRAVENOUS at 12:03

## 2023-11-03 RX ADMIN — PROPOFOL 50 MG: 10 INJECTION, EMULSION INTRAVENOUS at 12:05

## 2023-11-03 NOTE — ANESTHESIA PREPROCEDURE EVALUATION
Procedure:  COLONOSCOPY    Relevant Problems   ENDO   (+) Acquired hypothyroidism      MUSCULOSKELETAL   (+) Patellofemoral arthritis of right knee      PULMONARY   (+) Moderate persistent asthma with acute exacerbation      Asthma, well controlled. No recent exacerbation. PRN albuterol. Physical Exam    Airway    Mallampati score: I  TM Distance: >3 FB  Neck ROM: full     Dental   No notable dental hx     Cardiovascular      Pulmonary      Other Findings        Anesthesia Plan  ASA Score- 2     Anesthesia Type- IV sedation with anesthesia with ASA Monitors. Additional Monitors:     Airway Plan:            Plan Factors-Exercise tolerance (METS): >4 METS. Chart reviewed. Patient summary reviewed. Patient is not a current smoker. Obstructive sleep apnea risk education given perioperatively. Induction- intravenous. Postoperative Plan-     Informed Consent- Anesthetic plan and risks discussed with patient. I personally reviewed this patient with the CRNA. Discussed and agreed on the Anesthesia Plan with the CRNA. Darylene Pipe

## 2023-11-03 NOTE — H&P
History and Physical -  [facility]  Casey Valentine 46 y.o. female MRN: [id number]                  HPI: Casey Valentine is a 46y.o. year old female who presents for history of polyps      REVIEW OF SYSTEMS: Per the HPI, and otherwise unremarkable. Historical Information   Past Medical History:   Diagnosis Date    Abnormal Pap smear of cervix     Anemia     Arthritis     Asthma     Colon polyp     History of vertigo     Hyperlipemia     triglycerides    Hypertriglyceridemia     Hypothyroidism     Impaired fasting glucose     Insomnia     Lab test positive for detection of COVID-19 virus 12/22/2021    Premenstrual tension syndrome     Tendonitis      Past Surgical History:   Procedure Laterality Date    COLONOSCOPY  2016    MAMMO (HISTORICAL) Bilateral 11/03/2020    WISDOM TOOTH EXTRACTION       Social History   Social History     Substance and Sexual Activity   Alcohol Use Yes    Comment: social      Social History     Substance and Sexual Activity   Drug Use Never     Social History     Tobacco Use   Smoking Status Never   Smokeless Tobacco Never     Family History   Problem Relation Age of Onset    Colon cancer Mother 67    Diabetes Mother     Hypertension Mother     Heart disease Father     Hyperlipidemia Father     Heart attack Father     Heart failure Father     Alzheimer's disease Maternal Aunt     No Known Problems Son     No Known Problems Sister     Breast cancer Maternal Grandmother         52's ?     No Known Problems Maternal Grandfather     Brain cancer Paternal Grandmother         young age    No Known Problems Paternal Grandfather     No Known Problems Paternal Uncle        Meds/Allergies       Current Outpatient Medications:     levothyroxine 50 mcg tablet    Multiple Vitamin (MULTI-VITAMIN DAILY PO)    ALBUTEROL IN    hydrOXYzine HCL (ATARAX) 25 mg tablet    triamcinolone (KENALOG) 0.1 % cream    Allergies   Allergen Reactions    Niacin Hives     In high doses   (Flushing) Objective     /88   Pulse 82   Temp (!) 96.4 °F (35.8 °C) (Temporal)   Resp 18   Ht 5' 6" (1.676 m)   Wt 87.1 kg (192 lb)   LMP 09/12/2022   SpO2 98%   BMI 30.99 kg/m²       PHYSICAL EXAM    Gen: NAD  Head: NCAT  CV: RRR  CHEST: Clear  ABD: soft, NT/ND  EXT: no edema      ASSESSMENT/PLAN:  This is a 46y.o. year old female here for colonoscopy, and she is stable and optimized for her procedure.

## 2023-11-07 ENCOUNTER — ANNUAL EXAM (OUTPATIENT)
Dept: OBGYN CLINIC | Facility: CLINIC | Age: 53
End: 2023-11-07

## 2023-11-07 VITALS
BODY MASS INDEX: 31.34 KG/M2 | SYSTOLIC BLOOD PRESSURE: 128 MMHG | HEIGHT: 66 IN | WEIGHT: 195 LBS | DIASTOLIC BLOOD PRESSURE: 82 MMHG

## 2023-11-07 DIAGNOSIS — L90.0 LICHEN SCLEROSUS: ICD-10-CM

## 2023-11-07 DIAGNOSIS — Z01.419 ENCOUNTER FOR GYNECOLOGICAL EXAMINATION (GENERAL) (ROUTINE) WITHOUT ABNORMAL FINDINGS: Primary | ICD-10-CM

## 2023-11-07 NOTE — PROGRESS NOTES
ASSESSMENT & PLAN: Jaiem Hector is a 46 y.o. Shukri Covington with normal gynecologic exam.    1.  Routine well woman exam done today  2. Pap and HPV:  The patient's last pap and hpv was 2020 and 2022. It was normal.    Pap and cotesting was not done today. Current ASCCP Guidelines reviewed. 3.  Mammogram ordered by PCP and scheduled for the end of this month  4. Colonoscopy screening up-to-date from November 2023 and due again in 2028.  5. The following were reviewed in today's visit: breast self exam, menopause, adequate intake of calcium and vitamin D, exercise, healthy diet, and age-appropriate recommendation regarding screenings and prevention  6. Pre-existing history of lichen sclerosis, patient reports rare episode of intermittent mild itching relieved by OTC hydrocortisone. No progression of symptoms or new symptoms to speak of. Can continue topical hydrocortisone as needed. Stable findings today on exam.    RTO 1 year for annual, sooner problems arise in the interim. CC:  Annual Gynecologic Examination    HPI: Jaime Hector is a 46 y.o. Shukri Covington who presents for annual gynecologic examination. She has the following concerns:  none. She has hypothyroidism - still taking levothyroxine. Healthy diet Yes  Vitamins Yes; MVI  Exercise Yes; almost daily - walking cardio, yoga. Patient's last menstrual period was 09/12/2022. No periods or bleeding since then. Post-menopausal bleeding: none. Bowel or bladder changes: denies urinary sxs, incontinence or hematuria. Bowel changes managed by gastro. Health Maintenance:      She does perform irregular monthly self breast exams. Denies breast pain, lump, skin change or nipple discharge. She feels safe at home. Feels safe with .      Last Pap: 11/2022  Last mammogram: overdue  Last colonoscopy: 11/2022 - due again in 5 yrs    Past Medical History:   Diagnosis Date    Abnormal Pap smear of cervix     Anemia Arthritis     Asthma     Colon polyp     History of vertigo     Hyperlipemia     triglycerides    Hypertriglyceridemia     Hypothyroidism     Impaired fasting glucose     Insomnia     Lab test positive for detection of COVID-19 virus 2021    Premenstrual tension syndrome     Tendonitis        Past Surgical History:   Procedure Laterality Date    COLONOSCOPY  2016    COLONOSCOPY  2023    MAMMO (HISTORICAL) Bilateral 2020    WISDOM TOOTH EXTRACTION         Past OB/Gyn History:  OB History          1    Para   1    Term   1       0    AB   0    Living   1         SAB   0    IAB   0    Ectopic   0    Multiple        Live Births   1               History of abnormal pap smears: denies . Patient is currently sexually active. monogamous with   The current method of family planning is post menopausal status. Family History   Problem Relation Age of Onset    Colon cancer Mother 67    Diabetes Mother     Hypertension Mother     Heart disease Father     Hyperlipidemia Father     Heart attack Father     Heart failure Father     Alzheimer's disease Maternal Aunt     No Known Problems Son     No Known Problems Sister     Breast cancer Maternal Grandmother         52's ?     No Known Problems Maternal Grandfather     Brain cancer Paternal Grandmother         young age    No Known Problems Paternal Grandfather     No Known Problems Paternal Uncle        Family history of Breast/Uterine/Ovarian/Colon Cancer: as above    Social History:  Social History     Socioeconomic History    Marital status: /Civil Union     Spouse name: Not on file    Number of children: Not on file    Years of education: 4 year college    Highest education level: Not on file   Occupational History    Occupation:    Tobacco Use    Smoking status: Never    Smokeless tobacco: Never   Vaping Use    Vaping Use: Never used   Substance and Sexual Activity    Alcohol use: Yes     Comment: social     Drug use: Never    Sexual activity: Yes     Partners: Male     Birth control/protection: Male Sterilization     Comment: Partner vasectomy   Other Topics Concern    Not on file   Social History Narrative    · Most recent tobacco use screenin2018      · Do you currently or have you served in the [address] [place] [address]:   No      · Were you activated, into active duty, as a member of the [employer] or as a Reservist:   No      · General stress level:   High      · Exercise level: Moderate      · Diet:   Regular      · Marital status:     since      · Sexual orientation:   Heterosexual      · Alcohol intake:   Occasional      · Live alone or with others:   with others      · Caffeine intake:   None      · Do you feel safe at home:   Yes      Social Determinants of Health     Financial Resource Strain: Low Risk  (2023)    Overall Financial Resource Strain (CARDIA)     Difficulty of Paying Living Expenses: Not hard at all   Food Insecurity: No Food Insecurity (2023)    Hunger Vital Sign     Worried About Running Out of Food in the Last Year: Never true     Ran Out of Food in the Last Year: Never true   Transportation Needs: No Transportation Needs (2023)    PRAPARE - Transportation     Lack of Transportation (Medical): No     Lack of Transportation (Non-Medical):  No   Physical Activity: Sufficiently Active (2023)    Exercise Vital Sign     Days of Exercise per Week: 5 days     Minutes of Exercise per Session: 30 min   Stress: No Stress Concern Present (2023)    [facility]     Feeling of Stress : Not at all   Social Connections: Not on file   Intimate Partner Violence: Not At Risk (2023)    Humiliation, Afraid, Rape, and Kick questionnaire     Fear of Current or Ex-Partner: No     Emotionally Abused: No     Physically Abused: No     Sexually Abused: No   Housing Stability: Low Risk  (2023)    Housing Stability Vital Sign     Unable to Pay for Housing in the Last Year: No     Number of Places Lived in the Last Year: 1     Unstable Housing in the Last Year: No       Allergies   Allergen Reactions    Niacin Hives     In high doses   (Flushing)       Current Outpatient Medications:     ALBUTEROL IN, Inhale if needed, Disp: , Rfl:     levothyroxine 50 mcg tablet, TAKE 1 TABLET BY MOUTH EVERY DAY, Disp: 90 tablet, Rfl: 3    Multiple Vitamin (MULTI-VITAMIN DAILY PO), Take by mouth in the morning, Disp: , Rfl:   No current facility-administered medications for this visit. Review of Systems  Constitutional :no fever, feels well, no tiredness, no recent weight gain or loss  ENT: no ear ache, no loss of hearing, no nosebleeds or nasal discharge, no sore throat or hoarseness. Cardiovascular: no complaints of slow or fast heart beat, no chest pain, no palpitations, no leg claudication or lower extremity edema. Respiratory: no complaints of shortness of shortness of breath, no KING  Breasts:no complaints of breast pain, breast lump, or nipple discharge  Gastrointestinal: no complaints of abdominal pain, constipation, nausea, vomiting, or diarrhea or bloody stools  Genitourinary : Pre-existing lichen sclerosis in superior vulvar area without any significant symptoms aside from rare occasion of itch relieved with OTC hydrocortisone cream.  No complaints of dysuria, incontinence, pelvic pain,  vaginal discharge/itching/odor/dryness/dyspareunia or abnormal vaginal bleeding and as noted in HPI. Musculoskeletal: Chronic knee pain related to arthritis; no new complaints of arthralgia, no myalgia, no joint swelling or stiffness, no limb pain or swelling. Integumentary: no complaints of skin rash or lesion, itching or dry skin  Neurological: no complaints of headache, no confusion, no numbness or tingling, no dizziness or fainting  Mental health: Denies anxiety or depression symptoms.     Objective      /82 (BP Location: Left arm, Patient Position: Sitting, Cuff Size: Adult)   Ht 5' 6" (1.676 m)   Wt 88.5 kg (195 lb)   LMP 09/12/2022   BMI 31.47 kg/m²     General:   appears stated age, cooperative, alert normal mood and affect. BMI 31.47   Neck: normal, supple,trachea midline, no masses. Thyroid palpated normal.   Heart: regular rate and rhythm, S1, S2 normal, no murmur, click, rub or gallop   Lungs: clear to auscultation bilaterally   Breasts: normal appearance, no masses or tenderness, No nipple retraction or dimpling, No nipple discharge or bleeding, No axillary or supraclavicular adenopathy, Normal to palpation without dominant masses   Abdomen: soft, non-tender, without masses or organomegaly   Vulva: Generalized slightly lighter colored skin and superior vulva around clitoral mesa and surrounding area bilateral/symmetrical appearing stable without any new discrete white patch, lesion, area of redness, scaling or swelling. Vagina: normal vagina, no discharge, exudate, lesion, or erythema   Urethra: normal   Cervix: Normal, no discharge. Uterus: normal   Adnexa: no mass, fullness, tenderness   Lymphatic palpation of lymph nodes in neck, axilla, groin and/or other locations: no lymphadenopathy or masses noted   Skin normal skin turgor and no rashes.    Psychiatric orientation to person, place, and time: normal. mood and affect: normal

## 2023-11-27 ENCOUNTER — OFFICE VISIT (OUTPATIENT)
Dept: NEUROSURGERY | Facility: CLINIC | Age: 53
End: 2023-11-27
Payer: COMMERCIAL

## 2023-11-27 VITALS
BODY MASS INDEX: 30.37 KG/M2 | HEART RATE: 85 BPM | SYSTOLIC BLOOD PRESSURE: 128 MMHG | RESPIRATION RATE: 16 BRPM | OXYGEN SATURATION: 99 % | HEIGHT: 66 IN | WEIGHT: 189 LBS | TEMPERATURE: 98.5 F | DIASTOLIC BLOOD PRESSURE: 80 MMHG

## 2023-11-27 DIAGNOSIS — I67.1 ANEURYSM OF ANTERIOR COMMUNICATING ARTERY: Primary | ICD-10-CM

## 2023-11-27 PROCEDURE — 99244 OFF/OP CNSLTJ NEW/EST MOD 40: CPT | Performed by: RADIOLOGY

## 2023-11-27 RX ORDER — SODIUM CHLORIDE 9 MG/ML
75 INJECTION, SOLUTION INTRAVENOUS CONTINUOUS
OUTPATIENT
Start: 2023-11-27

## 2023-11-27 NOTE — PROGRESS NOTES
Neurosurgery Office Note  Olga Iglesias 46 y.o. female MRN: [id number]      Assessment/Plan     Aneurysm of anterior communicating artery  Pt presents for consultation for approx 5 mm Anterior communicating artery aneurysm. Initially discovered on MRA head during workup for dizziness. Karlyn Eisenmenger in February 2023. Imaging reviewed personally and by attending. Final results below discussed with the patient. MRA head wo 2/15/23: 5 mm anterior communicating artery aneurysm. Plan  Discussed with patient the natural history of cerebral aneurysms. Discussed management, monitoring and avoidance of risk factors for aneurysm growth and rupture such as hyperlipidemia, HTN and avoidance of smoking. Certain genetic conditions are associated with the incidence of cerebral aneurysms. Discussed with patient to relay to their first degree family members, that they should be screened by their Primary Care Providers for cerebral aneurysms with MRA head after age 28. If no aneurysm is noted on initial imaging, surveillance should continue once every decade till about age 79. Discussed with pt sx of cerebral aneurysmal rupture and recommended that patient call 911 and present to Emergency Room if  they ever experience sudden severe  headache, seizure, mental status change, speech / vision change, sensory / motor change, or other neurological change. Dr. Fox Pritchett discussed with patient further evaluation of her Acomm artery aneurysm with cerebral angiography. He discussed with pt the procedure in detail, including the risks and benefits to the procedure. Pt asked relevant questions which were answered to her satisfaction. Patient provided consent for the procedure and asked that we proceed with arrangements for the procedure. Pt is aware she will have preop clearance for the procedure including lab work and medical clearance.  Pt will be contacted by the neurosurgery surgical coordinator to schedule the cerebral angiography and for medical clearance. Patient made aware to contact neurosurgery with any questions or concerns in the interim. Diagnoses and all orders for this visit:    Aneurysm of anterior communicating artery    Other orders  -     Zolpidem Tartrate (AMBIEN PO); Take by mouth        I have spent a total time of 45 minutes on 11/27/23 in caring for this patient including Diagnostic results, Risks and benefits of tx options, Instructions for management, Patient and family education, Risk factor reductions, Impressions, Counseling / Coordination of care, Documenting in the medical record, Reviewing / ordering tests, medicine, procedures  , Obtaining or reviewing history  , and Communicating with other healthcare professionals . CHIEF COMPLAINT    Chief Complaint   Patient presents with    Consult     Aneurysm       HISTORY    History of Present Illness     46y.o. year old female     With PMHx of hypothyroidism, migraines, elevated BP in the office without diagnosis of HTN who presents to the neurosurgery office for consultation for approximately 5 mm Acomm artery aneurysm that was initially noted on MRA head during workup for dizziness/vertigo. Pt reports that she intermittently gets dizziness. She reports that in October 2023 she had an episode of left sided headache/facial pain that lasted for a short times and resolved. She denies recurrence of this headache or pain. Pt reports intermittent tinnitus in her left ear. Today patient denies any visual disturbance. Patient denies any new numbness, tingling or weakness. Pt denies changes in gait or balance. Pt denies nausea or vomiting. Pt is . She had a son aged 32 years. Pt reports a family hx of aneurysms. She reports her paternal grandfather passed away from cerebral aneurysm rupture. She works as an . She reports her father has an abdominal aneurysm. Pt has never smoked cigarettes.            REVIEW OF SYSTEMS    Review of Systems   HENT:  Positive for tinnitus (left ear, over 1 year). Eyes:  Negative for pain and visual disturbance. Respiratory:  Negative for chest tightness and shortness of breath. Gastrointestinal: Negative. Genitourinary: Negative. Musculoskeletal:  Positive for arthralgias (right knee). Skin:  Positive for rash. Neurological:  Positive for dizziness. Negative for tremors, seizures, speech difficulty, weakness, numbness and headaches. Psychiatric/Behavioral:  Positive for decreased concentration and sleep disturbance. Negative for confusion. The patient is nervous/anxious. Some memory difficulty       ROS obtained by MA. Reviewed. See HPI. Meds/Allergies     Current Outpatient Medications   Medication Sig Dispense Refill    ALBUTEROL IN Inhale if needed      levothyroxine 50 mcg tablet TAKE 1 TABLET BY MOUTH EVERY DAY 90 tablet 3    Multiple Vitamin (MULTI-VITAMIN DAILY PO) Take by mouth in the morning      Zolpidem Tartrate (AMBIEN PO) Take by mouth       No current facility-administered medications for this visit.        Allergies   Allergen Reactions    Niacin Hives     In high doses   (Flushing)    Mucinex Fast-Max Chest Scott Ms [Guaifenesin] Palpitations       PAST HISTORY    Past Medical History:   Diagnosis Date    Abnormal Pap smear of cervix     Anemia     Arthritis     Asthma     Colon polyp     History of vertigo     Hyperlipemia     triglycerides    Hypertriglyceridemia     Hypothyroidism     Impaired fasting glucose     Insomnia     Lab test positive for detection of COVID-19 virus 12/22/2021    Premenstrual tension syndrome     Tendonitis        Past Surgical History:   Procedure Laterality Date    COLONOSCOPY  2016    COLONOSCOPY  11/04/2023    MAMMO (HISTORICAL) Bilateral 11/03/2020    WISDOM TOOTH EXTRACTION         Social History     Tobacco Use    Smoking status: Never    Smokeless tobacco: Never   Vaping Use    Vaping Use: Never used Substance Use Topics    Alcohol use: Yes     Comment: social     Drug use: Never       Family History   Problem Relation Age of Onset    Colon cancer Mother 67    Diabetes Mother     Hypertension Mother     Heart disease Father     Hyperlipidemia Father     Heart attack Father     Heart failure Father     Alzheimer's disease Maternal Aunt     No Known Problems Son     No Known Problems Sister     Breast cancer Maternal Grandmother         52's ? No Known Problems Maternal Grandfather     Brain cancer Paternal Grandmother         young age    No Known Problems Paternal Grandfather     No Known Problems Paternal Uncle          Above history personally reviewed. EXAM    Vitals:Blood pressure 128/80, pulse 85, temperature 98.5 °F (36.9 °C), temperature source Temporal, resp. rate 16, height 5' 6" (1.676 m), weight 85.7 kg (189 lb), last menstrual period 09/12/2022, SpO2 99 %. ,Body mass index is 30.51 kg/m². Physical Exam  Constitutional:       General: She is not in acute distress. Appearance: She is well-developed. HENT:      Head: Normocephalic and atraumatic. Eyes:      Extraocular Movements: Extraocular movements intact. Pupils: Pupils are equal, round, and reactive to light. Neck:      Trachea: No tracheal deviation. Cardiovascular:      Rate and Rhythm: Normal rate. Pulmonary:      Effort: Pulmonary effort is normal.   Abdominal:      Palpations: Abdomen is soft. Tenderness: There is no abdominal tenderness. There is no guarding. Musculoskeletal:      Cervical back: Normal range of motion and neck supple. Skin:     General: Skin is warm and dry. Coloration: Skin is not pale. Neurological:      Mental Status: She is alert and oriented to person, place, and time.       Comments: GCS 15, Awake, Alert, Oriented x 3    Motor: OLIVAS, strength 5/5 throughout    Sensation:  intact to LT X 4     Reflexes: 2+ and symmetric, no kurtz's or clonus     Coordination: no drift bilateral upper extremities. Psychiatric:         Mood and Affect: Mood is anxious. Behavior: Behavior normal.         Neurologic Exam     Mental Status   Oriented to person, place, and time. Cranial Nerves     CN III, IV, VI   Pupils are equal, round, and reactive to light. MEDICAL DECISION MAKING    Imaging Studies:     Colonoscopy    Result Date: 11/3/2023  Narrative: Table formatting from the original result was not included. [facility] [address] [place] [id number] [phone] DATE OF SERVICE: 11/03/23 PHYSICIAN(S): Attending: Stephanie Cancino MD Fellow: No Staff Documented INDICATION: Family history of colon cancer POST-OP DIAGNOSIS: See the impression below. HISTORY: Prior colonoscopy: 6 years ago. BOWEL PREPARATION: Miralax/Dulcolax PREPROCEDURE: Informed consent was obtained for the procedure, including sedation. Risks including but not limited to bleeding, infection, perforation, adverse drug reaction and aspiration were explained in detail. Also explained about less than 100% sensitivity with the exam and other alternatives. The patient was placed in the left lateral decubitus position. Procedure: Colonoscopy DETAILS OF PROCEDURE: Patient was taken to the procedure room where a time out was performed to confirm correct patient and correct procedure. The patient underwent monitored anesthesia care, which was administered by an anesthesia professional. The patient's blood pressure, heart rate, level of consciousness, oxygen, respirations and ECG were monitored throughout the procedure. A digital rectal exam was performed. The scope was introduced through the anus and advanced to the cecum. Retroflexion was performed in the rectum. The quality of bowel preparation was evaluated using the [facility] Bowel Preparation Scale with scores of: right colon = 2, transverse colon = 2, left colon = 2. The total BBPS score was 6. Bowel prep was adequate. The patient experienced no blood loss. The procedure was not difficult. The patient tolerated the procedure well. There were no apparent adverse events. ANESTHESIA INFORMATION: ASA: II Anesthesia Type: IV Sedation with Anesthesia MEDICATIONS: No administrations occurring from 1158 to 1218 on 11/03/23 FINDINGS: Few small diverticula of mild severity in the sigmoid colon Internal small (grade 1) hemorrhoids EVENTS: Procedure Events Event Event Time ENDO CECUM REACHED 11/3/2023 12:06 PM ENDO SCOPE OUT TIME 11/3/2023 12:15 PM SPECIMENS: * No specimens in log * EQUIPMENT: Colonoscope -[id number]     Impression: Diverticulosis of mild severity in the sigmoid colon Small (grade 1) hemorrhoids RECOMMENDATION:  Repeat colonoscopy in 5 years  Family history of colon cancer    Zachary Flynn MD       I have personally reviewed pertinent reports.    and I have personally reviewed pertinent films in PACS

## 2023-11-27 NOTE — ASSESSMENT & PLAN NOTE
Pt presents for consultation for approx 5 mm Anterior communicating artery aneurysm. Initially discovered on MRA head during workup for dizziness. Irina  in February 2023. Imaging reviewed personally and by attending. Final results below discussed with the patient. MRA head wo 2/15/23: 5 mm anterior communicating artery aneurysm. Plan  Discussed with patient the natural history of cerebral aneurysms. Discussed management, monitoring and avoidance of risk factors for aneurysm growth and rupture such as hyperlipidemia, HTN and avoidance of smoking. Certain genetic conditions are associated with the incidence of cerebral aneurysms. Discussed with patient to relay to their first degree family members, that they should be screened by their Primary Care Providers for cerebral aneurysms with MRA head after age 28. If no aneurysm is noted on initial imaging, surveillance should continue once every decade till about age 78. Discussed with pt sx of cerebral aneurysmal rupture and recommended that patient call 911 and present to Emergency Room if  they ever experience sudden severe  headache, seizure, mental status change, speech / vision change, sensory / motor change, or other neurological change. Dr. Estuardo Randolph discussed with patient further evaluation of her Acomm artery aneurysm with cerebral angiography. He discussed with pt the procedure in detail, including the risks and benefits to the procedure. Pt asked relevant questions which were answered to her satisfaction. Patient provided consent for the procedure and asked that we proceed with arrangements for the procedure. Pt is aware she will have preop clearance for the procedure including lab work and medical clearance. Pt will be contacted by the neurosurgery surgical coordinator to schedule the cerebral angiography and for medical clearance. Patient made aware to contact neurosurgery with any questions or concerns in the interim.

## 2023-11-28 ENCOUNTER — HOSPITAL ENCOUNTER (OUTPATIENT)
Dept: MAMMOGRAPHY | Facility: HOSPITAL | Age: 53
Discharge: HOME | End: 2023-11-28
Payer: COMMERCIAL

## 2023-11-28 VITALS — WEIGHT: 189 LBS | BODY MASS INDEX: 30.37 KG/M2 | HEIGHT: 66 IN

## 2023-11-28 DIAGNOSIS — Z12.31 SCREENING MAMMOGRAM, ENCOUNTER FOR: ICD-10-CM

## 2023-11-28 PROCEDURE — 77063 BREAST TOMOSYNTHESIS BI: CPT

## 2023-11-28 PROCEDURE — 77067 SCR MAMMO BI INCL CAD: CPT

## 2023-12-05 ENCOUNTER — TELEPHONE (OUTPATIENT)
Dept: NEUROSURGERY | Facility: CLINIC | Age: 53
End: 2023-12-05

## 2023-12-05 NOTE — TELEPHONE ENCOUNTER
LV informing pt I am looking to schedule procedure with Dr. Candace Ledbetter, provided SS#[id number] for scheduling.

## 2023-12-07 DIAGNOSIS — I67.1 ANEURYSM OF ANTERIOR COMMUNICATING ARTERY: Primary | ICD-10-CM

## 2023-12-11 ENCOUNTER — APPOINTMENT (OUTPATIENT)
Dept: LAB | Facility: HOSPITAL | Age: 53
End: 2023-12-11
Attending: RADIOLOGY
Payer: COMMERCIAL

## 2023-12-11 DIAGNOSIS — I67.1 ANEURYSM OF ANTERIOR COMMUNICATING ARTERY: ICD-10-CM

## 2023-12-11 LAB
ANION GAP SERPL CALCULATED.3IONS-SCNC: 8 MMOL/L
APTT PPP: 31 SECONDS (ref 23–37)
BASOPHILS # BLD AUTO: 0.05 THOUSANDS/ÂΜL (ref 0–0.1)
BASOPHILS NFR BLD AUTO: 1 % (ref 0–1)
BUN SERPL-MCNC: 10 MG/DL (ref 5–25)
CALCIUM SERPL-MCNC: 9.5 MG/DL (ref 8.4–10.2)
CHLORIDE SERPL-SCNC: 104 MMOL/L (ref 96–108)
CO2 SERPL-SCNC: 28 MMOL/L (ref 21–32)
CREAT SERPL-MCNC: 0.78 MG/DL (ref 0.6–1.3)
EOSINOPHIL # BLD AUTO: 0.27 THOUSAND/ÂΜL (ref 0–0.61)
EOSINOPHIL NFR BLD AUTO: 4 % (ref 0–6)
ERYTHROCYTE [DISTWIDTH] IN BLOOD BY AUTOMATED COUNT: 13.9 % (ref 11.6–15.1)
EST. AVERAGE GLUCOSE BLD GHB EST-MCNC: 128 MG/DL
GFR SERPL CREATININE-BSD FRML MDRD: 86 ML/MIN/1.73SQ M
GLUCOSE P FAST SERPL-MCNC: 101 MG/DL (ref 65–99)
HBA1C MFR BLD: 6.1 %
HCT VFR BLD AUTO: 41.7 % (ref 34.8–46.1)
HGB BLD-MCNC: 12.9 G/DL (ref 11.5–15.4)
IMM GRANULOCYTES # BLD AUTO: 0.02 THOUSAND/UL (ref 0–0.2)
IMM GRANULOCYTES NFR BLD AUTO: 0 % (ref 0–2)
INR PPP: 1.01 (ref 0.84–1.19)
LYMPHOCYTES # BLD AUTO: 2.28 THOUSANDS/ÂΜL (ref 0.6–4.47)
LYMPHOCYTES NFR BLD AUTO: 32 % (ref 14–44)
MCH RBC QN AUTO: 26.6 PG (ref 26.8–34.3)
MCHC RBC AUTO-ENTMCNC: 30.9 G/DL (ref 31.4–37.4)
MCV RBC AUTO: 86 FL (ref 82–98)
MONOCYTES # BLD AUTO: 0.42 THOUSAND/ÂΜL (ref 0.17–1.22)
MONOCYTES NFR BLD AUTO: 6 % (ref 4–12)
NEUTROPHILS # BLD AUTO: 4.09 THOUSANDS/ÂΜL (ref 1.85–7.62)
NEUTS SEG NFR BLD AUTO: 57 % (ref 43–75)
NRBC BLD AUTO-RTO: 0 /100 WBCS
PLATELET # BLD AUTO: 172 THOUSANDS/UL (ref 149–390)
PMV BLD AUTO: 12.1 FL (ref 8.9–12.7)
POTASSIUM SERPL-SCNC: 3.8 MMOL/L (ref 3.5–5.3)
PROTHROMBIN TIME: 13.2 SECONDS (ref 11.6–14.5)
RBC # BLD AUTO: 4.85 MILLION/UL (ref 3.81–5.12)
SODIUM SERPL-SCNC: 140 MMOL/L (ref 135–147)
WBC # BLD AUTO: 7.13 THOUSAND/UL (ref 4.31–10.16)

## 2023-12-11 PROCEDURE — 85730 THROMBOPLASTIN TIME PARTIAL: CPT

## 2023-12-11 PROCEDURE — 83036 HEMOGLOBIN GLYCOSYLATED A1C: CPT

## 2023-12-11 PROCEDURE — 36415 COLL VENOUS BLD VENIPUNCTURE: CPT

## 2023-12-11 PROCEDURE — 80048 BASIC METABOLIC PNL TOTAL CA: CPT

## 2023-12-11 PROCEDURE — 85025 COMPLETE CBC W/AUTO DIFF WBC: CPT

## 2023-12-11 PROCEDURE — 85610 PROTHROMBIN TIME: CPT

## 2023-12-15 ENCOUNTER — CONSULT (OUTPATIENT)
Dept: FAMILY MEDICINE CLINIC | Facility: CLINIC | Age: 53
End: 2023-12-15
Payer: COMMERCIAL

## 2023-12-15 VITALS
DIASTOLIC BLOOD PRESSURE: 88 MMHG | HEIGHT: 66 IN | OXYGEN SATURATION: 98 % | WEIGHT: 193 LBS | RESPIRATION RATE: 16 BRPM | SYSTOLIC BLOOD PRESSURE: 140 MMHG | HEART RATE: 70 BPM | TEMPERATURE: 96.6 F | BODY MASS INDEX: 31.02 KG/M2

## 2023-12-15 DIAGNOSIS — I67.1 CEREBRAL ANEURYSM: ICD-10-CM

## 2023-12-15 DIAGNOSIS — E03.9 ACQUIRED HYPOTHYROIDISM: ICD-10-CM

## 2023-12-15 DIAGNOSIS — R03.0 ELEVATED BLOOD PRESSURE READING IN OFFICE WITHOUT DIAGNOSIS OF HYPERTENSION: ICD-10-CM

## 2023-12-15 DIAGNOSIS — F41.9 ANXIETY: ICD-10-CM

## 2023-12-15 DIAGNOSIS — R73.01 IFG (IMPAIRED FASTING GLUCOSE): ICD-10-CM

## 2023-12-15 DIAGNOSIS — Z01.818 ENCOUNTER FOR PREOPERATIVE EXAMINATION FOR GENERAL SURGICAL PROCEDURE: Primary | ICD-10-CM

## 2023-12-15 DIAGNOSIS — I67.1 ANEURYSM OF ANTERIOR COMMUNICATING ARTERY: ICD-10-CM

## 2023-12-15 PROCEDURE — 99214 OFFICE O/P EST MOD 30 MIN: CPT | Performed by: INTERNAL MEDICINE

## 2023-12-15 NOTE — PROGRESS NOTES
Assessment/Plan:As per RCRI, Maria D Casiano is a Class 1 risk for complications/MACEs related to cerebral angiogram for JESSI aneurysm on 1/8/24. She may proceed with procedure as planned without further workup. We discussed her elevated blood pressure, and methods of bringing that down including low sodium diet, daily exercise and weight loss. A1c c/w IFGT so also discussed that with patient. All pre-procedure labwork was discussed in detail with patient. Problem List Items Addressed This Visit       Acquired hypothyroidism    Elevated blood pressure reading in office without diagnosis of hypertension    Aneurysm of anterior communicating artery    IFG (impaired fasting glucose)     Discussed diet, exercise lifestyle measures to help bring A1c% down          Other Visit Diagnoses       Encounter for preoperative examination for general surgical procedure    -  Primary    Cerebral aneurysm                  Subjective:      Patient ID: Anniece Oppenheim is a 46 y.o. female. Maria D Casiano here for preoperative risk assessment prior to cerebral angiogram with Dr Viv Martin on 1/8/24. She was incidentally found to have an aneurysm of the anterior communicating artery back in Feb when she had gone to the ER for an episode of vertigo, and is following up with angiogram.  She has NO history of CAD, CHF, CVA, insulin requiring DM, or renal dysfunction (Cr above 2). Has no chest pain or sob upon exertion. She is a non smoker and a nondrinker. Pre-procedure labwork was reviewed with patient, showing normal CBC, normal PTT and PT/INR, and BMP with Cr of 0.78. Her A1c went up a bit to 6.1%, c/w IFGT, and we discussed some dietary and exercise measures to help with that. BP today a bit elevated at 140/88 upon retake.   Says she has a lot of stress going on with anxiety about the angiogram, and she and her  are not getting along, job, holidays, etc         The following portions of the patient's history were reviewed and updated as appropriate:   Past Medical History:  She has a past medical history of Abnormal Pap smear of cervix, Anemia, Arthritis, Asthma, Colon polyp, History of vertigo, Hyperlipemia, Hypertriglyceridemia, Hypothyroidism, Impaired fasting glucose, Insomnia, Lab test positive for detection of COVID-19 virus (12/22/2021), Premenstrual tension syndrome, and Tendonitis. ,  _______________________________________________________________________  Medical Problems:  does not have any pertinent problems on file.,  _______________________________________________________________________  Past Surgical History:   has a past surgical history that includes Colonoscopy (2016); Mammo (historical) (Bilateral, 11/03/2020); [place] tooth extraction; and Colonoscopy (11/04/2023). ,  _______________________________________________________________________  Family History:  family history includes Alzheimer's disease in her maternal aunt; Brain cancer in her paternal grandmother; Breast cancer in her maternal grandmother; Colon cancer (age of onset: 67) in her mother; Diabetes in her mother; Heart attack in her father; Heart disease in her father; Heart failure in her father; Hyperlipidemia in her father; Hypertension in her mother; No Known Problems in her maternal grandfather, paternal grandfather, paternal uncle, sister, and son.,  _______________________________________________________________________  Social History:   reports that she has never smoked. She has never used smokeless tobacco. She reports current alcohol use. She reports that she does not use drugs. ,  _______________________________________________________________________  Allergies:  is allergic to niacin and mucinex fast-max chest glenn ms [guaifenesin]. .  _______________________________________________________________________  Current Outpatient Medications   Medication Sig Dispense Refill    ALBUTEROL IN Inhale if needed      levothyroxine 50 mcg tablet TAKE 1 TABLET BY MOUTH EVERY DAY 90 tablet 3    Multiple Vitamin (MULTI-VITAMIN DAILY PO) Take by mouth in the morning      Zolpidem Tartrate (AMBIEN PO) Take by mouth       No current facility-administered medications for this visit.     _______________________________________________________________________  Review of Systems   Constitutional: Negative. HENT: Negative. Respiratory: Negative. Cardiovascular: Negative. Gastrointestinal: Negative. Neurological: Negative. Negative for headaches. Psychiatric/Behavioral:  Positive for dysphoric mood and sleep disturbance. The patient is nervous/anxious. Objective:  Vitals:    12/15/23 1146 12/15/23 1207   BP: 130/90 140/88   BP Location: Left arm    Patient Position: Sitting    Cuff Size: Standard    Pulse: 70    Resp: 16    Temp: (!) 96.6 °F (35.9 °C)    TempSrc: Tympanic    SpO2: 98%    Weight: 87.5 kg (193 lb)    Height: 5' 6" (1.676 m)      Body mass index is 31.15 kg/m². Physical Exam  Constitutional:       Appearance: She is obese. HENT:      Head: Normocephalic and atraumatic. Right Ear: External ear normal.      Left Ear: External ear normal.      Nose: Nose normal.      Mouth/Throat:      Mouth: Mucous membranes are moist.   Cardiovascular:      Rate and Rhythm: Normal rate and regular rhythm. Heart sounds: Normal heart sounds. Pulmonary:      Effort: Pulmonary effort is normal.      Breath sounds: Normal breath sounds. Abdominal:      Palpations: Abdomen is soft. Musculoskeletal:         General: Normal range of motion. Cervical back: Normal range of motion and neck supple. Skin:     General: Skin is warm. Neurological:      General: No focal deficit present. Mental Status: She is alert and oriented to person, place, and time. Psychiatric:         Thought Content:  Thought content normal.         Judgment: Judgment normal.

## 2024-01-01 ENCOUNTER — HOSPITAL ENCOUNTER (INPATIENT)
Facility: HOSPITAL | Age: 54
LOS: 3 days | DRG: 023 | End: 2024-05-13
Attending: NEUROLOGICAL SURGERY | Admitting: EMERGENCY MEDICINE
Payer: COMMERCIAL

## 2024-01-01 ENCOUNTER — HOSPITAL ENCOUNTER (EMERGENCY)
Facility: HOSPITAL | Age: 54
End: 2024-05-10
Attending: EMERGENCY MEDICINE
Payer: COMMERCIAL

## 2024-01-01 ENCOUNTER — APPOINTMENT (INPATIENT)
Dept: RADIOLOGY | Facility: HOSPITAL | Age: 54
DRG: 023 | End: 2024-01-01
Payer: COMMERCIAL

## 2024-01-01 ENCOUNTER — APPOINTMENT (EMERGENCY)
Dept: RADIOLOGY | Facility: HOSPITAL | Age: 54
End: 2024-01-01
Payer: COMMERCIAL

## 2024-01-01 ENCOUNTER — ANESTHESIA (INPATIENT)
Dept: PERIOP | Facility: HOSPITAL | Age: 54
End: 2024-01-01
Payer: COMMERCIAL

## 2024-01-01 ENCOUNTER — APPOINTMENT (INPATIENT)
Dept: NON INVASIVE DIAGNOSTICS | Facility: HOSPITAL | Age: 54
DRG: 023 | End: 2024-01-01
Payer: COMMERCIAL

## 2024-01-01 ENCOUNTER — ANESTHESIA EVENT (INPATIENT)
Dept: PERIOP | Facility: HOSPITAL | Age: 54
End: 2024-01-01
Payer: COMMERCIAL

## 2024-01-01 VITALS
HEIGHT: 66 IN | OXYGEN SATURATION: 96 % | BODY MASS INDEX: 31 KG/M2 | TEMPERATURE: 94.6 F | DIASTOLIC BLOOD PRESSURE: 76 MMHG | SYSTOLIC BLOOD PRESSURE: 150 MMHG | HEART RATE: 55 BPM | RESPIRATION RATE: 16 BRPM | WEIGHT: 192.9 LBS

## 2024-01-01 DIAGNOSIS — G93.40 ENCEPHALOPATHY: ICD-10-CM

## 2024-01-01 DIAGNOSIS — J96.00 ACUTE RESPIRATORY FAILURE (HCC): ICD-10-CM

## 2024-01-01 DIAGNOSIS — T68.XXXA HYPOTHERMIA, INITIAL ENCOUNTER: ICD-10-CM

## 2024-01-01 DIAGNOSIS — I63.9 STROKE (HCC): Primary | ICD-10-CM

## 2024-01-01 DIAGNOSIS — R00.1 BRADYCARDIA: ICD-10-CM

## 2024-01-01 DIAGNOSIS — I61.9 INTRAPARENCHYMAL HEMORRHAGE OF BRAIN (HCC): Primary | ICD-10-CM

## 2024-01-01 LAB
AAASAAGGREGATION: 45.9 % (ref 89–100)
AAASAINHIBITION: 54.1 % (ref 0–11)
AAMA (MAX AMPLITUDE AA): 42 MM (ref 51–71)
ABO GROUP BLD BPU: NORMAL
ABO GROUP BLD: NORMAL
ACTFMA(MAX AMPLITUDE ACTF): 17.5 MM (ref 2–19)
ADPADPAGGREGATION: 20.6 % (ref 83–100)
ADPADPINHIBITION: 79.4 % (ref 0–17)
ADPMA(MAX AMPLITUDE ADP): 28.5 MM (ref 45–69)
ALBUMIN SERPL BCP-MCNC: 3.2 G/DL (ref 3.5–5)
ALBUMIN SERPL BCP-MCNC: 3.4 G/DL (ref 3.5–5)
ALBUMIN SERPL BCP-MCNC: 3.4 G/DL (ref 3.5–5)
ALP SERPL-CCNC: 45 U/L (ref 34–104)
ALP SERPL-CCNC: 49 U/L (ref 34–104)
ALP SERPL-CCNC: 49 U/L (ref 34–104)
ALT SERPL W P-5'-P-CCNC: 8 U/L (ref 7–52)
ALT SERPL W P-5'-P-CCNC: 9 U/L (ref 7–52)
ALT SERPL W P-5'-P-CCNC: 9 U/L (ref 7–52)
ANION GAP SERPL CALCULATED.3IONS-SCNC: 10 MMOL/L (ref 4–13)
ANION GAP SERPL CALCULATED.3IONS-SCNC: 10 MMOL/L (ref 4–13)
ANION GAP SERPL CALCULATED.3IONS-SCNC: 12 MMOL/L (ref 4–13)
ANION GAP SERPL CALCULATED.3IONS-SCNC: 7 MMOL/L (ref 4–13)
ANION GAP SERPL CALCULATED.3IONS-SCNC: 7 MMOL/L (ref 4–13)
ANION GAP SERPL CALCULATED.3IONS-SCNC: 8 MMOL/L (ref 4–13)
ANION GAP SERPL CALCULATED.3IONS-SCNC: 9 MMOL/L (ref 4–13)
ANION GAP SERPL CALCULATED.3IONS-SCNC: 9 MMOL/L (ref 4–13)
AORTIC ROOT: 3.2 CM
AORTIC VALVE MEAN VELOCITY: 8.2 M/S
APICAL FOUR CHAMBER EJECTION FRACTION: 72 %
APTT PPP: 25 SECONDS (ref 23–37)
APTT PPP: 26 SECONDS (ref 23–37)
APTT PPP: 27 SECONDS (ref 23–37)
APTT PPP: 29 SECONDS (ref 23–37)
ASCENDING AORTA: 3.4 CM
AST SERPL W P-5'-P-CCNC: 18 U/L (ref 13–39)
AST SERPL W P-5'-P-CCNC: 20 U/L (ref 13–39)
AST SERPL W P-5'-P-CCNC: 20 U/L (ref 13–39)
ATRIAL RATE: 102 BPM
AV LVOT MEAN GRADIENT: 1 MMHG
AV LVOT PEAK GRADIENT: 3 MMHG
AV MEAN GRADIENT: 3 MMHG
AV PEAK GRADIENT: 6 MMHG
AV VELOCITY RATIO: 0.7
B-HCG SERPL-ACNC: 1.9 MIU/ML (ref 0–5)
BACTERIA UR QL AUTO: ABNORMAL /HPF
BASE EX.OXY STD BLDV CALC-SCNC: 73 % (ref 60–80)
BASE EXCESS BLDA CALC-SCNC: -1 MMOL/L (ref -2–3)
BASE EXCESS BLDA CALC-SCNC: -11 MMOL/L (ref -2–3)
BASE EXCESS BLDA CALC-SCNC: -3 MMOL/L (ref -2–3)
BASE EXCESS BLDA CALC-SCNC: -3 MMOL/L (ref -2–3)
BASE EXCESS BLDA CALC-SCNC: -4.4 MMOL/L
BASE EXCESS BLDA CALC-SCNC: -5 MMOL/L (ref -2–3)
BASE EXCESS BLDA CALC-SCNC: -5.1 MMOL/L
BASE EXCESS BLDA CALC-SCNC: -6.4 MMOL/L
BASE EXCESS BLDA CALC-SCNC: -9.2 MMOL/L
BASE EXCESS BLDV CALC-SCNC: -5.7 MMOL/L
BASOPHILS # BLD AUTO: 0.02 THOUSANDS/ÂΜL (ref 0–0.1)
BASOPHILS # BLD AUTO: 0.03 THOUSANDS/ÂΜL (ref 0–0.1)
BASOPHILS # BLD AUTO: 0.03 THOUSANDS/ÂΜL (ref 0–0.1)
BASOPHILS # BLD AUTO: 0.04 THOUSANDS/ÂΜL (ref 0–0.1)
BASOPHILS NFR BLD AUTO: 0 % (ref 0–1)
BILIRUB DIRECT SERPL-MCNC: 0.08 MG/DL (ref 0–0.2)
BILIRUB SERPL-MCNC: 0.36 MG/DL (ref 0.2–1)
BILIRUB SERPL-MCNC: 0.44 MG/DL (ref 0.2–1)
BILIRUB SERPL-MCNC: 0.44 MG/DL (ref 0.2–1)
BILIRUB UR QL STRIP: NEGATIVE
BLD GP AB SCN SERPL QL: NEGATIVE
BODY TEMPERATURE: 96.1 DEGREES FEHRENHEIT
BODY TEMPERATURE: 97.2 DEGREES FEHRENHEIT
BODY TEMPERATURE: 98.2 DEGREES FEHRENHEIT
BPU ID: NORMAL
BSA FOR ECHO PROCEDURE: 1.97 M2
BUN SERPL-MCNC: 10 MG/DL (ref 5–25)
BUN SERPL-MCNC: 12 MG/DL (ref 5–25)
BUN SERPL-MCNC: 12 MG/DL (ref 5–25)
BUN SERPL-MCNC: 13 MG/DL (ref 5–25)
BUN SERPL-MCNC: 9 MG/DL (ref 5–25)
CA-I BLD-SCNC: 1.04 MMOL/L (ref 1.12–1.32)
CA-I BLD-SCNC: 1.05 MMOL/L (ref 1.12–1.32)
CA-I BLD-SCNC: 1.09 MMOL/L (ref 1.12–1.32)
CA-I BLD-SCNC: 1.16 MMOL/L (ref 1.12–1.32)
CA-I BLD-SCNC: 1.24 MMOL/L (ref 1.12–1.32)
CA-I BLD-SCNC: 1.29 MMOL/L (ref 1.12–1.32)
CA-I BLD-SCNC: 1.29 MMOL/L (ref 1.12–1.32)
CALCIUM ALBUM COR SERPL-MCNC: 8.6 MG/DL (ref 8.3–10.1)
CALCIUM ALBUM COR SERPL-MCNC: 8.7 MG/DL (ref 8.3–10.1)
CALCIUM SERPL-MCNC: 7.5 MG/DL (ref 8.4–10.2)
CALCIUM SERPL-MCNC: 7.7 MG/DL (ref 8.4–10.2)
CALCIUM SERPL-MCNC: 7.9 MG/DL (ref 8.4–10.2)
CALCIUM SERPL-MCNC: 8 MG/DL (ref 8.4–10.2)
CALCIUM SERPL-MCNC: 8.1 MG/DL (ref 8.4–10.2)
CALCIUM SERPL-MCNC: 8.1 MG/DL (ref 8.4–10.2)
CALCIUM SERPL-MCNC: 8.2 MG/DL (ref 8.4–10.2)
CALCIUM SERPL-MCNC: 8.5 MG/DL (ref 8.4–10.2)
CARDIAC TROPONIN I PNL SERPL HS: <2 NG/L
CARDIAC TROPONIN I PNL SERPL HS: <2 NG/L
CHLORIDE SERPL-SCNC: 105 MMOL/L (ref 96–108)
CHLORIDE SERPL-SCNC: 116 MMOL/L (ref 96–108)
CHLORIDE SERPL-SCNC: 117 MMOL/L (ref 96–108)
CHLORIDE SERPL-SCNC: 118 MMOL/L (ref 96–108)
CHLORIDE SERPL-SCNC: 118 MMOL/L (ref 96–108)
CHLORIDE SERPL-SCNC: 128 MMOL/L (ref 96–108)
CLARITY UR: CLEAR
CO2 SERPL-SCNC: 19 MMOL/L (ref 21–32)
CO2 SERPL-SCNC: 19 MMOL/L (ref 21–32)
CO2 SERPL-SCNC: 20 MMOL/L (ref 21–32)
CO2 SERPL-SCNC: 21 MMOL/L (ref 21–32)
CO2 SERPL-SCNC: 22 MMOL/L (ref 21–32)
CO2 SERPL-SCNC: 22 MMOL/L (ref 21–32)
COLOR UR: ABNORMAL
CREAT SERPL-MCNC: 0.65 MG/DL (ref 0.6–1.3)
CREAT SERPL-MCNC: 0.68 MG/DL (ref 0.6–1.3)
CREAT SERPL-MCNC: 0.69 MG/DL (ref 0.6–1.3)
CREAT SERPL-MCNC: 0.7 MG/DL (ref 0.6–1.3)
CREAT SERPL-MCNC: 0.71 MG/DL (ref 0.6–1.3)
CREAT SERPL-MCNC: 0.71 MG/DL (ref 0.6–1.3)
CREAT SERPL-MCNC: 0.78 MG/DL (ref 0.6–1.3)
CREAT SERPL-MCNC: 0.85 MG/DL (ref 0.6–1.3)
DOP CALC AO PEAK VEL: 1.27 M/S
DOP CALC AO VTI: 22.12 CM
DOP CALC LVOT PEAK VEL VTI: 14.43 CM
DOP CALC LVOT PEAK VEL: 0.89 M/S
E WAVE DECELERATION TIME: 138 MS
E/A RATIO: 0.87
EOSINOPHIL # BLD AUTO: 0 THOUSAND/ÂΜL (ref 0–0.61)
EOSINOPHIL # BLD AUTO: 0 THOUSAND/ÂΜL (ref 0–0.61)
EOSINOPHIL # BLD AUTO: 0.03 THOUSAND/ÂΜL (ref 0–0.61)
EOSINOPHIL # BLD AUTO: 0.08 THOUSAND/ÂΜL (ref 0–0.61)
EOSINOPHIL NFR BLD AUTO: 0 % (ref 0–6)
EOSINOPHIL NFR BLD AUTO: 1 % (ref 0–6)
ERYTHROCYTE [DISTWIDTH] IN BLOOD BY AUTOMATED COUNT: 14.6 % (ref 11.6–15.1)
ERYTHROCYTE [DISTWIDTH] IN BLOOD BY AUTOMATED COUNT: 14.7 % (ref 11.6–15.1)
ERYTHROCYTE [DISTWIDTH] IN BLOOD BY AUTOMATED COUNT: 14.7 % (ref 11.6–15.1)
ERYTHROCYTE [DISTWIDTH] IN BLOOD BY AUTOMATED COUNT: 15.2 % (ref 11.6–15.1)
ERYTHROCYTE [DISTWIDTH] IN BLOOD BY AUTOMATED COUNT: 15.3 % (ref 11.6–15.1)
FLUAV RNA RESP QL NAA+PROBE: NEGATIVE
FLUBV RNA RESP QL NAA+PROBE: NEGATIVE
FRACTIONAL SHORTENING: 33 (ref 28–44)
GFR SERPL CREATININE-BSD FRML MDRD: 100 ML/MIN/1.73SQ M
GFR SERPL CREATININE-BSD FRML MDRD: 77 ML/MIN/1.73SQ M
GFR SERPL CREATININE-BSD FRML MDRD: 86 ML/MIN/1.73SQ M
GFR SERPL CREATININE-BSD FRML MDRD: 96 ML/MIN/1.73SQ M
GFR SERPL CREATININE-BSD FRML MDRD: 96 ML/MIN/1.73SQ M
GFR SERPL CREATININE-BSD FRML MDRD: 98 ML/MIN/1.73SQ M
GFR SERPL CREATININE-BSD FRML MDRD: 99 ML/MIN/1.73SQ M
GFR SERPL CREATININE-BSD FRML MDRD: 99 ML/MIN/1.73SQ M
GLUCOSE SERPL-MCNC: 113 MG/DL (ref 65–140)
GLUCOSE SERPL-MCNC: 131 MG/DL (ref 65–140)
GLUCOSE SERPL-MCNC: 134 MG/DL (ref 65–140)
GLUCOSE SERPL-MCNC: 154 MG/DL (ref 65–140)
GLUCOSE SERPL-MCNC: 166 MG/DL (ref 65–140)
GLUCOSE SERPL-MCNC: 171 MG/DL (ref 65–140)
GLUCOSE SERPL-MCNC: 173 MG/DL (ref 65–140)
GLUCOSE SERPL-MCNC: 175 MG/DL (ref 65–140)
GLUCOSE SERPL-MCNC: 180 MG/DL (ref 65–140)
GLUCOSE SERPL-MCNC: 182 MG/DL (ref 65–140)
GLUCOSE SERPL-MCNC: 193 MG/DL (ref 65–140)
GLUCOSE SERPL-MCNC: 200 MG/DL (ref 65–140)
GLUCOSE SERPL-MCNC: 236 MG/DL (ref 65–140)
GLUCOSE SERPL-MCNC: 237 MG/DL (ref 65–140)
GLUCOSE UR STRIP-MCNC: NEGATIVE MG/DL
HCO3 BLDA-SCNC: 14.6 MMOL/L (ref 22–28)
HCO3 BLDA-SCNC: 15.1 MMOL/L (ref 22–28)
HCO3 BLDA-SCNC: 18.9 MMOL/L (ref 22–28)
HCO3 BLDA-SCNC: 19.7 MMOL/L (ref 22–28)
HCO3 BLDA-SCNC: 20 MMOL/L (ref 22–28)
HCO3 BLDA-SCNC: 20.1 MMOL/L (ref 22–28)
HCO3 BLDA-SCNC: 22.3 MMOL/L (ref 22–28)
HCO3 BLDA-SCNC: 25.8 MMOL/L (ref 24–30)
HCO3 BLDA-SCNC: 26.1 MMOL/L (ref 22–28)
HCO3 BLDV-SCNC: 21.5 MMOL/L (ref 24–30)
HCT VFR BLD AUTO: 22.6 % (ref 34.8–46.1)
HCT VFR BLD AUTO: 25 % (ref 34.8–46.1)
HCT VFR BLD AUTO: 26.6 % (ref 34.8–46.1)
HCT VFR BLD AUTO: 32.4 % (ref 34.8–46.1)
HCT VFR BLD AUTO: 41 % (ref 34.8–46.1)
HCT VFR BLD CALC: 17 % (ref 34.8–46.1)
HCT VFR BLD CALC: 19 % (ref 34.8–46.1)
HCT VFR BLD CALC: 19 % (ref 34.8–46.1)
HCT VFR BLD CALC: 20 % (ref 34.8–46.1)
HCT VFR BLD CALC: 20 % (ref 34.8–46.1)
HGB BLD-MCNC: 10.4 G/DL (ref 11.5–15.4)
HGB BLD-MCNC: 12.3 G/DL (ref 11.5–15.4)
HGB BLD-MCNC: 7 G/DL (ref 11.5–15.4)
HGB BLD-MCNC: 7.8 G/DL (ref 11.5–15.4)
HGB BLD-MCNC: 8.6 G/DL (ref 11.5–15.4)
HGB BLDA-MCNC: 5.8 G/DL (ref 11.5–15.4)
HGB BLDA-MCNC: 6.5 G/DL (ref 11.5–15.4)
HGB BLDA-MCNC: 6.5 G/DL (ref 11.5–15.4)
HGB BLDA-MCNC: 6.8 G/DL (ref 11.5–15.4)
HGB BLDA-MCNC: 6.8 G/DL (ref 11.5–15.4)
HGB UR QL STRIP.AUTO: NEGATIVE
HKHMA(MAX AMPLITUDE KAOLIN): 70.9 MM (ref 53–68)
HOROWITZ INDEX BLDA+IHG-RTO: 40 MM[HG]
HOROWITZ INDEX BLDA+IHG-RTO: 60 MM[HG]
HOROWITZ INDEX BLDA+IHG-RTO: 60 MM[HG]
HYALINE CASTS #/AREA URNS LPF: ABNORMAL /LPF
IMM GRANULOCYTES # BLD AUTO: 0.03 THOUSAND/UL (ref 0–0.2)
IMM GRANULOCYTES # BLD AUTO: 0.04 THOUSAND/UL (ref 0–0.2)
IMM GRANULOCYTES # BLD AUTO: 0.05 THOUSAND/UL (ref 0–0.2)
IMM GRANULOCYTES # BLD AUTO: 0.15 THOUSAND/UL (ref 0–0.2)
IMM GRANULOCYTES NFR BLD AUTO: 0 % (ref 0–2)
IMM GRANULOCYTES NFR BLD AUTO: 1 % (ref 0–2)
INR PPP: 0.87 (ref 0.84–1.19)
INR PPP: 1.04 (ref 0.84–1.19)
INR PPP: 1.16 (ref 0.84–1.19)
INR PPP: 1.2 (ref 0.84–1.19)
INTERVENTRICULAR SEPTUM IN DIASTOLE (PARASTERNAL SHORT AXIS VIEW): 0.7 CM
INTERVENTRICULAR SEPTUM: 0.7 CM (ref 0.6–1.1)
KETONES UR STRIP-MCNC: NEGATIVE MG/DL
LAAS-AP2: 11.5 CM2
LAAS-AP4: 13.2 CM2
LACTATE SERPL-SCNC: 3.3 MMOL/L (ref 0.5–2)
LACTATE SERPL-SCNC: 4.5 MMOL/L (ref 0.5–2)
LEFT ATRIUM SIZE: 2.1 CM
LEFT ATRIUM VOLUME (MOD BIPLANE): 30 ML
LEFT ATRIUM VOLUME INDEX (MOD BIPLANE): 15.2 ML/M2
LEFT INTERNAL DIMENSION IN SYSTOLE: 2.9 CM (ref 2.1–4)
LEFT VENTRICULAR INTERNAL DIMENSION IN DIASTOLE: 4.3 CM (ref 3.5–6)
LEFT VENTRICULAR POSTERIOR WALL IN END DIASTOLE: 0.8 CM
LEFT VENTRICULAR STROKE VOLUME: 50 ML
LEUKOCYTE ESTERASE UR QL STRIP: NEGATIVE
LVSV (TEICH): 50 ML
LYMPHOCYTES # BLD AUTO: 0.73 THOUSANDS/ÂΜL (ref 0.6–4.47)
LYMPHOCYTES # BLD AUTO: 1.12 THOUSANDS/ÂΜL (ref 0.6–4.47)
LYMPHOCYTES # BLD AUTO: 1.46 THOUSANDS/ÂΜL (ref 0.6–4.47)
LYMPHOCYTES # BLD AUTO: 1.49 THOUSANDS/ÂΜL (ref 0.6–4.47)
LYMPHOCYTES NFR BLD AUTO: 10 % (ref 14–44)
LYMPHOCYTES NFR BLD AUTO: 12 % (ref 14–44)
LYMPHOCYTES NFR BLD AUTO: 7 % (ref 14–44)
LYMPHOCYTES NFR BLD AUTO: 8 % (ref 14–44)
MAGNESIUM SERPL-MCNC: 1.6 MG/DL (ref 1.9–2.7)
MAGNESIUM SERPL-MCNC: 1.9 MG/DL (ref 1.9–2.7)
MAGNESIUM SERPL-MCNC: 2 MG/DL (ref 1.9–2.7)
MCH RBC QN AUTO: 26.8 PG (ref 26.8–34.3)
MCH RBC QN AUTO: 27.2 PG (ref 26.8–34.3)
MCH RBC QN AUTO: 27.3 PG (ref 26.8–34.3)
MCH RBC QN AUTO: 27.6 PG (ref 26.8–34.3)
MCH RBC QN AUTO: 28.3 PG (ref 26.8–34.3)
MCHC RBC AUTO-ENTMCNC: 30 G/DL (ref 31.4–37.4)
MCHC RBC AUTO-ENTMCNC: 31 G/DL (ref 31.4–37.4)
MCHC RBC AUTO-ENTMCNC: 31.2 G/DL (ref 31.4–37.4)
MCHC RBC AUTO-ENTMCNC: 32.1 G/DL (ref 31.4–37.4)
MCHC RBC AUTO-ENTMCNC: 32.3 G/DL (ref 31.4–37.4)
MCV RBC AUTO: 85 FL (ref 82–98)
MCV RBC AUTO: 88 FL (ref 82–98)
MCV RBC AUTO: 89 FL (ref 82–98)
MONOCYTES # BLD AUTO: 0.09 THOUSAND/ÂΜL (ref 0.17–1.22)
MONOCYTES # BLD AUTO: 0.69 THOUSAND/ÂΜL (ref 0.17–1.22)
MONOCYTES # BLD AUTO: 0.92 THOUSAND/ÂΜL (ref 0.17–1.22)
MONOCYTES # BLD AUTO: 1.04 THOUSAND/ÂΜL (ref 0.17–1.22)
MONOCYTES NFR BLD AUTO: 1 % (ref 4–12)
MONOCYTES NFR BLD AUTO: 5 % (ref 4–12)
MONOCYTES NFR BLD AUTO: 6 % (ref 4–12)
MONOCYTES NFR BLD AUTO: 8 % (ref 4–12)
MUCOUS THREADS UR QL AUTO: ABNORMAL
MV E'TISSUE VEL-SEP: 7 CM/S
MV PEAK A VEL: 1.04 M/S
MV PEAK E VEL: 90 CM/S
MV STENOSIS PRESSURE HALF TIME: 40 MS
MV VALVE AREA P 1/2 METHOD: 5.5
NEUTROPHILS # BLD AUTO: 10.76 THOUSANDS/ÂΜL (ref 1.85–7.62)
NEUTROPHILS # BLD AUTO: 16.65 THOUSANDS/ÂΜL (ref 1.85–7.62)
NEUTROPHILS # BLD AUTO: 9.54 THOUSANDS/ÂΜL (ref 1.85–7.62)
NEUTROPHILS # BLD AUTO: 9.67 THOUSANDS/ÂΜL (ref 1.85–7.62)
NEUTS SEG NFR BLD AUTO: 82 % (ref 43–75)
NEUTS SEG NFR BLD AUTO: 86 % (ref 43–75)
NEUTS SEG NFR BLD AUTO: 86 % (ref 43–75)
NEUTS SEG NFR BLD AUTO: 87 % (ref 43–75)
NITRITE UR QL STRIP: NEGATIVE
NON-SQ EPI CELLS URNS QL MICRO: ABNORMAL /HPF
NRBC BLD AUTO-RTO: 0 /100 WBCS
O2 CT BLDA-SCNC: 10.6 ML/DL (ref 16–23)
O2 CT BLDA-SCNC: 12.6 ML/DL (ref 16–23)
O2 CT BLDA-SCNC: 14.1 ML/DL (ref 16–23)
O2 CT BLDA-SCNC: 6.9 ML/DL (ref 16–23)
O2 CT BLDV-SCNC: 14.6 ML/DL
OSMOLALITY UR/SERPL-RTO: 312 MMOL/KG (ref 282–298)
OSMOLALITY UR/SERPL-RTO: 313 MMOL/KG (ref 282–298)
OSMOLALITY UR/SERPL-RTO: 313 MMOL/KG (ref 282–298)
OSMOLALITY UR/SERPL-RTO: 329 MMOL/KG (ref 282–298)
OXYHGB MFR BLDA: 97.3 % (ref 94–97)
OXYHGB MFR BLDA: 97.7 % (ref 94–97)
OXYHGB MFR BLDA: 98.5 % (ref 94–97)
OXYHGB MFR BLDA: 98.9 % (ref 94–97)
PA ADP BLD-ACNC: 8 PRU (ref 194–418)
PCO2 BLD: 15 MMOL/L (ref 21–32)
PCO2 BLD: 21 MMOL/L (ref 21–32)
PCO2 BLD: 23 MMOL/L (ref 21–32)
PCO2 BLD: 28 MMOL/L (ref 21–32)
PCO2 BLD: 28 MMOL/L (ref 21–32)
PCO2 BLD: 28.5 MM HG (ref 36–44)
PCO2 BLD: 34.8 MM HG (ref 36–44)
PCO2 BLD: 39 MM HG (ref 36–44)
PCO2 BLD: 59.7 MM HG (ref 36–44)
PCO2 BLD: 72.6 MM HG (ref 42–50)
PCO2 BLDA: 25.8 MM HG (ref 36–44)
PCO2 BLDA: 34.5 MM HG (ref 36–44)
PCO2 BLDA: 35.1 MM HG (ref 36–44)
PCO2 BLDA: 36.8 MM HG (ref 36–44)
PCO2 BLDV: 48.7 MM HG (ref 42–50)
PCO2 TEMP ADJ BLDA: 33.3 MM HG (ref 36–44)
PCO2 TEMP ADJ BLDA: 34.6 MM HG (ref 36–44)
PCO2 TEMP ADJ BLDA: 34.8 MM HG (ref 36–44)
PEEP RESPIRATORY: 6 CM[H2O]
PH BLD: 7.16 [PH] (ref 7.3–7.4)
PH BLD: 7.25 [PH] (ref 7.35–7.45)
PH BLD: 7.32 [PH] (ref 7.35–7.45)
PH BLD: 7.35 [PH] (ref 7.35–7.45)
PH BLD: 7.36 [PH] (ref 7.35–7.45)
PH BLD: 7.37 [PH] (ref 7.35–7.45)
PH BLD: 7.37 [PH] (ref 7.35–7.45)
PH BLD: 7.4 [PH] (ref 7.35–7.45)
PH BLDA: 7.33 [PH] (ref 7.35–7.45)
PH BLDA: 7.37 [PH] (ref 7.35–7.45)
PH BLDA: 7.38 [PH] (ref 7.35–7.45)
PH BLDA: 7.39 [PH] (ref 7.35–7.45)
PH BLDV: 7.26 [PH] (ref 7.3–7.4)
PH UR STRIP.AUTO: 5.5 [PH]
PHOSPHATE SERPL-MCNC: 2.5 MG/DL (ref 2.7–4.5)
PHOSPHATE SERPL-MCNC: 3.4 MG/DL (ref 2.7–4.5)
PLATELET # BLD AUTO: 209 THOUSANDS/UL (ref 149–390)
PLATELET # BLD AUTO: 255 THOUSANDS/UL (ref 149–390)
PLATELET # BLD AUTO: 343 THOUSANDS/UL (ref 149–390)
PLATELET # BLD AUTO: 426 THOUSANDS/UL (ref 149–390)
PLATELET # BLD AUTO: 431 THOUSANDS/UL (ref 149–390)
PMV BLD AUTO: 10.4 FL (ref 8.9–12.7)
PMV BLD AUTO: 10.7 FL (ref 8.9–12.7)
PMV BLD AUTO: 10.8 FL (ref 8.9–12.7)
PMV BLD AUTO: 11.3 FL (ref 8.9–12.7)
PMV BLD AUTO: 11.9 FL (ref 8.9–12.7)
PO2 BLD: 136.2 MM HG (ref 75–129)
PO2 BLD: 157 MM HG (ref 75–129)
PO2 BLD: 245.3 MM HG (ref 75–129)
PO2 BLD: 254 MM HG (ref 75–129)
PO2 BLD: 293 MM HG (ref 75–129)
PO2 BLD: >400 MM HG (ref 35–45)
PO2 BLD: >400 MM HG (ref 75–129)
PO2 BLD: >400 MM HG (ref 75–129)
PO2 BLDA: 141 MM HG (ref 75–129)
PO2 BLDA: 157.2 MM HG (ref 75–129)
PO2 BLDA: 246.3 MM HG (ref 75–129)
PO2 BLDA: 299.7 MM HG (ref 75–129)
PO2 BLDV: 43.3 MM HG (ref 35–45)
POTASSIUM BLD-SCNC: 3.2 MMOL/L (ref 3.5–5.3)
POTASSIUM BLD-SCNC: 3.3 MMOL/L (ref 3.5–5.3)
POTASSIUM BLD-SCNC: 3.7 MMOL/L (ref 3.5–5.3)
POTASSIUM BLD-SCNC: 3.8 MMOL/L (ref 3.5–5.3)
POTASSIUM BLD-SCNC: 3.8 MMOL/L (ref 3.5–5.3)
POTASSIUM SERPL-SCNC: 3 MMOL/L (ref 3.5–5.3)
POTASSIUM SERPL-SCNC: 3.7 MMOL/L (ref 3.5–5.3)
POTASSIUM SERPL-SCNC: 3.9 MMOL/L (ref 3.5–5.3)
POTASSIUM SERPL-SCNC: 3.9 MMOL/L (ref 3.5–5.3)
POTASSIUM SERPL-SCNC: 4 MMOL/L (ref 3.5–5.3)
POTASSIUM SERPL-SCNC: 4.1 MMOL/L (ref 3.5–5.3)
POTASSIUM SERPL-SCNC: 4.1 MMOL/L (ref 3.5–5.3)
POTASSIUM SERPL-SCNC: 4.8 MMOL/L (ref 3.5–5.3)
PROT SERPL-MCNC: 5.5 G/DL (ref 6.4–8.4)
PROT SERPL-MCNC: 5.8 G/DL (ref 6.4–8.4)
PROT SERPL-MCNC: 5.8 G/DL (ref 6.4–8.4)
PROT UR STRIP-MCNC: ABNORMAL MG/DL
PROTHROMBIN TIME: 12.4 SECONDS (ref 11.6–14.5)
PROTHROMBIN TIME: 13.5 SECONDS (ref 11.6–14.5)
PROTHROMBIN TIME: 14.7 SECONDS (ref 11.6–14.5)
PROTHROMBIN TIME: 15.1 SECONDS (ref 11.6–14.5)
QRS AXIS: 45 DEGREES
QRSD INTERVAL: 90 MS
QT INTERVAL: 426 MS
QTC INTERVAL: 518 MS
RBC # BLD AUTO: 2.57 MILLION/UL (ref 3.81–5.12)
RBC # BLD AUTO: 2.83 MILLION/UL (ref 3.81–5.12)
RBC # BLD AUTO: 3.04 MILLION/UL (ref 3.81–5.12)
RBC # BLD AUTO: 3.81 MILLION/UL (ref 3.81–5.12)
RBC # BLD AUTO: 4.59 MILLION/UL (ref 3.81–5.12)
RBC #/AREA URNS AUTO: ABNORMAL /HPF
RH BLD: POSITIVE
RIGHT ATRIAL 2D VOLUME: 13 ML
RIGHT ATRIUM AREA SYSTOLE A4C: 8.7 CM2
RIGHT VENTRICLE ID DIMENSION: 2.6 CM
RSV RNA RESP QL NAA+PROBE: NEGATIVE
SAO2 % BLD FROM PO2: 100 % (ref 60–85)
SAO2 % BLD FROM PO2: 99 % (ref 60–85)
SARS-COV-2 RNA RESP QL NAA+PROBE: NEGATIVE
SL CV LEFT ATRIUM LENGTH A2C: 4 CM
SL CV LV EF: 65
SL CV PED ECHO LEFT VENTRICLE DIASTOLIC VOLUME (MOD BIPLANE) 2D: 82 ML
SL CV PED ECHO LEFT VENTRICLE SYSTOLIC VOLUME (MOD BIPLANE) 2D: 32 ML
SODIUM BLD-SCNC: 148 MMOL/L (ref 136–145)
SODIUM BLD-SCNC: 148 MMOL/L (ref 136–145)
SODIUM BLD-SCNC: 149 MMOL/L (ref 136–145)
SODIUM BLD-SCNC: 149 MMOL/L (ref 136–145)
SODIUM BLD-SCNC: 151 MMOL/L (ref 136–145)
SODIUM SERPL-SCNC: 135 MMOL/L (ref 135–147)
SODIUM SERPL-SCNC: 145 MMOL/L (ref 135–147)
SODIUM SERPL-SCNC: 147 MMOL/L (ref 135–147)
SODIUM SERPL-SCNC: 147 MMOL/L (ref 135–147)
SODIUM SERPL-SCNC: 148 MMOL/L (ref 135–147)
SODIUM SERPL-SCNC: 155 MMOL/L (ref 135–147)
SP GR UR STRIP.AUTO: 1.02 (ref 1–1.03)
SPECIMEN EXPIRATION DATE: NORMAL
SPECIMEN SOURCE: ABNORMAL
T WAVE AXIS: 29 DEGREES
TRICUSPID ANNULAR PLANE SYSTOLIC EXCURSION: 2.3 CM
UNIT DISPENSE STATUS: NORMAL
UNIT PRODUCT CODE: NORMAL
UNIT PRODUCT VOLUME: 260 ML
UNIT PRODUCT VOLUME: 300 ML
UNIT RH: NORMAL
UROBILINOGEN UR STRIP-ACNC: <2 MG/DL
VENT AC: 14
VENT- AC: AC
VENTRICULAR RATE: 89 BPM
VT SETTING VENT: 450 ML
WBC # BLD AUTO: 10.91 THOUSAND/UL (ref 4.31–10.16)
WBC # BLD AUTO: 11.17 THOUSAND/UL (ref 4.31–10.16)
WBC # BLD AUTO: 11.64 THOUSAND/UL (ref 4.31–10.16)
WBC # BLD AUTO: 12.48 THOUSAND/UL (ref 4.31–10.16)
WBC # BLD AUTO: 19.37 THOUSAND/UL (ref 4.31–10.16)
WBC #/AREA URNS AUTO: ABNORMAL /HPF

## 2024-01-01 PROCEDURE — 99285 EMERGENCY DEPT VISIT HI MDM: CPT

## 2024-01-01 PROCEDURE — 99291 CRITICAL CARE FIRST HOUR: CPT | Performed by: STUDENT IN AN ORGANIZED HEALTH CARE EDUCATION/TRAINING PROGRAM

## 2024-01-01 PROCEDURE — 82805 BLOOD GASES W/O2 SATURATION: CPT | Performed by: REGISTERED NURSE

## 2024-01-01 PROCEDURE — 30233N1 TRANSFUSION OF NONAUTOLOGOUS RED BLOOD CELLS INTO PERIPHERAL VEIN, PERCUTANEOUS APPROACH: ICD-10-PCS | Performed by: EMERGENCY MEDICINE

## 2024-01-01 PROCEDURE — 82330 ASSAY OF CALCIUM: CPT | Performed by: PHYSICIAN ASSISTANT

## 2024-01-01 PROCEDURE — 83735 ASSAY OF MAGNESIUM: CPT | Performed by: PHYSICIAN ASSISTANT

## 2024-01-01 PROCEDURE — 84484 ASSAY OF TROPONIN QUANT: CPT | Performed by: EMERGENCY MEDICINE

## 2024-01-01 PROCEDURE — 82805 BLOOD GASES W/O2 SATURATION: CPT

## 2024-01-01 PROCEDURE — 82330 ASSAY OF CALCIUM: CPT

## 2024-01-01 PROCEDURE — 70450 CT HEAD/BRAIN W/O DYE: CPT

## 2024-01-01 PROCEDURE — 36556 INSERT NON-TUNNEL CV CATH: CPT | Performed by: EMERGENCY MEDICINE

## 2024-01-01 PROCEDURE — P9037 PLATE PHERES LEUKOREDU IRRAD: HCPCS

## 2024-01-01 PROCEDURE — 84484 ASSAY OF TROPONIN QUANT: CPT | Performed by: REGISTERED NURSE

## 2024-01-01 PROCEDURE — 85610 PROTHROMBIN TIME: CPT | Performed by: PHYSICIAN ASSISTANT

## 2024-01-01 PROCEDURE — 80053 COMPREHEN METABOLIC PANEL: CPT

## 2024-01-01 PROCEDURE — 86900 BLOOD TYPING SEROLOGIC ABO: CPT | Performed by: NURSE ANESTHETIST, CERTIFIED REGISTERED

## 2024-01-01 PROCEDURE — 85014 HEMATOCRIT: CPT

## 2024-01-01 PROCEDURE — 80048 BASIC METABOLIC PNL TOTAL CA: CPT | Performed by: PHYSICIAN ASSISTANT

## 2024-01-01 PROCEDURE — 82803 BLOOD GASES ANY COMBINATION: CPT

## 2024-01-01 PROCEDURE — 84100 ASSAY OF PHOSPHORUS: CPT | Performed by: PHYSICIAN ASSISTANT

## 2024-01-01 PROCEDURE — 85025 COMPLETE CBC W/AUTO DIFF WBC: CPT | Performed by: PHYSICIAN ASSISTANT

## 2024-01-01 PROCEDURE — 99291 CRITICAL CARE FIRST HOUR: CPT | Performed by: EMERGENCY MEDICINE

## 2024-01-01 PROCEDURE — C1894 INTRO/SHEATH, NON-LASER: HCPCS | Performed by: INTERNAL MEDICINE

## 2024-01-01 PROCEDURE — 86923 COMPATIBILITY TEST ELECTRIC: CPT

## 2024-01-01 PROCEDURE — 84295 ASSAY OF SERUM SODIUM: CPT

## 2024-01-01 PROCEDURE — 06H033Z INSERTION OF INFUSION DEVICE INTO INFERIOR VENA CAVA, PERCUTANEOUS APPROACH: ICD-10-PCS | Performed by: EMERGENCY MEDICINE

## 2024-01-01 PROCEDURE — 83930 ASSAY OF BLOOD OSMOLALITY: CPT | Performed by: PHYSICIAN ASSISTANT

## 2024-01-01 PROCEDURE — 82947 ASSAY GLUCOSE BLOOD QUANT: CPT

## 2024-01-01 PROCEDURE — 93005 ELECTROCARDIOGRAM TRACING: CPT

## 2024-01-01 PROCEDURE — 94760 N-INVAS EAR/PLS OXIMETRY 1: CPT

## 2024-01-01 PROCEDURE — 87070 CULTURE OTHR SPECIMN AEROBIC: CPT | Performed by: REGISTERED NURSE

## 2024-01-01 PROCEDURE — 80053 COMPREHEN METABOLIC PANEL: CPT | Performed by: REGISTERED NURSE

## 2024-01-01 PROCEDURE — 81001 URINALYSIS AUTO W/SCOPE: CPT | Performed by: REGISTERED NURSE

## 2024-01-01 PROCEDURE — 85730 THROMBOPLASTIN TIME PARTIAL: CPT | Performed by: NURSE ANESTHETIST, CERTIFIED REGISTERED

## 2024-01-01 PROCEDURE — 00C00ZZ EXTIRPATION OF MATTER FROM BRAIN, OPEN APPROACH: ICD-10-PCS | Performed by: NEUROLOGICAL SURGERY

## 2024-01-01 PROCEDURE — 82948 REAGENT STRIP/BLOOD GLUCOSE: CPT

## 2024-01-01 PROCEDURE — 93306 TTE W/DOPPLER COMPLETE: CPT

## 2024-01-01 PROCEDURE — 0241U HB NFCT DS VIR RESP RNA 4 TRGT: CPT | Performed by: EMERGENCY MEDICINE

## 2024-01-01 PROCEDURE — 96375 TX/PRO/DX INJ NEW DRUG ADDON: CPT

## 2024-01-01 PROCEDURE — 85610 PROTHROMBIN TIME: CPT | Performed by: NURSE ANESTHETIST, CERTIFIED REGISTERED

## 2024-01-01 PROCEDURE — 71045 X-RAY EXAM CHEST 1 VIEW: CPT

## 2024-01-01 PROCEDURE — 94002 VENT MGMT INPAT INIT DAY: CPT

## 2024-01-01 PROCEDURE — C1781 MESH (IMPLANTABLE): HCPCS | Performed by: NEUROLOGICAL SURGERY

## 2024-01-01 PROCEDURE — C1765 ADHESION BARRIER: HCPCS | Performed by: NEUROLOGICAL SURGERY

## 2024-01-01 PROCEDURE — 86850 RBC ANTIBODY SCREEN: CPT | Performed by: NURSE ANESTHETIST, CERTIFIED REGISTERED

## 2024-01-01 PROCEDURE — 31500 INSERT EMERGENCY AIRWAY: CPT | Performed by: EMERGENCY MEDICINE

## 2024-01-01 PROCEDURE — 80048 BASIC METABOLIC PNL TOTAL CA: CPT | Performed by: EMERGENCY MEDICINE

## 2024-01-01 PROCEDURE — 83605 ASSAY OF LACTIC ACID: CPT | Performed by: PHYSICIAN ASSISTANT

## 2024-01-01 PROCEDURE — 84702 CHORIONIC GONADOTROPIN TEST: CPT | Performed by: REGISTERED NURSE

## 2024-01-01 PROCEDURE — 83930 ASSAY OF BLOOD OSMOLALITY: CPT | Performed by: REGISTERED NURSE

## 2024-01-01 PROCEDURE — 84132 ASSAY OF SERUM POTASSIUM: CPT

## 2024-01-01 PROCEDURE — 71250 CT THORAX DX C-: CPT

## 2024-01-01 PROCEDURE — 82805 BLOOD GASES W/O2 SATURATION: CPT | Performed by: PHYSICIAN ASSISTANT

## 2024-01-01 PROCEDURE — 93455 CORONARY ART/GRFT ANGIO S&I: CPT | Performed by: INTERNAL MEDICINE

## 2024-01-01 PROCEDURE — NC001 PR NO CHARGE: Performed by: REGISTERED NURSE

## 2024-01-01 PROCEDURE — 30233R1 TRANSFUSION OF NONAUTOLOGOUS PLATELETS INTO PERIPHERAL VEIN, PERCUTANEOUS APPROACH: ICD-10-PCS | Performed by: EMERGENCY MEDICINE

## 2024-01-01 PROCEDURE — 94003 VENT MGMT INPAT SUBQ DAY: CPT

## 2024-01-01 PROCEDURE — 85610 PROTHROMBIN TIME: CPT

## 2024-01-01 PROCEDURE — C1769 GUIDE WIRE: HCPCS | Performed by: INTERNAL MEDICINE

## 2024-01-01 PROCEDURE — 99232 SBSQ HOSP IP/OBS MODERATE 35: CPT | Performed by: STUDENT IN AN ORGANIZED HEALTH CARE EDUCATION/TRAINING PROGRAM

## 2024-01-01 PROCEDURE — 85576 BLOOD PLATELET AGGREGATION: CPT | Performed by: PHYSICIAN ASSISTANT

## 2024-01-01 PROCEDURE — 85397 CLOTTING FUNCT ACTIVITY: CPT | Performed by: PHYSICIAN ASSISTANT

## 2024-01-01 PROCEDURE — 85730 THROMBOPLASTIN TIME PARTIAL: CPT | Performed by: EMERGENCY MEDICINE

## 2024-01-01 PROCEDURE — 85576 BLOOD PLATELET AGGREGATION: CPT | Performed by: NURSE ANESTHETIST, CERTIFIED REGISTERED

## 2024-01-01 PROCEDURE — 83036 HEMOGLOBIN GLYCOSYLATED A1C: CPT | Performed by: REGISTERED NURSE

## 2024-01-01 PROCEDURE — 85025 COMPLETE CBC W/AUTO DIFF WBC: CPT

## 2024-01-01 PROCEDURE — P9016 RBC LEUKOCYTES REDUCED: HCPCS

## 2024-01-01 PROCEDURE — 80076 HEPATIC FUNCTION PANEL: CPT | Performed by: PHYSICIAN ASSISTANT

## 2024-01-01 PROCEDURE — 85610 PROTHROMBIN TIME: CPT | Performed by: EMERGENCY MEDICINE

## 2024-01-01 PROCEDURE — 80048 BASIC METABOLIC PNL TOTAL CA: CPT | Performed by: NURSE ANESTHETIST, CERTIFIED REGISTERED

## 2024-01-01 PROCEDURE — NC001 PR NO CHARGE: Performed by: EMERGENCY MEDICINE

## 2024-01-01 PROCEDURE — 86901 BLOOD TYPING SEROLOGIC RH(D): CPT | Performed by: NURSE ANESTHETIST, CERTIFIED REGISTERED

## 2024-01-01 PROCEDURE — 76512 OPH US DX B-SCAN: CPT | Performed by: EMERGENCY MEDICINE

## 2024-01-01 PROCEDURE — 5A1945Z RESPIRATORY VENTILATION, 24-96 CONSECUTIVE HOURS: ICD-10-PCS | Performed by: EMERGENCY MEDICINE

## 2024-01-01 PROCEDURE — 36415 COLL VENOUS BLD VENIPUNCTURE: CPT | Performed by: EMERGENCY MEDICINE

## 2024-01-01 PROCEDURE — 85730 THROMBOPLASTIN TIME PARTIAL: CPT | Performed by: PHYSICIAN ASSISTANT

## 2024-01-01 PROCEDURE — 96368 THER/DIAG CONCURRENT INF: CPT

## 2024-01-01 PROCEDURE — 84100 ASSAY OF PHOSPHORUS: CPT

## 2024-01-01 PROCEDURE — 93454 CORONARY ARTERY ANGIO S&I: CPT | Performed by: INTERNAL MEDICINE

## 2024-01-01 PROCEDURE — 96365 THER/PROPH/DIAG IV INF INIT: CPT

## 2024-01-01 PROCEDURE — 85730 THROMBOPLASTIN TIME PARTIAL: CPT

## 2024-01-01 PROCEDURE — 85027 COMPLETE CBC AUTOMATED: CPT | Performed by: EMERGENCY MEDICINE

## 2024-01-01 PROCEDURE — P9035 PLATELET PHERES LEUKOREDUCED: HCPCS

## 2024-01-01 PROCEDURE — 85025 COMPLETE CBC W/AUTO DIFF WBC: CPT | Performed by: NURSE ANESTHETIST, CERTIFIED REGISTERED

## 2024-01-01 PROCEDURE — 83735 ASSAY OF MAGNESIUM: CPT

## 2024-01-01 PROCEDURE — 93306 TTE W/DOPPLER COMPLETE: CPT | Performed by: INTERNAL MEDICINE

## 2024-01-01 PROCEDURE — 87205 SMEAR GRAM STAIN: CPT | Performed by: REGISTERED NURSE

## 2024-01-01 DEVICE — DURAGEN® PLUS DURAL REGENERATION MATRIX, 5 IN X 7 IN (12.5 CM X 17.5 CM)
Type: IMPLANTABLE DEVICE | Site: BRAIN | Status: FUNCTIONAL
Brand: DURAGEN® PLUS

## 2024-01-01 RX ORDER — MAGNESIUM HYDROXIDE 1200 MG/15ML
LIQUID ORAL AS NEEDED
Status: DISCONTINUED | OUTPATIENT
Start: 2024-01-01 | End: 2024-01-01 | Stop reason: HOSPADM

## 2024-01-01 RX ORDER — ALBUMIN, HUMAN INJ 5% 5 %
25 SOLUTION INTRAVENOUS ONCE
Status: COMPLETED | OUTPATIENT
Start: 2024-01-01 | End: 2024-01-01

## 2024-01-01 RX ORDER — MIDAZOLAM HYDROCHLORIDE 2 MG/2ML
4 INJECTION, SOLUTION INTRAMUSCULAR; INTRAVENOUS ONCE
Status: DISCONTINUED | OUTPATIENT
Start: 2024-01-01 | End: 2024-05-12

## 2024-01-01 RX ORDER — SODIUM CHLORIDE 450 MG/100ML
150 INJECTION, SOLUTION INTRAVENOUS CONTINUOUS
Status: DISCONTINUED | OUTPATIENT
Start: 2024-01-01 | End: 2024-05-13

## 2024-01-01 RX ORDER — CALCIUM CHLORIDE 100 MG/ML
INJECTION INTRAVENOUS; INTRAVENTRICULAR AS NEEDED
Status: DISCONTINUED | OUTPATIENT
Start: 2024-01-01 | End: 2024-01-01

## 2024-01-01 RX ORDER — LEVETIRACETAM 500 MG/5ML
INJECTION, SOLUTION, CONCENTRATE INTRAVENOUS AS NEEDED
Status: DISCONTINUED | OUTPATIENT
Start: 2024-01-01 | End: 2024-01-01

## 2024-01-01 RX ORDER — POLYETHYLENE GLYCOL 3350 17 G/17G
17 POWDER, FOR SOLUTION ORAL DAILY
Status: DISCONTINUED | OUTPATIENT
Start: 2024-01-01 | End: 2024-05-13 | Stop reason: HOSPADM

## 2024-01-01 RX ORDER — ETOMIDATE 2 MG/ML
INJECTION INTRAVENOUS CODE/TRAUMA/SEDATION MEDICATION
Status: COMPLETED | OUTPATIENT
Start: 2024-01-01 | End: 2024-01-01

## 2024-01-01 RX ORDER — 3% SODIUM CHLORIDE 3 G/100ML
50 INJECTION, SOLUTION INTRAVENOUS CONTINUOUS
Status: DISCONTINUED | OUTPATIENT
Start: 2024-01-01 | End: 2024-01-01

## 2024-01-01 RX ORDER — VASOPRESSIN 20 U/ML
1 INJECTION PARENTERAL ONCE
Status: COMPLETED | OUTPATIENT
Start: 2024-01-01 | End: 2024-01-01

## 2024-01-01 RX ORDER — SODIUM CHLORIDE, SODIUM GLUCONATE, SODIUM ACETATE, POTASSIUM CHLORIDE, MAGNESIUM CHLORIDE, SODIUM PHOSPHATE, DIBASIC, AND POTASSIUM PHOSPHATE .53; .5; .37; .037; .03; .012; .00082 G/100ML; G/100ML; G/100ML; G/100ML; G/100ML; G/100ML; G/100ML
1000 INJECTION, SOLUTION INTRAVENOUS ONCE
Status: COMPLETED | OUTPATIENT
Start: 2024-01-01 | End: 2024-01-01

## 2024-01-01 RX ORDER — FENTANYL CITRATE 50 UG/ML
100 INJECTION, SOLUTION INTRAMUSCULAR; INTRAVENOUS ONCE
Status: DISCONTINUED | OUTPATIENT
Start: 2024-01-01 | End: 2024-05-12

## 2024-01-01 RX ORDER — LEVOTHYROXINE SODIUM ANHYDROUS 100 UG/5ML
20 INJECTION, POWDER, LYOPHILIZED, FOR SOLUTION INTRAVENOUS ONCE
Status: COMPLETED | OUTPATIENT
Start: 2024-01-01 | End: 2024-01-01

## 2024-01-01 RX ORDER — DEXTROSE MONOHYDRATE 25 G/50ML
50 INJECTION, SOLUTION INTRAVENOUS ONCE
Status: COMPLETED | OUTPATIENT
Start: 2024-01-01 | End: 2024-01-01

## 2024-01-01 RX ORDER — FENTANYL CITRATE 50 UG/ML
50 INJECTION, SOLUTION INTRAMUSCULAR; INTRAVENOUS ONCE
Status: COMPLETED | OUTPATIENT
Start: 2024-01-01 | End: 2024-01-01

## 2024-01-01 RX ORDER — CHLORHEXIDINE GLUCONATE ORAL RINSE 1.2 MG/ML
15 SOLUTION DENTAL ONCE
Status: CANCELLED | OUTPATIENT
Start: 2024-01-01 | End: 2024-01-01

## 2024-01-01 RX ORDER — SODIUM CHLORIDE 9 MG/ML
INJECTION, SOLUTION INTRAVENOUS CONTINUOUS PRN
Status: DISCONTINUED | OUTPATIENT
Start: 2024-01-01 | End: 2024-01-01

## 2024-01-01 RX ORDER — LEVETIRACETAM 500 MG/5ML
500 INJECTION, SOLUTION, CONCENTRATE INTRAVENOUS EVERY 12 HOURS SCHEDULED
Status: DISCONTINUED | OUTPATIENT
Start: 2024-01-01 | End: 2024-01-01

## 2024-01-01 RX ORDER — CEFAZOLIN SODIUM 2 G/50ML
SOLUTION INTRAVENOUS AS NEEDED
Status: DISCONTINUED | OUTPATIENT
Start: 2024-01-01 | End: 2024-01-01

## 2024-01-01 RX ORDER — ACETAMINOPHEN 325 MG/1
975 TABLET ORAL EVERY 6 HOURS PRN
Status: CANCELLED | OUTPATIENT
Start: 2024-01-01

## 2024-01-01 RX ORDER — SODIUM CHLORIDE 9 MG/ML
75 INJECTION, SOLUTION INTRAVENOUS CONTINUOUS
Status: DISCONTINUED | OUTPATIENT
Start: 2024-01-01 | End: 2024-01-01

## 2024-01-01 RX ORDER — BISACODYL 10 MG
10 SUPPOSITORY, RECTAL RECTAL DAILY PRN
Status: DISCONTINUED | OUTPATIENT
Start: 2024-01-01 | End: 2024-05-13 | Stop reason: HOSPADM

## 2024-01-01 RX ORDER — POTASSIUM CHLORIDE 14.9 MG/ML
INJECTION INTRAVENOUS CONTINUOUS PRN
Status: DISCONTINUED | OUTPATIENT
Start: 2024-01-01 | End: 2024-01-01

## 2024-01-01 RX ORDER — HYDROMORPHONE HCL IN WATER/PF 6 MG/30 ML
0.2 PATIENT CONTROLLED ANALGESIA SYRINGE INTRAVENOUS EVERY 4 HOURS PRN
Status: DISCONTINUED | OUTPATIENT
Start: 2024-01-01 | End: 2024-01-01

## 2024-01-01 RX ORDER — CEFAZOLIN SODIUM 2 G/50ML
2000 SOLUTION INTRAVENOUS EVERY 8 HOURS
Status: DISCONTINUED | OUTPATIENT
Start: 2024-01-01 | End: 2024-05-13 | Stop reason: HOSPADM

## 2024-01-01 RX ORDER — DOCUSATE SODIUM 100 MG/1
100 CAPSULE, LIQUID FILLED ORAL 2 TIMES DAILY
Status: DISCONTINUED | OUTPATIENT
Start: 2024-01-01 | End: 2024-01-01

## 2024-01-01 RX ORDER — ROCURONIUM BROMIDE 10 MG/ML
INJECTION, SOLUTION INTRAVENOUS AS NEEDED
Status: DISCONTINUED | OUTPATIENT
Start: 2024-01-01 | End: 2024-01-01

## 2024-01-01 RX ORDER — ONDANSETRON 2 MG/ML
4 INJECTION INTRAMUSCULAR; INTRAVENOUS EVERY 4 HOURS PRN
Status: DISCONTINUED | OUTPATIENT
Start: 2024-01-01 | End: 2024-01-01

## 2024-01-01 RX ORDER — LIDOCAINE HYDROCHLORIDE AND EPINEPHRINE 10; 10 MG/ML; UG/ML
INJECTION, SOLUTION INFILTRATION; PERINEURAL AS NEEDED
Status: DISCONTINUED | OUTPATIENT
Start: 2024-01-01 | End: 2024-01-01 | Stop reason: HOSPADM

## 2024-01-01 RX ORDER — SENNOSIDES 8.6 MG
1 TABLET ORAL DAILY
Status: DISCONTINUED | OUTPATIENT
Start: 2024-01-01 | End: 2024-05-13 | Stop reason: HOSPADM

## 2024-01-01 RX ORDER — ACETAMINOPHEN 325 MG/1
975 TABLET ORAL EVERY 8 HOURS SCHEDULED
Status: DISCONTINUED | OUTPATIENT
Start: 2024-01-01 | End: 2024-05-12

## 2024-01-01 RX ORDER — CEFAZOLIN SODIUM 2 G/50ML
2000 SOLUTION INTRAVENOUS EVERY 8 HOURS
Qty: 150 ML | Refills: 0 | Status: COMPLETED | OUTPATIENT
Start: 2024-01-01 | End: 2024-01-01

## 2024-01-01 RX ORDER — ALBUTEROL SULFATE 2.5 MG/3ML
SOLUTION RESPIRATORY (INHALATION)
Status: COMPLETED
Start: 2024-01-01 | End: 2024-05-12

## 2024-01-01 RX ORDER — CHLORHEXIDINE GLUCONATE ORAL RINSE 1.2 MG/ML
15 SOLUTION DENTAL EVERY 12 HOURS SCHEDULED
Status: DISCONTINUED | OUTPATIENT
Start: 2024-01-01 | End: 2024-05-13 | Stop reason: HOSPADM

## 2024-01-01 RX ORDER — LEVETIRACETAM 500 MG/5ML
500 INJECTION, SOLUTION, CONCENTRATE INTRAVENOUS EVERY 12 HOURS SCHEDULED
Status: CANCELLED | OUTPATIENT
Start: 2024-01-01

## 2024-01-01 RX ORDER — PROPOFOL 10 MG/ML
5-50 INJECTION, EMULSION INTRAVENOUS
Status: DISCONTINUED | OUTPATIENT
Start: 2024-01-01 | End: 2024-01-01 | Stop reason: HOSPADM

## 2024-01-01 RX ORDER — GINSENG 100 MG
CAPSULE ORAL AS NEEDED
Status: DISCONTINUED | OUTPATIENT
Start: 2024-01-01 | End: 2024-01-01 | Stop reason: HOSPADM

## 2024-01-01 RX ORDER — MAGNESIUM SULFATE HEPTAHYDRATE 40 MG/ML
2 INJECTION, SOLUTION INTRAVENOUS ONCE
Status: COMPLETED | OUTPATIENT
Start: 2024-01-01 | End: 2024-01-01

## 2024-01-01 RX ORDER — SUCCINYLCHOLINE/SOD CL,ISO/PF 100 MG/5ML
SYRINGE (ML) INTRAVENOUS CODE/TRAUMA/SEDATION MEDICATION
Status: COMPLETED | OUTPATIENT
Start: 2024-01-01 | End: 2024-01-01

## 2024-01-01 RX ORDER — OXYCODONE HYDROCHLORIDE 5 MG/1
5 TABLET ORAL EVERY 4 HOURS PRN
Status: DISCONTINUED | OUTPATIENT
Start: 2024-01-01 | End: 2024-01-01

## 2024-01-01 RX ORDER — DESMOPRESSIN ACETATE 4 UG/ML
4 INJECTION, SOLUTION INTRAVENOUS; SUBCUTANEOUS ONCE
Status: DISCONTINUED | OUTPATIENT
Start: 2024-01-01 | End: 2024-01-01

## 2024-01-01 RX ORDER — HYDRALAZINE HYDROCHLORIDE 20 MG/ML
10 INJECTION INTRAMUSCULAR; INTRAVENOUS EVERY 6 HOURS PRN
Status: DISCONTINUED | OUTPATIENT
Start: 2024-01-01 | End: 2024-01-01

## 2024-01-01 RX ORDER — CALCIUM GLUCONATE 20 MG/ML
2 INJECTION, SOLUTION INTRAVENOUS ONCE
Status: COMPLETED | OUTPATIENT
Start: 2024-01-01 | End: 2024-01-01

## 2024-01-01 RX ORDER — LEVETIRACETAM 500 MG/1
500 TABLET ORAL EVERY 12 HOURS SCHEDULED
Status: DISCONTINUED | OUTPATIENT
Start: 2024-01-01 | End: 2024-01-01

## 2024-01-01 RX ORDER — PROPOFOL 10 MG/ML
5-50 INJECTION, EMULSION INTRAVENOUS
Status: DISCONTINUED | OUTPATIENT
Start: 2024-01-01 | End: 2024-01-01

## 2024-01-01 RX ORDER — SODIUM CHLORIDE 234 MG/ML
30 INJECTION, SOLUTION INTRAVENOUS ONCE
Qty: 30 ML | Refills: 0 | Status: COMPLETED | OUTPATIENT
Start: 2024-01-01 | End: 2024-01-01

## 2024-01-01 RX ADMIN — PROPOFOL 20 MCG/KG/MIN: 10 INJECTION, EMULSION INTRAVENOUS at 12:44

## 2024-01-01 RX ADMIN — SODIUM CHLORIDE, SODIUM GLUCONATE, SODIUM ACETATE, POTASSIUM CHLORIDE, MAGNESIUM CHLORIDE, SODIUM PHOSPHATE, DIBASIC, AND POTASSIUM PHOSPHATE 1000 ML: .53; .5; .37; .037; .03; .012; .00082 INJECTION, SOLUTION INTRAVENOUS at 16:55

## 2024-01-01 RX ADMIN — ACETAMINOPHEN 975 MG: 325 TABLET, FILM COATED ORAL at 05:37

## 2024-01-01 RX ADMIN — SODIUM CHLORIDE, SODIUM GLUCONATE, SODIUM ACETATE, POTASSIUM CHLORIDE, MAGNESIUM CHLORIDE, SODIUM PHOSPHATE, DIBASIC, AND POTASSIUM PHOSPHATE 1000 ML: .53; .5; .37; .037; .03; .012; .00082 INJECTION, SOLUTION INTRAVENOUS at 15:52

## 2024-01-01 RX ADMIN — SODIUM CHLORIDE 75 ML/HR: 0.9 INJECTION, SOLUTION INTRAVENOUS at 13:50

## 2024-01-01 RX ADMIN — IOHEXOL 85 ML: 350 INJECTION, SOLUTION INTRAVENOUS at 07:18

## 2024-01-01 RX ADMIN — HYDROCORTISONE SODIUM SUCCINATE 50 MG: 100 INJECTION, POWDER, FOR SOLUTION INTRAMUSCULAR; INTRAVENOUS at 17:50

## 2024-01-01 RX ADMIN — FENTANYL CITRATE 50 MCG: 50 INJECTION INTRAMUSCULAR; INTRAVENOUS at 08:02

## 2024-01-01 RX ADMIN — VASOPRESSIN 1 UNITS: 20 INJECTION INTRAVENOUS at 21:00

## 2024-01-01 RX ADMIN — HYDROCORTISONE SODIUM SUCCINATE 50 MG: 100 INJECTION, POWDER, FOR SOLUTION INTRAMUSCULAR; INTRAVENOUS at 05:37

## 2024-01-01 RX ADMIN — SODIUM CHLORIDE 50 ML/HR: 3 INJECTION, SOLUTION INTRAVENOUS at 05:30

## 2024-01-01 RX ADMIN — CALCIUM CHLORIDE 0.5 G: 100 INJECTION INTRAVENOUS; INTRAVENTRICULAR at 09:20

## 2024-01-01 RX ADMIN — CEFTAZIDIME 2000 MG: 1 INJECTION, POWDER, FOR SOLUTION INTRAMUSCULAR; INTRAVENOUS at 21:28

## 2024-01-01 RX ADMIN — CEFAZOLIN SODIUM 2000 MG: 2 SOLUTION INTRAVENOUS at 17:50

## 2024-01-01 RX ADMIN — CEFAZOLIN SODIUM 2000 MG: 2 SOLUTION INTRAVENOUS at 08:51

## 2024-01-01 RX ADMIN — NOREPINEPHRINE BITARTRATE 4 MCG/MIN: 1 INJECTION, SOLUTION, CONCENTRATE INTRAVENOUS at 13:50

## 2024-01-01 RX ADMIN — CHLORHEXIDINE GLUCONATE 0.12% ORAL RINSE 15 ML: 1.2 LIQUID ORAL at 08:40

## 2024-01-01 RX ADMIN — VASOPRESSIN 0.03 UNITS/MIN: 20 INJECTION INTRAVENOUS at 14:48

## 2024-01-01 RX ADMIN — ACETAMINOPHEN 975 MG: 325 TABLET, FILM COATED ORAL at 15:54

## 2024-01-01 RX ADMIN — CEFAZOLIN SODIUM 2000 MG: 2 SOLUTION INTRAVENOUS at 08:40

## 2024-01-01 RX ADMIN — PHENYLEPHRINE HYDROCHLORIDE 50 MCG/MIN: 10 INJECTION INTRAVENOUS at 09:21

## 2024-01-01 RX ADMIN — HYDROCORTISONE SODIUM SUCCINATE 50 MG: 100 INJECTION, POWDER, FOR SOLUTION INTRAMUSCULAR; INTRAVENOUS at 18:16

## 2024-01-01 RX ADMIN — HYDROCORTISONE SODIUM SUCCINATE 50 MG: 100 INJECTION, POWDER, FOR SOLUTION INTRAMUSCULAR; INTRAVENOUS at 12:01

## 2024-01-01 RX ADMIN — CHLORHEXIDINE GLUCONATE 0.12% ORAL RINSE 15 ML: 1.2 LIQUID ORAL at 21:34

## 2024-01-01 RX ADMIN — ROCURONIUM BROMIDE 30 MG: 10 INJECTION, SOLUTION INTRAVENOUS at 11:41

## 2024-01-01 RX ADMIN — LEVETIRACETAM 500 MG: 100 INJECTION, SOLUTION INTRAVENOUS at 08:40

## 2024-01-01 RX ADMIN — NICARDIPINE HYDROCHLORIDE 1 MG/HR: 2.5 INJECTION, SOLUTION INTRAVENOUS at 07:38

## 2024-01-01 RX ADMIN — SODIUM CHLORIDE: 9 INJECTION, SOLUTION INTRAVENOUS at 08:52

## 2024-01-01 RX ADMIN — VASOPRESSIN 0.03 UNITS/MIN: 20 INJECTION INTRAVENOUS at 18:11

## 2024-01-01 RX ADMIN — LEVETIRACETAM 500 MG: 100 INJECTION, SOLUTION INTRAVENOUS at 21:34

## 2024-01-01 RX ADMIN — SODIUM CHLORIDE 30 ML: 4 INJECTION, SOLUTION, CONCENTRATE INTRAVENOUS at 14:05

## 2024-01-01 RX ADMIN — HYDROCORTISONE SODIUM SUCCINATE 50 MG: 100 INJECTION, POWDER, FOR SOLUTION INTRAMUSCULAR; INTRAVENOUS at 23:07

## 2024-01-01 RX ADMIN — LEVOTHYROXINE SODIUM ANHYDROUS 20 MCG/HR: 100 INJECTION, POWDER, LYOPHILIZED, FOR SOLUTION INTRAVENOUS at 21:09

## 2024-01-01 RX ADMIN — SODIUM CHLORIDE 30 ML/HR: 3 INJECTION, SOLUTION INTRAVENOUS at 18:14

## 2024-01-01 RX ADMIN — ROCURONIUM BROMIDE 50 MG: 10 INJECTION, SOLUTION INTRAVENOUS at 08:51

## 2024-01-01 RX ADMIN — CEFAZOLIN SODIUM 2000 MG: 2 SOLUTION INTRAVENOUS at 22:08

## 2024-01-01 RX ADMIN — INSULIN HUMAN 6 UNITS: 100 INJECTION, SOLUTION PARENTERAL at 10:07

## 2024-01-01 RX ADMIN — POTASSIUM CHLORIDE: 14.9 INJECTION, SOLUTION INTRAVENOUS at 09:26

## 2024-01-01 RX ADMIN — PHENYLEPHRINE HYDROCHLORIDE 50 MCG/MIN: 50 INJECTION INTRAVENOUS at 19:43

## 2024-01-01 RX ADMIN — NOREPINEPHRINE BITARTRATE 7 MCG/MIN: 1 INJECTION, SOLUTION, CONCENTRATE INTRAVENOUS at 01:29

## 2024-01-01 RX ADMIN — CEFAZOLIN SODIUM 2000 MG: 2 SOLUTION INTRAVENOUS at 00:14

## 2024-01-01 RX ADMIN — ETOMIDATE 20 MG: 2 INJECTION, SOLUTION INTRAVENOUS at 07:24

## 2024-01-01 RX ADMIN — CALCIUM CHLORIDE 0.5 G: 100 INJECTION INTRAVENOUS; INTRAVENTRICULAR at 09:23

## 2024-01-01 RX ADMIN — NOREPINEPHRINE BITARTRATE 4 MCG/MIN: 1 INJECTION, SOLUTION, CONCENTRATE INTRAVENOUS at 14:10

## 2024-01-01 RX ADMIN — VASOPRESSIN 0.03 UNITS/MIN: 20 INJECTION INTRAVENOUS at 03:40

## 2024-01-01 RX ADMIN — Medication 100 MG: at 07:24

## 2024-01-01 RX ADMIN — ALBUMIN (HUMAN) 25 G: 12.5 INJECTION, SOLUTION INTRAVENOUS at 21:54

## 2024-01-01 RX ADMIN — ACETAMINOPHEN 975 MG: 325 TABLET, FILM COATED ORAL at 21:34

## 2024-01-01 RX ADMIN — PROPOFOL 10 MCG/KG/MIN: 10 INJECTION, EMULSION INTRAVENOUS at 07:42

## 2024-01-01 RX ADMIN — LEVOTHYROXINE SODIUM ANHYDROUS 20 MCG: 100 INJECTION, POWDER, LYOPHILIZED, FOR SOLUTION INTRAVENOUS at 20:58

## 2024-01-01 RX ADMIN — DEXTROSE MONOHYDRATE 50 ML: 25 INJECTION, SOLUTION INTRAVENOUS at 21:04

## 2024-01-01 RX ADMIN — SODIUM CHLORIDE: 0.9 INJECTION, SOLUTION INTRAVENOUS at 08:50

## 2024-01-01 RX ADMIN — MAGNESIUM SULFATE HEPTAHYDRATE 2 G: 40 INJECTION, SOLUTION INTRAVENOUS at 15:52

## 2024-01-01 RX ADMIN — SODIUM CHLORIDE 100 ML/HR: 0.45 INJECTION, SOLUTION INTRAVENOUS at 21:20

## 2024-01-01 RX ADMIN — DESMOPRESSIN ACETATE 35.2 MCG: 4 INJECTION INTRAVENOUS; SUBCUTANEOUS at 08:04

## 2024-01-01 RX ADMIN — SODIUM CHLORIDE: 9 INJECTION, SOLUTION INTRAVENOUS at 12:19

## 2024-01-01 RX ADMIN — CHLORHEXIDINE GLUCONATE 0.12% ORAL RINSE 15 ML: 1.2 LIQUID ORAL at 23:51

## 2024-01-01 RX ADMIN — CALCIUM GLUCONATE 2 G: 20 INJECTION, SOLUTION INTRAVENOUS at 18:19

## 2024-01-01 RX ADMIN — INSULIN HUMAN 20 UNITS: 100 INJECTION, SOLUTION PARENTERAL at 21:01

## 2024-01-01 RX ADMIN — HYDROCORTISONE SODIUM SUCCINATE 50 MG: 100 INJECTION, POWDER, FOR SOLUTION INTRAMUSCULAR; INTRAVENOUS at 23:56

## 2024-01-01 RX ADMIN — METHYLPREDNISOLONE SODIUM SUCCINATE 2000 MG: 1 INJECTION, POWDER, FOR SOLUTION INTRAMUSCULAR; INTRAVENOUS at 20:45

## 2024-01-01 RX ADMIN — VASOPRESSIN 0.04 UNITS/MIN: 20 INJECTION INTRAVENOUS at 23:33

## 2024-01-01 RX ADMIN — NOREPINEPHRINE BITARTRATE 4 MCG/MIN: 1 INJECTION, SOLUTION, CONCENTRATE INTRAVENOUS at 11:23

## 2024-01-01 RX ADMIN — INSULIN HUMAN 6 UNITS: 100 INJECTION, SOLUTION PARENTERAL at 11:07

## 2024-01-01 RX ADMIN — LEVETIRACETAM 1000 MG: 100 INJECTION, SOLUTION INTRAVENOUS at 08:59

## 2024-01-02 ENCOUNTER — TELEPHONE (OUTPATIENT)
Dept: RADIOLOGY | Facility: HOSPITAL | Age: 54
End: 2024-01-02

## 2024-01-02 NOTE — PRE-PROCEDURE INSTRUCTIONS
Pre-procedure Instructions for Interventional Radiology  [address] [address]  INTERVENTIONAL RADIOLOGY [phone]    You are scheduled for a/an cerebral angiogram.    On Monday 1-8-24.    Your tentative arrival time is 0800.  [facility] will notify you the day before your procedure with the exact arrival time and the location to arrive.    To prepare for your procedure:  Please arrange for someone to drive you home after the procedure and stay with you until the next morning if you are instructed to do so.  This is typically for patients receiving some type of sedative or anesthetic for the procedure.  DO NOT EAT OR DRINK ANYTHING after midnight on the evening before your procedure including candy & gum.  ONLY SIPS OF WATER with your medications are allowed on the morning of your procedure.  TAKE ALL OF YOUR REGULAR MEDICATIONS THE MORNING OF YOUR PROCEDURE with sips of water!  We may call you to stop some of your blood sugar, blood pressure and blood thinning medications depending on the procedure.  Please take all of these medications unless we instruct you to stop them.  If you have an allergy to x-ray dye, please contact Interventional Radiology for an x-ray dye preparation which usually consists of an oral steroid and Benadryl.    The day of your procedure:  Bring a list of the medications you take at home.  Bring medications you take for breathing problems (such as inhalers), medications for chest pain, or both.  Bring a case for your glasses or contacts.  Bring your insurance card and a form of photo ID.  Please leave all valuables such as credit cards and jewelry at home.  Report to the registration desk in the main lobby at the [facility], Entrance B.  Ask to be directed to [facility].  While your procedure is being performed, your family may wait in the Radiology Waiting Room on the 1st floor in Radiology.  if they need to leave, they may provide  a number to be called following the procedure.   Be prepared to stay overnight just in case. Sometimes procedures will indicate the need for further observation or treatment.   If you are scheduled for a follow-up visit with the Interventional Radiologist after your procedure, you will be called with a date and time.    Special Instructions (Medications to stop taking before your procedure etc.):

## 2024-01-08 ENCOUNTER — ANESTHESIA EVENT (OUTPATIENT)
Dept: RADIOLOGY | Facility: HOSPITAL | Age: 54
End: 2024-01-08

## 2024-01-08 ENCOUNTER — ANESTHESIA (OUTPATIENT)
Dept: RADIOLOGY | Facility: HOSPITAL | Age: 54
End: 2024-01-08

## 2024-01-08 ENCOUNTER — HOSPITAL ENCOUNTER (OUTPATIENT)
Dept: RADIOLOGY | Facility: HOSPITAL | Age: 54
Discharge: HOME | End: 2024-01-08
Attending: RADIOLOGY
Payer: COMMERCIAL

## 2024-01-08 VITALS
TEMPERATURE: 97.1 F | WEIGHT: 190 LBS | SYSTOLIC BLOOD PRESSURE: 120 MMHG | RESPIRATION RATE: 16 BRPM | DIASTOLIC BLOOD PRESSURE: 73 MMHG | BODY MASS INDEX: 30.53 KG/M2 | OXYGEN SATURATION: 96 % | HEART RATE: 67 BPM | HEIGHT: 66 IN

## 2024-01-08 DIAGNOSIS — I67.1 ANEURYSM OF ANTERIOR COMMUNICATING ARTERY: ICD-10-CM

## 2024-01-08 PROCEDURE — 36226 PLACE CATH VERTEBRAL ART: CPT

## 2024-01-08 PROCEDURE — C1887 CATHETER, GUIDING: HCPCS

## 2024-01-08 PROCEDURE — 76377 3D RENDER W/INTRP POSTPROCES: CPT | Performed by: RADIOLOGY

## 2024-01-08 PROCEDURE — C1769 GUIDE WIRE: HCPCS

## 2024-01-08 PROCEDURE — 76937 US GUIDE VASCULAR ACCESS: CPT

## 2024-01-08 PROCEDURE — 36223 PLACE CATH CAROTID/INOM ART: CPT | Performed by: RADIOLOGY

## 2024-01-08 PROCEDURE — 36223 PLACE CATH CAROTID/INOM ART: CPT

## 2024-01-08 PROCEDURE — 36226 PLACE CATH VERTEBRAL ART: CPT | Performed by: RADIOLOGY

## 2024-01-08 PROCEDURE — C1894 INTRO/SHEATH, NON-LASER: HCPCS

## 2024-01-08 RX ORDER — HEPARIN SODIUM 1000 [USP'U]/ML
INJECTION, SOLUTION INTRAVENOUS; SUBCUTANEOUS AS NEEDED
Status: COMPLETED | OUTPATIENT
Start: 2024-01-08 | End: 2024-01-08

## 2024-01-08 RX ORDER — VERAPAMIL HYDROCHLORIDE 2.5 MG/ML
INJECTION, SOLUTION INTRAVENOUS AS NEEDED
Status: COMPLETED | OUTPATIENT
Start: 2024-01-08 | End: 2024-01-08

## 2024-01-08 RX ORDER — LIDOCAINE HYDROCHLORIDE 10 MG/ML
INJECTION, SOLUTION EPIDURAL; INFILTRATION; INTRACAUDAL; PERINEURAL AS NEEDED
Status: DISCONTINUED | OUTPATIENT
Start: 2024-01-08 | End: 2024-01-08

## 2024-01-08 RX ORDER — MIDAZOLAM HYDROCHLORIDE 2 MG/2ML
INJECTION, SOLUTION INTRAMUSCULAR; INTRAVENOUS AS NEEDED
Status: DISCONTINUED | OUTPATIENT
Start: 2024-01-08 | End: 2024-01-08

## 2024-01-08 RX ORDER — PROPOFOL 10 MG/ML
INJECTION, EMULSION INTRAVENOUS AS NEEDED
Status: DISCONTINUED | OUTPATIENT
Start: 2024-01-08 | End: 2024-01-08

## 2024-01-08 RX ORDER — IODIXANOL 320 MG/ML
300 INJECTION, SOLUTION INTRAVASCULAR
Status: COMPLETED | OUTPATIENT
Start: 2024-01-08 | End: 2024-01-08

## 2024-01-08 RX ORDER — PHENYLEPHRINE HCL IN 0.9% NACL 1 MG/10 ML
SYRINGE (ML) INTRAVENOUS AS NEEDED
Status: DISCONTINUED | OUTPATIENT
Start: 2024-01-08 | End: 2024-01-08

## 2024-01-08 RX ORDER — NITROGLYCERIN 20 MG/100ML
INJECTION INTRAVENOUS AS NEEDED
Status: COMPLETED | OUTPATIENT
Start: 2024-01-08 | End: 2024-01-08

## 2024-01-08 RX ORDER — ONDANSETRON 2 MG/ML
INJECTION INTRAMUSCULAR; INTRAVENOUS AS NEEDED
Status: DISCONTINUED | OUTPATIENT
Start: 2024-01-08 | End: 2024-01-08

## 2024-01-08 RX ORDER — SODIUM CHLORIDE 9 MG/ML
75 INJECTION, SOLUTION INTRAVENOUS CONTINUOUS
Status: DISCONTINUED | OUTPATIENT
Start: 2024-01-08 | End: 2024-01-09 | Stop reason: HOSPADM

## 2024-01-08 RX ORDER — LIDOCAINE HYDROCHLORIDE 10 MG/ML
INJECTION, SOLUTION EPIDURAL; INFILTRATION; INTRACAUDAL; PERINEURAL AS NEEDED
Status: COMPLETED | OUTPATIENT
Start: 2024-01-08 | End: 2024-01-08

## 2024-01-08 RX ORDER — FENTANYL CITRATE 50 UG/ML
INJECTION, SOLUTION INTRAMUSCULAR; INTRAVENOUS AS NEEDED
Status: DISCONTINUED | OUTPATIENT
Start: 2024-01-08 | End: 2024-01-08

## 2024-01-08 RX ADMIN — LIDOCAINE HYDROCHLORIDE 1 ML: 10 INJECTION, SOLUTION EPIDURAL; INFILTRATION; INTRACAUDAL; PERINEURAL at 11:01

## 2024-01-08 RX ADMIN — Medication 100 MCG: at 11:06

## 2024-01-08 RX ADMIN — ONDANSETRON 4 MG: 2 INJECTION INTRAMUSCULAR; INTRAVENOUS at 10:43

## 2024-01-08 RX ADMIN — PROPOFOL 30 MG: 10 INJECTION, EMULSION INTRAVENOUS at 10:56

## 2024-01-08 RX ADMIN — IODIXANOL 145 ML: 320 INJECTION, SOLUTION INTRAVASCULAR at 12:15

## 2024-01-08 RX ADMIN — VERAPAMIL HYDROCHLORIDE 5 MG: 2.5 INJECTION INTRAVENOUS at 11:01

## 2024-01-08 RX ADMIN — FENTANYL CITRATE 25 MCG: 50 INJECTION INTRAMUSCULAR; INTRAVENOUS at 10:42

## 2024-01-08 RX ADMIN — LIDOCAINE HYDROCHLORIDE 50 MG: 10 INJECTION, SOLUTION EPIDURAL; INFILTRATION; INTRACAUDAL; PERINEURAL at 10:56

## 2024-01-08 RX ADMIN — LIDOCAINE HYDROCHLORIDE 3 ML: 10 INJECTION, SOLUTION EPIDURAL; INFILTRATION; INTRACAUDAL; PERINEURAL at 10:57

## 2024-01-08 RX ADMIN — SODIUM CHLORIDE 75 ML/HR: 0.9 INJECTION, SOLUTION INTRAVENOUS at 08:32

## 2024-01-08 RX ADMIN — MIDAZOLAM 1 MG: 1 INJECTION INTRAMUSCULAR; INTRAVENOUS at 10:42

## 2024-01-08 RX ADMIN — Medication 100 MCG: at 11:08

## 2024-01-08 RX ADMIN — FENTANYL CITRATE 25 MCG: 50 INJECTION INTRAMUSCULAR; INTRAVENOUS at 11:31

## 2024-01-08 RX ADMIN — FENTANYL CITRATE 25 MCG: 50 INJECTION INTRAMUSCULAR; INTRAVENOUS at 11:07

## 2024-01-08 RX ADMIN — HEPARIN SODIUM 4000 UNITS: 1000 INJECTION INTRAVENOUS; SUBCUTANEOUS at 11:01

## 2024-01-08 RX ADMIN — Medication 600 MCG: at 10:57

## 2024-01-08 RX ADMIN — Medication 400 MCG: at 11:01

## 2024-01-08 NOTE — ANESTHESIA POSTPROCEDURE EVALUATION
Post-Op Assessment Note    CV Status:  Stable    Pain management: adequate       Mental Status:  Alert and awake   Hydration Status:  Euvolemic   PONV Controlled:  Controlled   Airway Patency:  Patent     Post Op Vitals Reviewed: Yes      Staff: CRNA           BP   131/79   Temp      Pulse  79   Resp      SpO2   100 RA

## 2024-01-08 NOTE — ANESTHESIA PREPROCEDURE EVALUATION
Procedure:  IR CEREBRAL ANGIOGRAPHY    Aneurysm of anterior communicating artery  Pt presents for consultation for approx 5 mm Anterior communicating artery aneurysm.   Initially discovered on MRA head during workup for dizziness./vertigo in February 2023.      Imaging reviewed personally and by attending. Final results below discussed with the patient.   MRA head wo 2/15/23: 5 mm anterior communicating artery aneurysm.     Aleah here for preoperative risk assessment prior to cerebral angiogram with Dr Salter on 1/8/24.  She was incidentally found to have an aneurysm of the anterior communicating artery back in Feb when she had gone to the ER for an episode of vertigo, and is following up with angiogram.  She has NO history of CAD, CHF, CVA, insulin requiring DM, or renal dysfunction (Cr above 2).  Has no chest pain or sob upon exertion.  She is a non smoker and a nondrinker.  Pre-procedure labwork was reviewed with patient, showing normal CBC, normal PTT and PT/INR, and BMP with Cr of 0.78.  Her A1c went up a bit to 6.1%, c/w IFGT, and we discussed some dietary and exercise measures to help with that.  BP today a bit elevated at 140/88 upon retake.  Says she has a lot of stress going on with anxiety about the angiogram, and she and her  are not getting along, job, holidays, etc            The following portions of the patient's history were reviewed and updated as appropriate:   Past Medical History:  She has a past medical history of Abnormal Pap smear of cervix, Anemia, Arthritis, Asthma, Colon polyp, History of vertigo, Hyperlipemia, Hypertriglyceridemia, Hypothyroidism, Impaired fasting glucose, Insomnia, Lab test positive for detection of COVID-19 virus (12/22/2021), Premenstrual tension syndrome, and Tendonitis.,  ________________________________________________  Relevant Problems   ENDO   (+) Acquired hypothyroidism      MUSCULOSKELETAL   (+) Patellofemoral arthritis of right knee      PULMONARY    (+) Moderate persistent asthma with acute exacerbation        Physical Exam    Airway    Mallampati score: III  TM Distance: >3 FB  Neck ROM: full     Dental       Cardiovascular  Cardiovascular exam normal    Pulmonary  Pulmonary exam normal     Other Findings  post-pubertal.      Anesthesia Plan  ASA Score- 3     Anesthesia Type- IV sedation with anesthesia with ASA Monitors.         Additional Monitors:     Airway Plan:            Plan Factors-Exercise tolerance (METS): >4 METS.    Chart reviewed. EKG reviewed.  Existing labs reviewed. Patient summary reviewed.    Patient is not a current smoker.              Induction- intravenous.    Postoperative Plan-     Informed Consent- Anesthetic plan and risks discussed with patient.  I personally reviewed this patient with the CRNA. Discussed and agreed on the Anesthesia Plan with the CRNA..

## 2024-01-08 NOTE — SEDATION DOCUMENTATION
Diagnostic cerebral angiogram successfully completed by Dr. Salter without complications. Tolerated well with anesthesia support. Bedrest for 2hr. TR band with 17ml air - patent hemostasis. Report called to [facility] RN.

## 2024-01-08 NOTE — DISCHARGE INSTRUCTIONS
Today, you underwent a diagnostic cerebral angiogram under the care of Dr. Salter for evaluation of aneurysm  ?  The following instructions will help you care for yourself, or be cared for upon your return home today. These are guidelines for your care right after your surgery only.   ?  Notify Your Doctor or Nurse if you have any of the following:  ?  SYMPTOMS OF WOUND INFECTION--   Increased pain in or around the incision   Swelling around the incision  Any drainage from the incision  Incision separates or opens up  Warmth in the tissues around the incision  Redness or tenderness on the skin near the incision   Fever (temperature greater than 101 degrees F)   ?  NEUROLOGICAL CHANGES--  Change in alertness  Increased sleepiness   Nausea and vomiting   New onset of numbness or weakness in arms or legs   New problems with your bowels or bladder  New or worse problems with balance or walking  Seizures, new or worsening  ?  UNRELIEVED HEADACHE PAIN--  New or increased pain unrelieved with pain medications   Pain associated with nausea and vomiting   Pain associated with other symptoms  ?  QUESTIONS OR PROBLEMS--  Any questions or problems that you are unsure about  Wound Care:  Keep Incision Clean and Dry   You may shower daily, but do not soak incision. Pat dry after showering.   No tub baths, soaking, swimming for 1 week after angiogram.   You do not need to cover the incision. Mild to moderate bruising and tenderness to the site is expected and may last up to 1-2 weeks after your procedure.   ?  A closure device was placed at the catheter insertion site. This is MRI compatible. Remove the dressing 24 hours after your procedure.   If your groin site is bleeding, apply firm pressure for 10 minutes. Reinforce dressing rather than removing and checking frequently. If continues to bleed through the dressing after 1 hour, contact your neurosurgeon's office.   Anticipatory Education:  ?  PAIN MED W/ Acetaminophen  (Tylenol)  --IF a prescription for pain medicine has been sent home with you:  --Narcotic pain medication may cause constipation. Be sure to take stool softeners or laxatives while you are on narcotic pain medication.   --Do not drive after taking prescription pain medicine.   ?  If this medicine is too strong, or no longer necessary, or we did NOT recommend/prescribe oral narcotics, you may take:   - Tylenol Extra-strength/Acetaminophen, 2 tablets every 4-6 hours as needed for mild pain. DO NOT TAKE MORE THAN 4000MG PER DAY from combined sources. NOTE: Remember to eat when taking pain medicines in order to avoid nausea. Watch for constipation. Eat plenty of fruits, vegetables, juices, and drink 6-8 glasses of water each day.   Constipation: Stay active and drink at least 6-8 cups of fluid each day to prevent constipation. If you need a laxative or stool softener follow the package directions or consult with your local pharmacists if you have questions.  ?  After anesthesia, rest for 24 hours. Do not drive, drink alcohol beverages or make any important decisions during this time. General anesthesia may cause sore throat, jaw discomfort or muscle aches. These symptoms can last for one or two days.  Activity: Please follow these instructions:  Advance your activity as you can tolerate. You may do light house work; nothing strenuous   You may walk all you want. You may go up and down the steps. Use the railing for support  Do not do excessive bending, straining or heavy lifting for 48 hours after your procedure  Do not drive or return to work until you are instructed   It is normal for your energy level and sleep patterns to change after surgery.   Get extra sleep at night and take naps during the day to help you feel less tired.   Take rest periods during the day.   Complete recovery may take several weeks.  ?  You may resume driving after 24-48 hours recovery.   You may return to work after 48 hours of recovery.    ?  Diet:  Your doctor has recommended that you follow these diet instructions at home. Refer to the patient education materials you received during your hospital stay. If you would like more nutrition counseling, ask your doctor about making an appointment with an outpatient dietitian.  Resume your home diet  ?  Medications:  Please resume your home medications as instructed.   ?  Home Supplies and Equipment:  none  Additional Contacts:  ?  CONTACTS FOR NEUROSURGERY: You may call your neurosurgeon’s office if you have questions between 8:30 am and 4:30 pm. You may request to speak to the nurse practitioner who is available Monday through Friday.   ?  For off hours or the weekend you may call your neurosurgeon's office to leave a message.

## 2024-01-12 ENCOUNTER — TELEPHONE (OUTPATIENT)
Dept: NEUROSURGERY | Facility: CLINIC | Age: 54
End: 2024-01-12

## 2024-01-12 NOTE — TELEPHONE ENCOUNTER
Completed post angio callback to Aleah Newton at primary contact number. The patient denies any pain, swelling, drainage, fevers at the puncture site. Is not currently experiencing any numbness, tingling, or weakness in her arm. Reports no headaches at this time.  Explained that she should contact the office if she experiences any pain, swelling, or drainage from the site, and report to the ER or call 911 if she experiences WHOL, visual disturbance, of confusion/disorientation, slurred speech, ambulatory dysfunction. Reminded of post procedure follow-up scheduled on 1/26/2024. Patient appreciative of call.

## 2024-01-26 ENCOUNTER — OFFICE VISIT (OUTPATIENT)
Dept: NEUROSURGERY | Facility: CLINIC | Age: 54
End: 2024-01-26
Payer: COMMERCIAL

## 2024-01-26 VITALS
HEIGHT: 66 IN | OXYGEN SATURATION: 98 % | SYSTOLIC BLOOD PRESSURE: 138 MMHG | BODY MASS INDEX: 30.53 KG/M2 | WEIGHT: 190 LBS | HEART RATE: 62 BPM | TEMPERATURE: 98.2 F | DIASTOLIC BLOOD PRESSURE: 82 MMHG

## 2024-01-26 DIAGNOSIS — I67.1 ANEURYSM OF ANTERIOR COMMUNICATING ARTERY: Primary | ICD-10-CM

## 2024-01-26 PROCEDURE — 99212 OFFICE O/P EST SF 10 MIN: CPT | Performed by: RADIOLOGY

## 2024-01-26 NOTE — PROGRESS NOTES
Assessment/Plan:     Diagnoses and all orders for this visit:    Aneurysm of anterior communicating artery       Discussion Summary:   Aleah is doing well following angiography.  This did confirm a 5 mm anterior communicating artery aneurysm.  Based on the size and location I have recommended treatment for this.  We discussed treatment options of open surgical versus endovascular approaches.  She is not ready and not able to logistically to undergo treatment at this time and has requested that time closer to the summer.  I believe it is relatively safe to wait however she understands I cannot make guarantees.  She will return in approximately 5 months for further discussion.      Chief Complaint: Follow-up (2 week Angio)      Patient ID: Aleah Newton is a 53 y.o. female    HPI    Ms. Newton is approximately 2 weeks status post cerebral angiography via a right radial approach. She denies any hand or wrist pain or numbness. No puncture site bruising, swelling or drainage. No fevers. No vision changes, arm or leg weakness or numbness. No stroke-like changes.      Review of Systems   HENT:  Positive for tinnitus (left ear, over 1 year).    Eyes:  Negative for pain and visual disturbance.   Respiratory:  Negative for cough, chest tightness, shortness of breath and wheezing.    Cardiovascular:  Negative for chest pain.   Gastrointestinal: Negative.    Genitourinary: Negative.    Musculoskeletal:  Negative for arthralgias and gait problem.   Skin:  Negative for rash.   Neurological:  Positive for dizziness. Negative for tremors, seizures, syncope, speech difficulty, weakness, numbness and headaches.   Psychiatric/Behavioral:  Positive for sleep disturbance. Negative for confusion and decreased concentration. The patient is nervous/anxious.                  I have personally reviewed the MA's review of systems and made changes as necessary.    The following portions of the patient's history were reviewed and updated as  appropriate: allergies, current medications, past family history, past medical history, past social history, past surgical history, and problem list.      Active Ambulatory Problems     Diagnosis Date Noted    Mass of right axilla 12/07/2020    Family history of breast cancer 12/07/2020    Family history of colon cancer 04/14/2021    Acquired hypothyroidism 04/14/2021    Moderate persistent asthma with acute exacerbation 12/23/2021    Chronic pain of both ankles 05/12/2021    Flat foot 05/12/2021    Patellofemoral arthritis of right knee 09/26/2022    Lichen sclerosus 11/01/2022    Elevated blood pressure reading in office without diagnosis of hypertension 11/29/2022    Cough present for greater than 3 weeks 11/29/2022    Aneurysm of anterior communicating artery 05/08/2023    Skin rash 07/24/2023    IFG (impaired fasting glucose) 12/15/2023     Resolved Ambulatory Problems     Diagnosis Date Noted    Screening for cervical cancer 04/14/2021    Acute frontal sinusitis 11/29/2022     Past Medical History:   Diagnosis Date    Abnormal Pap smear of cervix     Anemia     Arthritis     Asthma     Colon polyp     History of vertigo     Hyperlipemia     Hypertriglyceridemia     Hypothyroidism     Impaired fasting glucose     Insomnia     Lab test positive for detection of COVID-19 virus 12/22/2021    Premenstrual tension syndrome     Tendonitis        Past Surgical History:   Procedure Laterality Date    COLONOSCOPY  2016    COLONOSCOPY  11/04/2023    IR CEREBRAL ANGIOGRAPHY  1/8/2024    MAMMO (HISTORICAL) Bilateral 11/03/2020    WISDOM TOOTH EXTRACTION         Current Outpatient Medications   Medication Sig Dispense Refill    ALBUTEROL IN Inhale if needed      levothyroxine 50 mcg tablet TAKE 1 TABLET BY MOUTH EVERY DAY 90 tablet 3    Multiple Vitamin (MULTI-VITAMIN DAILY PO) Take by mouth in the morning      Zolpidem Tartrate (AMBIEN PO) Take by mouth       No current facility-administered medications for this visit.        There were no vitals filed for this visit.      Objective:    Physical Exam  Neurologic Exam    Results/Data:  I have reviewed the results and images from the angiogram in detail with the patient.

## 2024-03-05 ENCOUNTER — TELEPHONE (OUTPATIENT)
Age: 54
End: 2024-03-05

## 2024-03-05 DIAGNOSIS — J11.1 INFLUENZA: Primary | ICD-10-CM

## 2024-03-05 RX ORDER — OSELTAMIVIR PHOSPHATE 75 MG/1
75 CAPSULE ORAL 2 TIMES DAILY
Qty: 10 CAPSULE | Refills: 0 | Status: SHIPPED | OUTPATIENT
Start: 2024-03-05 | End: 2024-03-10

## 2024-03-05 NOTE — TELEPHONE ENCOUNTER
Aleah is calling stating that her son was diagnosed with the flu and now she is starting to get cough, achy and wants to know if the doctor can call in Tamiflu.  She did not want to schedule apt at this time just wanted to see if she could get a prescription.  Thank you

## 2024-03-06 DIAGNOSIS — E03.9 ACQUIRED HYPOTHYROIDISM: ICD-10-CM

## 2024-03-06 RX ORDER — LEVOTHYROXINE SODIUM 0.05 MG/1
TABLET ORAL
Qty: 90 TABLET | Refills: 1 | Status: SHIPPED | OUTPATIENT
Start: 2024-03-06

## 2024-03-14 ENCOUNTER — CONSULT (OUTPATIENT)
Dept: DERMATOLOGY | Facility: CLINIC | Age: 54
End: 2024-03-14
Payer: COMMERCIAL

## 2024-03-14 VITALS — WEIGHT: 193 LBS | HEIGHT: 66 IN | TEMPERATURE: 97.6 F | BODY MASS INDEX: 31.02 KG/M2

## 2024-03-14 DIAGNOSIS — R21 SKIN RASH: ICD-10-CM

## 2024-03-14 DIAGNOSIS — L81.9 HYPERPIGMENTATION: ICD-10-CM

## 2024-03-14 DIAGNOSIS — L30.9 DERMATITIS: Primary | ICD-10-CM

## 2024-03-14 PROCEDURE — 99244 OFF/OP CNSLTJ NEW/EST MOD 40: CPT | Performed by: DERMATOLOGY

## 2024-03-14 RX ORDER — HYDROQUINONE 40 MG/G
CREAM TOPICAL 2 TIMES DAILY
Qty: 30 G | Refills: 0 | Status: SHIPPED | OUTPATIENT
Start: 2024-03-14

## 2024-03-14 NOTE — PROGRESS NOTES
"[facility] Note     Patient Name: Aleah Newton  Encounter Date: 3/14/2024     Have you been cared for by a [facility] Dermatologist in the last 3 years and, if so, which description applies to you?    NO.   I am considered a \"new\" patient and must complete all patient intake questions. I am FEMALE/of child-bearing potential.    REVIEW OF SYSTEMS:  Have you recently had or currently have any of the following? Recent fever or chills? YES, Chills   Any non-healing wound? No  Are you pregnant or planning to become pregnant? No  Are you currently or planning to be nursing or breast feeding? No   PAST MEDICAL HISTORY:  Have you personally ever had or currently have any of the following?  If \"YES,\" then please provide more detail. Skin cancer (such as Melanoma, Basal Cell Carcinoma, Squamous Cell Carcinoma?  No  Tuberculosis, HIV/AIDS, Hepatitis B or C: No  Radiation Treatment No   HISTORY OF IMMUNOSUPPRESSION:   Do you have a history of any of the following:  Systemic Immunosuppression such as Diabetes, Biologic or Immunotherapy, Chemotherapy, Organ Transplantation, Bone Marrow Transplantation?  No    Answering \"YES\" requires the addition of the dotphrase \"IMMUNOSUPPRESSED\" as the first diagnosis of the patient's visit.   FAMILY HISTORY:  Any \"first degree relatives\" (parent, brother, sister, or child) with the following?    Skin Cancer, Pancreatic or Other Cancer? YES, Mother had colon cancer.   PATIENT EXPERIENCE:    Do you want the Dermatologist to perform a COMPLETE skin exam today including a clinical examination under the \"bra and underwear\" areas?  NO  If necessary, do we have your permission to call and leave a detailed message on your Preferred Phone number that includes your specific medical information?  Yes      Allergies   Allergen Reactions    Niacin Hives     In high doses   (Flushing)    Mucinex Fast-Max Chest Scott Ms [Guaifenesin] Palpitations      Current Outpatient Medications:     " "ALBUTEROL IN, Inhale if needed, Disp: , Rfl:     levothyroxine 50 mcg tablet, TAKE 1 TABLET BY MOUTH EVERY DAY, Disp: 90 tablet, Rfl: 1    Multiple Vitamin (MULTI-VITAMIN DAILY PO), Take by mouth in the morning, Disp: , Rfl:     Zolpidem Tartrate (AMBIEN PO), Take by mouth, Disp: , Rfl:           Whom besides the patient is providing clinical information about today's encounter?   NO ADDITIONAL HISTORIAN (patient alone provided history)    Physical Exam and Assessment/Plan by Diagnosis:      DERMATITIS      Physical Exam:  (Anatomic Location); (Size and Morphological Description); (Differential Diagnosis):  Lower mid back with mild erythematous plaque overlying hyperpigmented patch   Additional History of Present Condition:  Present for a year. The rash gets dry and itchy, but not painful. She applied otc Cerave cream.     Assessment and Plan:  Based on a thorough discussion of this condition and the management approach to it (including a comprehensive discussion of the known risks, side effects and potential benefits of treatment), the patient (family) agrees to implement the following specific plan:  Discussed post inflammatory hyperpigmentation - patient deferred treatment for PIH  Start Lidex 0.05% cream. Apply a thin layer twice a day as needed only when pink and itchy.  Follow up as needed             SEBORRHEIC KERATOSIS; NON-INFLAMED     Physical Exam:  Anatomic Location Affected:  Left buttock  Morphological Description:  Waxy, stuck on papule   Present for years. Denies pain, itch, bleeding.      Additional History of Present Condition:  Present constantly; no modifying factors which make it worse or better. No prior treatment.  \"It keeps getting bigger\"     Assessment and Plan:  Based on a thorough discussion of this condition and the management approach to it (including a comprehensive discussion of the known risks, side effects and potential benefits of treatment), the patient (family) agrees to " implement the following specific plan:  Reassure benign  Use sun protection.  Apply SPF 30 or higher at least three times a day.  Wear sun protecting clothing and hats.     HYPERPIGMENTATION     Physical Exam:  Anatomic Location Affected:  right lower eyelid   Morphological Description:  verrucous papule     Additional History of Present Condition:  The patient explains that a dog scratched her face on the right lower eyelid area in January, causing a dark areas to form. She is seeking advice on how to heal or remove the discoloration.     Assessment and Plan:  Based on a thorough discussion of this condition and the management approach to it (including a comprehensive discussion of the known risks, side effects and potential benefits of treatment), the patient (family) agrees to implement the following specific plan:    Start Hydroquinone 4% cream. Apply it twice a day on the dark area of face. Use it on the dark area only.    Scribe Attestation      I,:  Layne Mckinnon am acting as a scribe while in the presence of the attending physician.:       I,:  Valerie Sam MD personally performed the services described in this documentation    as scribed in my presence.:

## 2024-03-18 ENCOUNTER — OFFICE VISIT (OUTPATIENT)
Dept: NEUROSURGERY | Facility: CLINIC | Age: 54
End: 2024-03-18
Payer: COMMERCIAL

## 2024-03-18 VITALS
TEMPERATURE: 98.1 F | WEIGHT: 193 LBS | HEIGHT: 66 IN | DIASTOLIC BLOOD PRESSURE: 88 MMHG | OXYGEN SATURATION: 98 % | RESPIRATION RATE: 16 BRPM | BODY MASS INDEX: 31.02 KG/M2 | SYSTOLIC BLOOD PRESSURE: 130 MMHG | HEART RATE: 101 BPM

## 2024-03-18 DIAGNOSIS — I67.1 ANEURYSM OF ANTERIOR COMMUNICATING ARTERY: Primary | ICD-10-CM

## 2024-03-18 DIAGNOSIS — Z01.812 PRE-OPERATIVE LABORATORY EXAMINATION: ICD-10-CM

## 2024-03-18 PROCEDURE — 99215 OFFICE O/P EST HI 40 MIN: CPT | Performed by: RADIOLOGY

## 2024-03-18 RX ORDER — SODIUM CHLORIDE 9 MG/ML
75 INJECTION, SOLUTION INTRAVENOUS CONTINUOUS
OUTPATIENT
Start: 2024-03-18

## 2024-03-18 NOTE — PROGRESS NOTES
Assessment/Plan:     Diagnoses and all orders for this visit:    Aneurysm of anterior communicating artery  -     IR cerebral angiography / intervention; Future       Discussion Summary:   Aleah has a 5 mm ACOM aneurysm that I have again recommended treatment with endovascular embolization utilizing the WEB device.  She understands the risks and has elected proceed.  She requires medical clearance.  She will start aspirin Plavix approximately 7 days prior and will obtain a P2 Y12 test the day before.  We will utilize a femoral approach      Chief Complaint: Follow-up      Patient ID: Aleah Newton is a 53 y.o. female    HPI  Ms. Newton returns for follow-up of her cerebral aneurysm.  Originally she was concerned about taking time off from work and wished to delay treatment but now her circumstances have changed and would like to proceed. Otherwise she has no new complaints since her last visit.  No progressive headaches.  No strokelike symptoms.      Review of Systems   HENT:  Positive for sinus pain (related to allergies) and tinnitus (left ear, over 1 year).    Eyes:  Negative for pain and visual disturbance.   Respiratory:  Negative for cough, chest tightness, shortness of breath and wheezing.    Cardiovascular:  Negative for chest pain.   Gastrointestinal: Negative.    Genitourinary: Negative.    Musculoskeletal:  Negative for arthralgias and gait problem.   Skin:  Negative for rash.   Neurological:  Positive for dizziness (per pt better). Negative for tremors, seizures, syncope, speech difficulty, weakness, light-headedness, numbness and headaches.   Psychiatric/Behavioral:  Positive for sleep disturbance. Negative for confusion and decreased concentration.                  I have personally reviewed the MA's review of systems and made changes as necessary.    The following portions of the patient's history were reviewed and updated as appropriate: allergies, current medications, past family history, past  medical history, past social history, past surgical history, and problem list.      Active Ambulatory Problems     Diagnosis Date Noted    Mass of right axilla 12/07/2020    Family history of breast cancer 12/07/2020    Family history of colon cancer 04/14/2021    Acquired hypothyroidism 04/14/2021    Moderate persistent asthma with acute exacerbation 12/23/2021    Chronic pain of both ankles 05/12/2021    Flat foot 05/12/2021    Patellofemoral arthritis of right knee 09/26/2022    Lichen sclerosus 11/01/2022    Elevated blood pressure reading in office without diagnosis of hypertension 11/29/2022    Cough present for greater than 3 weeks 11/29/2022    Aneurysm of anterior communicating artery 05/08/2023    Skin rash 07/24/2023    IFG (impaired fasting glucose) 12/15/2023     Resolved Ambulatory Problems     Diagnosis Date Noted    Screening for cervical cancer 04/14/2021    Acute frontal sinusitis 11/29/2022     Past Medical History:   Diagnosis Date    Abnormal Pap smear of cervix     Anemia     Arthritis     Asthma     Colon polyp     History of vertigo     Hyperlipemia     Hypertriglyceridemia     Hypothyroidism     Impaired fasting glucose     Insomnia     Lab test positive for detection of COVID-19 virus 12/22/2021    Premenstrual tension syndrome     Tendonitis        Past Surgical History:   Procedure Laterality Date    COLONOSCOPY  2016    COLONOSCOPY  11/04/2023    IR CEREBRAL ANGIOGRAPHY  1/8/2024    MAMMO (HISTORICAL) Bilateral 11/03/2020    WISDOM TOOTH EXTRACTION         Current Outpatient Medications   Medication Sig Dispense Refill    ALBUTEROL IN Inhale if needed      fluocinonide (LIDEX) 0.05 % cream Apply twice a day to the back as needed only when pink and itchy. 60 g 3    hydroquinone 4 % cream Apply topically 2 (two) times a day To the face (dark area) only. 30 g 0    levothyroxine 50 mcg tablet TAKE 1 TABLET BY MOUTH EVERY DAY 90 tablet 1    Multiple Vitamin (MULTI-VITAMIN DAILY PO) Take by  mouth in the morning      Zolpidem Tartrate (AMBIEN PO) Take by mouth       No current facility-administered medications for this visit.       Vitals:    03/18/24 0811   BP: 130/88   Pulse: 101   Resp: 16   Temp: 98.1 °F (36.7 °C)   SpO2: 98%         Objective:    Physical Exam  Constitutional:       Appearance: Normal appearance.   HENT:      Head: Normocephalic.   Eyes:      Pupils: Pupils are equal, round, and reactive to light.   Pulmonary:      Effort: Pulmonary effort is normal.   Musculoskeletal:         General: Normal range of motion.   Skin:     General: Skin is warm and dry.   Neurological:      General: No focal deficit present.      Mental Status: She is alert and oriented to person, place, and time. Mental status is at baseline.   Psychiatric:         Mood and Affect: Mood normal.         Behavior: Behavior normal.         Thought Content: Thought content normal.         Judgment: Judgment normal.       Neurologic Exam     Mental Status   Oriented to person, place, and time.     Cranial Nerves     CN III, IV, VI   Pupils are equal, round, and reactive to light.      Results/Data:  I have reviewed the results and images from the angiogram in detail with the patient.

## 2024-03-20 ENCOUNTER — OFFICE VISIT (OUTPATIENT)
Dept: FAMILY MEDICINE CLINIC | Facility: CLINIC | Age: 54
End: 2024-03-20
Payer: COMMERCIAL

## 2024-03-20 VITALS
DIASTOLIC BLOOD PRESSURE: 80 MMHG | TEMPERATURE: 98 F | BODY MASS INDEX: 31.02 KG/M2 | HEART RATE: 76 BPM | RESPIRATION RATE: 16 BRPM | WEIGHT: 193 LBS | SYSTOLIC BLOOD PRESSURE: 110 MMHG | OXYGEN SATURATION: 98 % | HEIGHT: 66 IN

## 2024-03-20 DIAGNOSIS — J32.9 SINUSITIS, UNSPECIFIED CHRONICITY, UNSPECIFIED LOCATION: Primary | ICD-10-CM

## 2024-03-20 PROCEDURE — 99214 OFFICE O/P EST MOD 30 MIN: CPT | Performed by: INTERNAL MEDICINE

## 2024-03-20 RX ORDER — AMOXICILLIN AND CLAVULANATE POTASSIUM 875; 125 MG/1; MG/1
1 TABLET, FILM COATED ORAL EVERY 12 HOURS SCHEDULED
Qty: 20 TABLET | Refills: 0 | Status: SHIPPED | OUTPATIENT
Start: 2024-03-20 | End: 2024-03-30

## 2024-03-20 NOTE — PROGRESS NOTES
Assessment/Plan:Aleah probably had viral syndrome to start out with and then may have had a bacterial process move in on top of it-suggest running a course of an antibiotic and trying some claritin d, as well as hot fluids, otc cough and cold meds         Problem List Items Addressed This Visit    None  Visit Diagnoses       Sinusitis, unspecified chronicity, unspecified location    -  Primary    Relevant Medications    loratadine-pseudoephedrine (CLARITIN-D 12-HOUR) 5-120 mg per tablet    amoxicillin-clavulanate (AUGMENTIN) 875-125 mg per tablet              Subjective:      Patient ID: Aleah Newton is a 53 y.o. female.    Aleah recently diagnosed with influenza and had congestion, cough, n/v/d-took Tamiflu and GI symptoms improved but now has cough, sinus pressure, congestion, sore throat again        The following portions of the patient's history were reviewed and updated as appropriate:   Past Medical History:  She has a past medical history of Abnormal Pap smear of cervix, Anemia, Arthritis, Asthma, Colon polyp, History of vertigo, Hyperlipemia, Hypertriglyceridemia, Hypothyroidism, Impaired fasting glucose, Insomnia, Lab test positive for detection of COVID-19 virus (12/22/2021), Premenstrual tension syndrome, and Tendonitis.,  _______________________________________________________________________  Medical Problems:  does not have any pertinent problems on file.,  _______________________________________________________________________  Past Surgical History:   has a past surgical history that includes Colonoscopy (2016); Mammo (historical) (Bilateral, 11/03/2020); [place] tooth extraction; Colonoscopy (11/04/2023); and IR cerebral angiography (1/8/2024).,  _______________________________________________________________________  Family History:  family history includes Alzheimer's disease in her maternal aunt; Brain cancer in her paternal grandmother; Breast cancer in her maternal grandmother; Colon cancer  (age of onset: 72) in her mother; Diabetes in her mother; Heart attack in her father; Heart disease in her father; Heart failure in her father; Hyperlipidemia in her father; Hypertension in her mother; No Known Problems in her maternal grandfather, paternal grandfather, paternal uncle, sister, and son.,  _______________________________________________________________________  Social History:   reports that she has never smoked. She has never used smokeless tobacco. She reports current alcohol use. She reports that she does not use drugs.,  _______________________________________________________________________  Allergies:  is allergic to niacin and mucinex fast-max chest glenn ms [guaifenesin]..  _______________________________________________________________________  Current Outpatient Medications   Medication Sig Dispense Refill    ALBUTEROL IN Inhale if needed      amoxicillin-clavulanate (AUGMENTIN) 875-125 mg per tablet Take 1 tablet by mouth every 12 (twelve) hours for 10 days 20 tablet 0    fluocinonide (LIDEX) 0.05 % cream Apply twice a day to the back as needed only when pink and itchy. 60 g 3    hydroquinone 4 % cream Apply topically 2 (two) times a day To the face (dark area) only. 30 g 0    levothyroxine 50 mcg tablet TAKE 1 TABLET BY MOUTH EVERY DAY 90 tablet 1    loratadine-pseudoephedrine (CLARITIN-D 12-HOUR) 5-120 mg per tablet Take 1 tablet by mouth daily as needed for allergies 40 tablet 1    Multiple Vitamin (MULTI-VITAMIN DAILY PO) Take by mouth in the morning      Zolpidem Tartrate (AMBIEN PO) Take by mouth       No current facility-administered medications for this visit.     _______________________________________________________________________  Review of Systems   Constitutional:  Positive for fatigue. Negative for fever.   HENT:  Positive for congestion, postnasal drip, rhinorrhea, sinus pressure and sore throat.    Respiratory:  Positive for cough.    Gastrointestinal: Negative.   "  Musculoskeletal: Negative.    Hematological: Negative.    Psychiatric/Behavioral: Negative.           Objective:  Vitals:    03/20/24 1625   BP: 110/80   BP Location: Left arm   Patient Position: Sitting   Cuff Size: Large   Pulse: 76   Resp: 16   Temp: 98 °F (36.7 °C)   TempSrc: Tympanic   SpO2: 98%   Weight: 87.5 kg (193 lb)   Height: 5' 6\" (1.676 m)     Body mass index is 31.15 kg/m².     Physical Exam  Constitutional:       Appearance: Normal appearance.   HENT:      Head: Normocephalic and atraumatic.      Right Ear: Tympanic membrane, ear canal and external ear normal.      Left Ear: Tympanic membrane, ear canal and external ear normal.      Nose: Congestion present.      Comments: Sinus pressure     Mouth/Throat:      Pharynx: Posterior oropharyngeal erythema present.   Eyes:      Extraocular Movements: Extraocular movements intact.      Pupils: Pupils are equal, round, and reactive to light.   Cardiovascular:      Rate and Rhythm: Normal rate and regular rhythm.   Pulmonary:      Effort: Pulmonary effort is normal.      Breath sounds: Normal breath sounds.   Musculoskeletal:         General: Normal range of motion.      Cervical back: Normal range of motion and neck supple.   Skin:     General: Skin is warm.   Neurological:      Mental Status: She is alert.         "

## 2024-04-08 ENCOUNTER — APPOINTMENT (OUTPATIENT)
Dept: LAB | Facility: HOSPITAL | Age: 54
End: 2024-04-08
Payer: COMMERCIAL

## 2024-04-08 DIAGNOSIS — Z01.812 PRE-OPERATIVE LABORATORY EXAMINATION: ICD-10-CM

## 2024-04-08 DIAGNOSIS — I67.1 ANEURYSM OF ANTERIOR COMMUNICATING ARTERY: ICD-10-CM

## 2024-04-08 LAB
ALBUMIN SERPL BCP-MCNC: 4.3 G/DL (ref 3.5–5)
ALP SERPL-CCNC: 55 U/L (ref 34–104)
ALT SERPL W P-5'-P-CCNC: 11 U/L (ref 7–52)
ANION GAP SERPL CALCULATED.3IONS-SCNC: 9 MMOL/L (ref 4–13)
APTT PPP: 32 SECONDS (ref 23–37)
AST SERPL W P-5'-P-CCNC: 14 U/L (ref 13–39)
BACTERIA UR QL AUTO: ABNORMAL /HPF
BASOPHILS # BLD AUTO: 0.04 THOUSANDS/ÂΜL (ref 0–0.1)
BASOPHILS NFR BLD AUTO: 1 % (ref 0–1)
BILIRUB SERPL-MCNC: 0.66 MG/DL (ref 0.2–1)
BILIRUB UR QL STRIP: NEGATIVE
BUN SERPL-MCNC: 13 MG/DL (ref 5–25)
CALCIUM SERPL-MCNC: 9.6 MG/DL (ref 8.4–10.2)
CHLORIDE SERPL-SCNC: 105 MMOL/L (ref 96–108)
CLARITY UR: CLEAR
CO2 SERPL-SCNC: 27 MMOL/L (ref 21–32)
COLOR UR: YELLOW
CREAT SERPL-MCNC: 0.8 MG/DL (ref 0.6–1.3)
EOSINOPHIL # BLD AUTO: 0.29 THOUSAND/ÂΜL (ref 0–0.61)
EOSINOPHIL NFR BLD AUTO: 4 % (ref 0–6)
ERYTHROCYTE [DISTWIDTH] IN BLOOD BY AUTOMATED COUNT: 14.2 % (ref 11.6–15.1)
EST. AVERAGE GLUCOSE BLD GHB EST-MCNC: 126 MG/DL
GFR SERPL CREATININE-BSD FRML MDRD: 83 ML/MIN/1.73SQ M
GLUCOSE P FAST SERPL-MCNC: 110 MG/DL (ref 65–99)
GLUCOSE UR STRIP-MCNC: NEGATIVE MG/DL
HBA1C MFR BLD: 6 %
HCT VFR BLD AUTO: 41.2 % (ref 34.8–46.1)
HGB BLD-MCNC: 12.8 G/DL (ref 11.5–15.4)
HGB UR QL STRIP.AUTO: ABNORMAL
IMM GRANULOCYTES # BLD AUTO: 0.02 THOUSAND/UL (ref 0–0.2)
IMM GRANULOCYTES NFR BLD AUTO: 0 % (ref 0–2)
INR PPP: 1 (ref 0.84–1.19)
KETONES UR STRIP-MCNC: NEGATIVE MG/DL
LEUKOCYTE ESTERASE UR QL STRIP: NEGATIVE
LYMPHOCYTES # BLD AUTO: 3 THOUSANDS/ÂΜL (ref 0.6–4.47)
LYMPHOCYTES NFR BLD AUTO: 39 % (ref 14–44)
MCH RBC QN AUTO: 26.7 PG (ref 26.8–34.3)
MCHC RBC AUTO-ENTMCNC: 31.1 G/DL (ref 31.4–37.4)
MCV RBC AUTO: 86 FL (ref 82–98)
MONOCYTES # BLD AUTO: 0.54 THOUSAND/ÂΜL (ref 0.17–1.22)
MONOCYTES NFR BLD AUTO: 7 % (ref 4–12)
MUCOUS THREADS UR QL AUTO: ABNORMAL
NEUTROPHILS # BLD AUTO: 3.74 THOUSANDS/ÂΜL (ref 1.85–7.62)
NEUTS SEG NFR BLD AUTO: 49 % (ref 43–75)
NITRITE UR QL STRIP: NEGATIVE
NON-SQ EPI CELLS URNS QL MICRO: ABNORMAL /HPF
NRBC BLD AUTO-RTO: 0 /100 WBCS
PH UR STRIP.AUTO: 6 [PH]
PLATELET # BLD AUTO: 182 THOUSANDS/UL (ref 149–390)
PMV BLD AUTO: 11.5 FL (ref 8.9–12.7)
POTASSIUM SERPL-SCNC: 4.4 MMOL/L (ref 3.5–5.3)
PROT SERPL-MCNC: 7.5 G/DL (ref 6.4–8.4)
PROT UR STRIP-MCNC: NEGATIVE MG/DL
PROTHROMBIN TIME: 13.1 SECONDS (ref 11.6–14.5)
RBC # BLD AUTO: 4.79 MILLION/UL (ref 3.81–5.12)
RBC #/AREA URNS AUTO: ABNORMAL /HPF
SODIUM SERPL-SCNC: 141 MMOL/L (ref 135–147)
SP GR UR STRIP.AUTO: 1.02 (ref 1–1.03)
UROBILINOGEN UR QL STRIP.AUTO: 0.2 E.U./DL
WBC # BLD AUTO: 7.63 THOUSAND/UL (ref 4.31–10.16)
WBC #/AREA URNS AUTO: ABNORMAL /HPF

## 2024-04-08 PROCEDURE — 85730 THROMBOPLASTIN TIME PARTIAL: CPT

## 2024-04-08 PROCEDURE — 36415 COLL VENOUS BLD VENIPUNCTURE: CPT

## 2024-04-08 PROCEDURE — 81001 URINALYSIS AUTO W/SCOPE: CPT

## 2024-04-08 PROCEDURE — 80053 COMPREHEN METABOLIC PANEL: CPT

## 2024-04-08 PROCEDURE — 85025 COMPLETE CBC W/AUTO DIFF WBC: CPT

## 2024-04-08 PROCEDURE — 85610 PROTHROMBIN TIME: CPT

## 2024-04-08 PROCEDURE — 83036 HEMOGLOBIN GLYCOSYLATED A1C: CPT

## 2024-04-08 PROCEDURE — 81003 URINALYSIS AUTO W/O SCOPE: CPT

## 2024-04-10 ENCOUNTER — TELEPHONE (OUTPATIENT)
Dept: RADIOLOGY | Facility: HOSPITAL | Age: 54
End: 2024-04-10

## 2024-04-10 ENCOUNTER — CONSULT (OUTPATIENT)
Dept: FAMILY MEDICINE CLINIC | Facility: CLINIC | Age: 54
End: 2024-04-10
Payer: COMMERCIAL

## 2024-04-10 ENCOUNTER — HOSPITAL ENCOUNTER (OUTPATIENT)
Dept: RADIOLOGY | Facility: HOSPITAL | Age: 54
Discharge: HOME | End: 2024-04-10
Payer: COMMERCIAL

## 2024-04-10 VITALS
BODY MASS INDEX: 31.15 KG/M2 | HEART RATE: 82 BPM | HEIGHT: 66 IN | SYSTOLIC BLOOD PRESSURE: 124 MMHG | WEIGHT: 193.8 LBS | DIASTOLIC BLOOD PRESSURE: 76 MMHG | RESPIRATION RATE: 18 BRPM | TEMPERATURE: 97.1 F | OXYGEN SATURATION: 98 %

## 2024-04-10 DIAGNOSIS — I67.1 ANTERIOR COMMUNICATING ARTERY ANEURYSM: Primary | ICD-10-CM

## 2024-04-10 DIAGNOSIS — Z56.6 STRESS AT WORK: ICD-10-CM

## 2024-04-10 DIAGNOSIS — R73.03 PREDIABETES: ICD-10-CM

## 2024-04-10 DIAGNOSIS — I67.1 ANEURYSM OF ANTERIOR COMMUNICATING ARTERY: ICD-10-CM

## 2024-04-10 DIAGNOSIS — Z01.810 PREOP CARDIOVASCULAR EXAM: ICD-10-CM

## 2024-04-10 DIAGNOSIS — Z01.812 PRE-OPERATIVE LABORATORY EXAMINATION: ICD-10-CM

## 2024-04-10 DIAGNOSIS — F51.02 ADJUSTMENT INSOMNIA: ICD-10-CM

## 2024-04-10 DIAGNOSIS — Z79.02 ENCOUNTER FOR MONITORING ANTIPLATELET THERAPY: Primary | ICD-10-CM

## 2024-04-10 DIAGNOSIS — Z51.81 ENCOUNTER FOR MONITORING ANTIPLATELET THERAPY: Primary | ICD-10-CM

## 2024-04-10 DIAGNOSIS — J45.41 MODERATE PERSISTENT ASTHMA WITH ACUTE EXACERBATION: ICD-10-CM

## 2024-04-10 DIAGNOSIS — E03.9 ACQUIRED HYPOTHYROIDISM: ICD-10-CM

## 2024-04-10 PROCEDURE — 99215 OFFICE O/P EST HI 40 MIN: CPT | Performed by: FAMILY MEDICINE

## 2024-04-10 PROCEDURE — 71046 X-RAY EXAM CHEST 2 VIEWS: CPT

## 2024-04-10 PROCEDURE — 93000 ELECTROCARDIOGRAM COMPLETE: CPT | Performed by: FAMILY MEDICINE

## 2024-04-10 RX ORDER — CLOPIDOGREL BISULFATE 75 MG/1
75 TABLET ORAL DAILY
Qty: 30 TABLET | Refills: 2 | Status: SHIPPED | OUTPATIENT
Start: 2024-04-10

## 2024-04-10 RX ORDER — ASPIRIN 325 MG
325 TABLET ORAL DAILY
Qty: 90 TABLET | Refills: 3 | Status: SHIPPED | OUTPATIENT
Start: 2024-04-10

## 2024-04-10 SDOH — HEALTH STABILITY - MENTAL HEALTH: OTHER PHYSICAL AND MENTAL STRAIN RELATED TO WORK: Z56.6

## 2024-04-10 NOTE — PRE-PROCEDURE INSTRUCTIONS
Pre-procedure Instructions for Interventional Radiology  [address] [address]  INTERVENTIONAL RADIOLOGY [phone]    You are scheduled for a/an cerebral angiogram/intervention.    On Wednesday 4-17-24.    Your tentative arrival time is 8am.  Short stay will notify you the day before your procedure with the exact arrival time and the location to arrive.    To prepare for your procedure:  Please arrange for someone to drive you home after the procedure and stay with you until the next morning if you are instructed to do so.  This is typically for patients receiving some type of sedative or anesthetic for the procedure.  DO NOT EAT OR DRINK ANYTHING after midnight on the evening before your procedure including candy & gum.  ONLY SIPS OF WATER with your medications are allowed on the morning of your procedure.  TAKE ALL OF YOUR REGULAR MEDICATIONS THE MORNING OF YOUR PROCEDURE with sips of water!  We may call you to stop some of your blood sugar, blood pressure and blood thinning medications depending on the procedure.  Please take all of these medications unless we instruct you to stop them.  If you have an allergy to x-ray dye, please contact Interventional Radiology for an x-ray dye preparation which usually consists of an oral steroid and Benadryl.    The day of your procedure:  Bring a list of the medications you take at home.  Bring medications you take for breathing problems (such as inhalers), medications for chest pain, or both.  Bring a case for your glasses or contacts.  Bring your insurance card and a form of photo ID.  Please leave all valuables such as credit cards and jewelry at home.  Report to the admitting office to the left of the registration desk in the main lobby at the [facility], Entrance B.  You will then be directed to the Short Stay Center.  While your procedure is being performed, your family may wait in the Radiology Waiting Room on the  1st floor in Radiology.  if they need to leave, they may provide a number to be called following the procedure.   Be prepared to stay overnight just in case. Sometimes procedures will indicate the need for further observation or treatment.   If you are scheduled for a follow-up visit with the Interventional Radiologist after your procedure, you will be called with a date and time.    Special Instructions (Medications to stop taking before your procedure etc.):  Start aspirin and Plavix on 4-10-24.  Have P2Y12 collected at [facility] lab on 4-16-24.  Anticipate overnight stay in hospital.

## 2024-04-10 NOTE — PROGRESS NOTES
PRE-OPERATIVE EVALUATION  [facility] MEDICINE    NAME: Aleah Newton  AGE: 53 y.o. SEX: female  : 1970     DATE: 2024     Pre-Operative Evaluation      Chief Complaint: Pre-operative Evaluation     Surgery: IR cerebral angiography  Anticipated Date of Surgery: 2024   Referring Provider: Dr Salter     History of Present Illness:     Aleah Newton is a 53 y.o. female who presents to the office today for a preoperative consultation at the request of surgeon, Dr Saletr, who plans on performing IR cerebral angiogram on 2024. Planned anesthesia is Monitored [facility]est[facility] . Patient has a bleeding risk of: no recent abnormal bleeding. Patient does not have objections to receiving blood products if needed. Current anti-platelet/anti-coagulation medications that the patient is prescribed includes: Aspirin and Clopidogrel (Plavix).      Assessment of Chronic Conditions:   Diagnoses and all orders for this visit:    Anterior communicating artery aneurysm    Preop cardiovascular exam  -     XR chest pa & lateral; Future  -     POCT ECG    Acquired hypothyroidism    Moderate persistent asthma with acute exacerbation    Prediabetes    Adjustment insomnia    Stress at work      Controlled blood pressure today, on daily ambien  Is planning to undergo endovascular embolization utilizing the WEB device for 5 mm ACOM aneurysm     Assessment of Cardiac Risk:  Denies unstable or severe angina or MI in the last 6 weeks or history of stent placement in the last year   Denies decompensated heart failure (e.g. New onset heart failure, NYHA functional class IV heart failure, or worsening existing heart failure)  Denies significant arrhythmias such as high grade AV block, symptomatic ventricular arrhythmia, newly recognized ventricular tachycardia, supraventricular tachycardia with resting heart rate >100, or symptomatic bradycardia  Denies severe heart valve disease including aortic stenosis  or symptomatic mitral stenosis     Exercise Capacity:  Able to walk 4 blocks without symptoms?: Yes  Able to walk 2 flights without symptoms?: Yes    Prior Anesthesia Reactions: No     Personal history of venous thromboembolic disease? No    History of steroid use for >2 weeks within last year? No    STOP-BANG Sleep Apnea Screening Questionnaire:  negative sleep study       Review of Systems:     Review of Systems   Constitutional:  Negative for fatigue and fever.   HENT:  Negative for congestion, facial swelling, mouth sores, rhinorrhea, sore throat and trouble swallowing.    Eyes:  Negative for pain and redness.   Respiratory:  Negative for cough, shortness of breath and wheezing.    Cardiovascular:  Negative for chest pain, palpitations and leg swelling.   Gastrointestinal:  Negative for abdominal pain, blood in stool, constipation, diarrhea and nausea.   Genitourinary:  Negative for dysuria, hematuria and urgency.   Musculoskeletal:  Negative for arthralgias, back pain and myalgias.   Skin:  Negative for rash and wound.   Neurological:  Positive for headaches. Negative for dizziness, seizures and syncope.   Hematological:  Negative for adenopathy.   Psychiatric/Behavioral:  Negative for agitation, behavioral problems, sleep disturbance and suicidal ideas. The patient is nervous/anxious.         Stress+       Current Problem List:     Patient Active Problem List   Diagnosis    Mass of right axilla    Family history of breast cancer    Family history of colon cancer    Acquired hypothyroidism    Moderate persistent asthma with acute exacerbation    Chronic pain of both ankles    Flat foot    Patellofemoral arthritis of right knee    Lichen sclerosus    Elevated blood pressure reading in office without diagnosis of hypertension    Cough present for greater than 3 weeks    Anterior communicating artery aneurysm    Skin rash    IFG (impaired fasting glucose)    Adjustment insomnia    Stress at work       Allergies:      Allergies   Allergen Reactions    Niacin Hives     In high doses   (Flushing)    Mucinex Fast-Max Chest Scott Ms [Guaifenesin] Palpitations       Current Medications:       Current Outpatient Medications:     ALBUTEROL IN, Inhale if needed, Disp: , Rfl:     fluocinonide (LIDEX) 0.05 % cream, Apply twice a day to the back as needed only when pink and itchy., Disp: 60 g, Rfl: 3    hydroquinone 4 % cream, Apply topically 2 (two) times a day To the face (dark area) only., Disp: 30 g, Rfl: 0    levothyroxine 50 mcg tablet, TAKE 1 TABLET BY MOUTH EVERY DAY, Disp: 90 tablet, Rfl: 1    Multiple Vitamin (MULTI-VITAMIN DAILY PO), Take by mouth in the morning, Disp: , Rfl:     Zolpidem Tartrate (AMBIEN PO), Take by mouth, Disp: , Rfl:     aspirin 325 mg tablet, Take 1 tablet (325 mg total) by mouth daily, Disp: 90 tablet, Rfl: 3    clopidogrel (PLAVIX) 75 mg tablet, Take 1 tablet (75 mg total) by mouth daily, Disp: 30 tablet, Rfl: 2    loratadine-pseudoephedrine (CLARITIN-D 12-HOUR) 5-120 mg per tablet, Take 1 tablet by mouth daily as needed for allergies (Patient not taking: Reported on 4/10/2024), Disp: 40 tablet, Rfl: 1    Past Medical History:       Past Medical History:   Diagnosis Date    Abnormal Pap smear of cervix     Anemia     Arthritis     Asthma     Colon polyp     History of vertigo     Hyperlipemia     triglycerides    Hypertriglyceridemia     Hypothyroidism     Impaired fasting glucose     Insomnia     Lab test positive for detection of COVID-19 virus 12/22/2021    Premenstrual tension syndrome     Tendonitis         Past Surgical History:   Procedure Laterality Date    COLONOSCOPY  2016    COLONOSCOPY  11/04/2023    IR CEREBRAL ANGIOGRAPHY  1/8/2024    MAMMO (HISTORICAL) Bilateral 11/03/2020    WISDOM TOOTH EXTRACTION          Family History   Problem Relation Age of Onset    Colon cancer Mother 72    Diabetes Mother     Hypertension Mother     Heart disease Father     Hyperlipidemia Father     Heart attack  Father     Heart failure Father     Alzheimer's disease Maternal Aunt     No Known Problems Son     No Known Problems Sister     Breast cancer Maternal Grandmother         50's ?    No Known Problems Maternal Grandfather     Brain cancer Paternal Grandmother         young age    No Known Problems Paternal Grandfather     No Known Problems Paternal Uncle         Social History     Socioeconomic History    Marital status: /Civil Union     Spouse name: Not on file    Number of children: Not on file    Years of education: 4 year college    Highest education level: Not on file   Occupational History    Occupation:    Tobacco Use    Smoking status: Never    Smokeless tobacco: Never   Vaping Use    Vaping status: Never Used   Substance and Sexual Activity    Alcohol use: Yes     Comment: social     Drug use: Never    Sexual activity: Yes     Partners: Male     Birth control/protection: Male Sterilization     Comment: Partner vasectomy   Other Topics Concern    Not on file   Social History Narrative    · Most recent tobacco use screenin2018      · Do you currently or have you served in the [employer]:   No      · Were you activated, into active duty, as a member of the [employer] or as a Reservist:   No      · General stress level:   High      · Exercise level:   Moderate      · Diet:   Regular      · Marital status:     since      · Sexual orientation:   Heterosexual      · Alcohol intake:   Occasional      · Live alone or with others:   with others      · Caffeine intake:   None      · Do you feel safe at home:   Yes      Social Determinants of Health     Financial Resource Strain: Low Risk  (2023)    Overall Financial Resource Strain (CARDIA)     Difficulty of Paying Living Expenses: Not hard at all   Food Insecurity: No Food Insecurity (2023)    Hunger Vital Sign     Worried About Running Out of Food in the Last Year: Never true     Ran Out of Food in the Last  Year: Never true   Transportation Needs: No Transportation Needs (8/8/2023)    PRAPARE - Transportation     Lack of Transportation (Medical): No     Lack of Transportation (Non-Medical): No   Physical Activity: Sufficiently Active (8/8/2023)    Exercise Vital Sign     Days of Exercise per Week: 5 days     Minutes of Exercise per Session: 30 min   Stress: No Stress Concern Present (8/8/2023)    [employer] [place] [employer] - Occupational Stress Questionnaire     Feeling of Stress : Not at all   Social Connections: Not on file   Intimate Partner Violence: Not At Risk (8/8/2023)    Humiliation, Afraid, Rape, and Kick questionnaire     Fear of Current or Ex-Partner: No     Emotionally Abused: No     Physically Abused: No     Sexually Abused: No   Housing Stability: Low Risk  (8/8/2023)    Housing Stability Vital Sign     Unable to Pay for Housing in the Last Year: No     Number of Places Lived in the Last Year: 1     Unstable Housing in the Last Year: No        Physical Exam:     Vitals:    04/10/24 0823   BP: 124/76   Pulse: 82   Resp: 18   Temp: (!) 97.1 °F (36.2 °C)   SpO2: 98%     Physical Exam  Vitals and nursing note reviewed.   Constitutional:       Appearance: Normal appearance. She is well-developed. She is not ill-appearing.   HENT:      Head: Normocephalic and atraumatic.      Right Ear: External ear normal.      Left Ear: External ear normal.      Nose: Nose normal.      Mouth/Throat:      Mouth: Mucous membranes are moist.      Pharynx: No oropharyngeal exudate or posterior oropharyngeal erythema.   Eyes:      General: No scleral icterus.        Right eye: No discharge.         Left eye: No discharge.      Conjunctiva/sclera: Conjunctivae normal.   Cardiovascular:      Rate and Rhythm: Normal rate.      Heart sounds: No murmur heard.     No gallop.   Pulmonary:      Effort: Pulmonary effort is normal. No respiratory distress.      Breath sounds: Normal breath sounds. No stridor. No wheezing,  rhonchi or rales.   Abdominal:      Palpations: Abdomen is soft.      Tenderness: There is no abdominal tenderness.   Musculoskeletal:         General: No tenderness or deformity.   Skin:     Findings: No erythema or rash.   Neurological:      Mental Status: She is alert. Mental status is at baseline.   Psychiatric:         Mood and Affect: Mood is anxious and depressed. Affect is tearful.         Behavior: Behavior normal.         Judgment: Judgment normal.      Comments: Mainly work related stress and upcoming surgery          Data:     Pre-operative work-up    Recent Results (from the past 2016 hour(s))   CBC and differential    Collection Time: 04/08/24  7:51 AM   Result Value Ref Range    WBC 7.63 4.31 - 10.16 Thousand/uL    RBC 4.79 3.81 - 5.12 Million/uL    Hemoglobin 12.8 11.5 - 15.4 g/dL    Hematocrit 41.2 34.8 - 46.1 %    MCV 86 82 - 98 fL    MCH 26.7 (L) 26.8 - 34.3 pg    MCHC 31.1 (L) 31.4 - 37.4 g/dL    RDW 14.2 11.6 - 15.1 %    MPV 11.5 8.9 - 12.7 fL    Platelets 182 149 - 390 Thousands/uL    nRBC 0 /100 WBCs    Segmented % 49 43 - 75 %    Immature Grans % 0 0 - 2 %    Lymphocytes % 39 14 - 44 %    Monocytes % 7 4 - 12 %    Eosinophils Relative 4 0 - 6 %    Basophils Relative 1 0 - 1 %    Absolute Neutrophils 3.74 1.85 - 7.62 Thousands/µL    Absolute Immature Grans 0.02 0.00 - 0.20 Thousand/uL    Absolute Lymphocytes 3.00 0.60 - 4.47 Thousands/µL    Absolute Monocytes 0.54 0.17 - 1.22 Thousand/µL    Eosinophils Absolute 0.29 0.00 - 0.61 Thousand/µL    Basophils Absolute 0.04 0.00 - 0.10 Thousands/µL   Comprehensive metabolic panel    Collection Time: 04/08/24  7:51 AM   Result Value Ref Range    Sodium 141 135 - 147 mmol/L    Potassium 4.4 3.5 - 5.3 mmol/L    Chloride 105 96 - 108 mmol/L    CO2 27 21 - 32 mmol/L    ANION GAP 9 4 - 13 mmol/L    BUN 13 5 - 25 mg/dL    Creatinine 0.80 0.60 - 1.30 mg/dL    Glucose, Fasting 110 (H) 65 - 99 mg/dL    Calcium 9.6 8.4 - 10.2 mg/dL    AST 14 13 - 39 U/L    ALT  11 7 - 52 U/L    Alkaline Phosphatase 55 34 - 104 U/L    Total Protein 7.5 6.4 - 8.4 g/dL    Albumin 4.3 3.5 - 5.0 g/dL    Total Bilirubin 0.66 0.20 - 1.00 mg/dL    eGFR 83 ml/min/1.73sq m   Protime-INR    Collection Time: 04/08/24  7:51 AM   Result Value Ref Range    Protime 13.1 11.6 - 14.5 seconds    INR 1.00 0.84 - 1.19   APTT    Collection Time: 04/08/24  7:51 AM   Result Value Ref Range    PTT 32 23 - 37 seconds   UA (URINE) with reflex to Scope    Collection Time: 04/08/24  7:51 AM   Result Value Ref Range    Color, UA Yellow Yellow    Clarity, UA Clear Clear    Specific Gravity, UA 1.025 1.001 - 1.030    pH, UA 6.0 5.0, 5.5, 6.0, 6.5, 7.0, 7.5, 8.0    Leukocytes, UA Negative Negative    Nitrite, UA Negative Negative    Protein, UA Negative Negative, Interference- unable to analyze mg/dl    Glucose, UA Negative Negative mg/dl    Ketones, UA Negative Negative mg/dl    Urobilinogen, UA 0.2 0.2, 1.0 E.U./dl E.U./dl    Bilirubin, UA Negative Negative    Occult Blood, UA 2+ (A) Negative   HEMOGLOBIN A1C W/ EAG ESTIMATION    Collection Time: 04/08/24  7:51 AM   Result Value Ref Range    Hemoglobin A1C 6.0 (H) Normal 4.0-5.6%; PreDiabetic 5.7-6.4%; Diabetic >=6.5%; Glycemic control for adults with diabetes <7.0% %     mg/dl   Urine Microscopic    Collection Time: 04/08/24  7:51 AM   Result Value Ref Range    RBC, UA 2-4 None Seen, 0-1, 1-2, 2-4, 0-5 /hpf    WBC, UA 0-1 None Seen, 0-1, 1-2, 0-5, 2-4 /hpf    Epithelial Cells Innumerable (A) None Seen, Occasional /hpf    Bacteria, UA Moderate (A) None Seen, Occasional /hpf    MUCUS THREADS Moderate (A) None Seen       Laboratory Results: I have personally reviewed the pertinent laboratory results/reports     EKG: I have personally reviewed pertinent reports.   NSR, no acute ST-T wave changes    Chest x-ray: I have personally reviewed pertinent reports.   No acute cardiopulmonary findings    Previous cardiopulmonary studies within the past year:  Echocardiogram:  none  Cardiac Catheterization: none  Stress Test: none  Pulmonary Function Testing: none      Assessment & Recommendations:     1. Anterior communicating artery aneurysm        2. Preop cardiovascular exam  XR chest pa & lateral    POCT ECG      3. Acquired hypothyroidism        4. Moderate persistent asthma with acute exacerbation        5. Prediabetes        6. Adjustment insomnia        7. Stress at work            Pre-Op Evaluation Assessment  53 y.o. female with planned surgery: IR cerebral angiography.    Known risk factors for perioperative complications: None.      Cardiac Risk Estimation: per the Revised Cardiac Risk Index (Circ. 100:1043, 1999), the patient's risk factors for cardiac complications include  low risk surgery , putting her in: RCI RISK CLASS I (0 risk factors, risk of major cardiac compl. appr. 0.5%).    Current medications which may produce withdrawal symptoms if withheld perioperatively: Ambien.    I discussed with patient that per the ACC and AHA guidelines for perioperative cardiovascular evaluation of the noncardiac surgery patient, he is going to be undergoing an elective procedure and has not had any coronary revascularization within the last 5 years nor recent coronary evaluation. As a result, I utilized clinical predictors and his functional capacity as a means of assessing him for perioperative risk. He has minor clinical predictors and, by his description, at least moderate functional capacity. Therefore, he is able to proceed to the operating room at this time with a perceived low risk of perioperative cardiac complications Return to the office, otherwise, as needed or for next regular checkup.      Pre-Op Evaluation Plan  1. Further preoperative workup as follows:   - Chest x-ray  - ECG  - Complete blood count  - Basic metabolic profile  - Hepatic function panel  - Coagulation studies    2. Medication Management/Recommendations:   - None, continue medication regimen including  morning of surgery, with sip of water  - Patient has been instructed to avoid herbs or non-directed vitamins the week prior to surgery to ensure no drug interactions with perioperative surgical and anesthetic medications.  - Patient has been instructed to avoid aspirin containing medications or non-steroidal anti-inflammatory drugs for the week preceding surgery.  - Regarding anti-platelet agents: per neurosurgery.    3. Prophylaxis for cardiac events with perioperative beta-blockers: should be considered, specific regimen per anesthesia.    4. Patient requires further consultation with: None    Clearance  Patient is CLEARED for surgery without any additional cardiac testing.     Sanjuanita Michelle MD  [address] [address]  Phone#  [phone]  Fax#  [phone]

## 2024-04-10 NOTE — TELEPHONE ENCOUNTER
Contacted Aleah S Reemario to discuss starting her DAPT in preparation for her upcoming angio/intervention.     Explained that she should begin taking  mg and Plavix 75 mg today and continue to take this medication following the procedure until advised otherwise by the physician. She confirmed preferred pharmacy.      P2Y12 level should be drawn the day before the procedure at [facility]. She will have this completed on Tuesday.     She stated an understanding and was appreciative.

## 2024-04-11 PROBLEM — Z56.6 STRESS AT WORK: Status: ACTIVE | Noted: 2024-04-11

## 2024-04-11 PROBLEM — F51.02 ADJUSTMENT INSOMNIA: Status: ACTIVE | Noted: 2024-04-11

## 2024-04-16 ENCOUNTER — APPOINTMENT (OUTPATIENT)
Dept: LAB | Facility: CLINIC | Age: 54
End: 2024-04-16
Payer: COMMERCIAL

## 2024-04-16 DIAGNOSIS — Z79.02 ENCOUNTER FOR MONITORING ANTIPLATELET THERAPY: ICD-10-CM

## 2024-04-16 DIAGNOSIS — Z51.81 ENCOUNTER FOR MONITORING ANTIPLATELET THERAPY: ICD-10-CM

## 2024-04-16 DIAGNOSIS — I67.1 ANEURYSM OF ANTERIOR COMMUNICATING ARTERY: Primary | ICD-10-CM

## 2024-04-16 DIAGNOSIS — Z01.812 PRE-OPERATIVE LABORATORY EXAMINATION: ICD-10-CM

## 2024-04-16 LAB — PA ADP BLD-ACNC: 207 PRU (ref 194–418)

## 2024-04-16 PROCEDURE — 85576 BLOOD PLATELET AGGREGATION: CPT

## 2024-04-16 PROCEDURE — 36415 COLL VENOUS BLD VENIPUNCTURE: CPT

## 2024-04-16 RX ORDER — ASPIRIN 81 MG/1
81 TABLET ORAL DAILY
Qty: 90 TABLET | Refills: 1 | Status: SHIPPED | OUTPATIENT
Start: 2024-04-16

## 2024-04-16 NOTE — TELEPHONE ENCOUNTER
Contacted Aleah after reviewing results of P2Y12 which returned 207, this is outside therapeutic range for procedure tomorrow. She reports that she has been taking the ASA and Plavix since we spoke on 4/10. She will need to transition to alternate DAPT regimen. Placed orders to be eRx to confirmed pharmacy.     Advised Aleah that she should take her first dose of Brilinta today and should take 2 tabs upon picking it up. She will then transition to Q12h dosing and ASA81 daily. She already took her IGL025 today so will not have to take another dose until tomorrow.     She was encourage to call if she had questions or concerns.

## 2024-04-17 ENCOUNTER — ANESTHESIA (OUTPATIENT)
Dept: RADIOLOGY | Facility: HOSPITAL | Age: 54
DRG: 027 | End: 2024-04-17
Payer: COMMERCIAL

## 2024-04-17 ENCOUNTER — ANESTHESIA EVENT (OUTPATIENT)
Dept: RADIOLOGY | Facility: HOSPITAL | Age: 54
DRG: 027 | End: 2024-04-17
Payer: COMMERCIAL

## 2024-04-17 ENCOUNTER — HOSPITAL ENCOUNTER (INPATIENT)
Dept: RADIOLOGY | Facility: HOSPITAL | Age: 54
LOS: 1 days | Discharge: HOME | DRG: 027 | End: 2024-04-18
Attending: RADIOLOGY | Admitting: RADIOLOGY
Payer: COMMERCIAL

## 2024-04-17 DIAGNOSIS — I67.1 ANEURYSM OF ANTERIOR COMMUNICATING ARTERY: Primary | ICD-10-CM

## 2024-04-17 LAB
ABO GROUP BLD: NORMAL
BLD GP AB SCN SERPL QL: NEGATIVE
KCT BLD-ACNC: 165 SEC (ref 89–137)
KCT BLD-ACNC: 217 SEC (ref 89–137)
PA ADP BLD-ACNC: 7 PRU (ref 194–418)
RH BLD: POSITIVE
SPECIMEN EXPIRATION DATE: NORMAL
SPECIMEN SOURCE: ABNORMAL
SPECIMEN SOURCE: ABNORMAL

## 2024-04-17 PROCEDURE — C1769 GUIDE WIRE: HCPCS

## 2024-04-17 PROCEDURE — C1894 INTRO/SHEATH, NON-LASER: HCPCS

## 2024-04-17 PROCEDURE — NC001 PR NO CHARGE: Performed by: RADIOLOGY

## 2024-04-17 PROCEDURE — 85576 BLOOD PLATELET AGGREGATION: CPT | Performed by: RADIOLOGY

## 2024-04-17 PROCEDURE — 36224 PLACE CATH CAROTD ART: CPT | Performed by: RADIOLOGY

## 2024-04-17 PROCEDURE — 86901 BLOOD TYPING SEROLOGIC RH(D): CPT | Performed by: NURSE ANESTHETIST, CERTIFIED REGISTERED

## 2024-04-17 PROCEDURE — 36224 PLACE CATH CAROTD ART: CPT

## 2024-04-17 PROCEDURE — 86900 BLOOD TYPING SEROLOGIC ABO: CPT | Performed by: NURSE ANESTHETIST, CERTIFIED REGISTERED

## 2024-04-17 PROCEDURE — 61624 TCAT PERM OCCLS/EMBOLJ CNS: CPT | Performed by: RADIOLOGY

## 2024-04-17 PROCEDURE — C1887 CATHETER, GUIDING: HCPCS

## 2024-04-17 PROCEDURE — 61624 TCAT PERM OCCLS/EMBOLJ CNS: CPT

## 2024-04-17 PROCEDURE — B31RYZZ FLUOROSCOPY OF INTRACRANIAL ARTERIES USING OTHER CONTRAST: ICD-10-PCS | Performed by: RADIOLOGY

## 2024-04-17 PROCEDURE — B316YZZ FLUOROSCOPY OF RIGHT INTERNAL CAROTID ARTERY USING OTHER CONTRAST: ICD-10-PCS | Performed by: RADIOLOGY

## 2024-04-17 PROCEDURE — 03LG3DZ OCCLUSION OF INTRACRANIAL ARTERY WITH INTRALUMINAL DEVICE, PERCUTANEOUS APPROACH: ICD-10-PCS | Performed by: RADIOLOGY

## 2024-04-17 PROCEDURE — C1760 CLOSURE DEV, VASC: HCPCS

## 2024-04-17 PROCEDURE — 85347 COAGULATION TIME ACTIVATED: CPT

## 2024-04-17 PROCEDURE — 99223 1ST HOSP IP/OBS HIGH 75: CPT | Performed by: EMERGENCY MEDICINE

## 2024-04-17 PROCEDURE — 76937 US GUIDE VASCULAR ACCESS: CPT | Performed by: RADIOLOGY

## 2024-04-17 PROCEDURE — 36227 PLACE CATH XTRNL CAROTID: CPT

## 2024-04-17 PROCEDURE — 75898 FOLLOW-UP ANGIOGRAPHY: CPT | Performed by: RADIOLOGY

## 2024-04-17 PROCEDURE — 75894 X-RAYS TRANSCATH THERAPY: CPT | Performed by: RADIOLOGY

## 2024-04-17 PROCEDURE — 86850 RBC ANTIBODY SCREEN: CPT | Performed by: NURSE ANESTHETIST, CERTIFIED REGISTERED

## 2024-04-17 PROCEDURE — 76377 3D RENDER W/INTRP POSTPROCES: CPT | Performed by: RADIOLOGY

## 2024-04-17 RX ORDER — HEPARIN SODIUM 1000 [USP'U]/ML
INJECTION, SOLUTION INTRAVENOUS; SUBCUTANEOUS AS NEEDED
Status: DISCONTINUED | OUTPATIENT
Start: 2024-04-17 | End: 2024-04-17

## 2024-04-17 RX ORDER — LABETALOL HYDROCHLORIDE 5 MG/ML
10 INJECTION, SOLUTION INTRAVENOUS ONCE
Status: COMPLETED | OUTPATIENT
Start: 2024-04-17 | End: 2024-04-17

## 2024-04-17 RX ORDER — ONDANSETRON 2 MG/ML
4 INJECTION INTRAMUSCULAR; INTRAVENOUS ONCE AS NEEDED
Status: CANCELLED | OUTPATIENT
Start: 2024-04-17

## 2024-04-17 RX ORDER — ROCURONIUM BROMIDE 10 MG/ML
INJECTION, SOLUTION INTRAVENOUS AS NEEDED
Status: DISCONTINUED | OUTPATIENT
Start: 2024-04-17 | End: 2024-04-17

## 2024-04-17 RX ORDER — FENTANYL CITRATE 50 UG/ML
INJECTION, SOLUTION INTRAMUSCULAR; INTRAVENOUS AS NEEDED
Status: DISCONTINUED | OUTPATIENT
Start: 2024-04-17 | End: 2024-04-17

## 2024-04-17 RX ORDER — LIDOCAINE HYDROCHLORIDE 10 MG/ML
INJECTION, SOLUTION EPIDURAL; INFILTRATION; INTRACAUDAL; PERINEURAL AS NEEDED
Status: COMPLETED | OUTPATIENT
Start: 2024-04-17 | End: 2024-04-17

## 2024-04-17 RX ORDER — LEVOTHYROXINE SODIUM 0.05 MG/1
50 TABLET ORAL
Status: DISCONTINUED | OUTPATIENT
Start: 2024-04-18 | End: 2024-04-18 | Stop reason: HOSPADM

## 2024-04-17 RX ORDER — FENTANYL CITRATE/PF 50 MCG/ML
25 SYRINGE (ML) INJECTION
Status: CANCELLED | OUTPATIENT
Start: 2024-04-17

## 2024-04-17 RX ORDER — EPHEDRINE SULFATE 50 MG/ML
INJECTION INTRAVENOUS AS NEEDED
Status: DISCONTINUED | OUTPATIENT
Start: 2024-04-17 | End: 2024-04-17

## 2024-04-17 RX ORDER — CHLORHEXIDINE GLUCONATE ORAL RINSE 1.2 MG/ML
15 SOLUTION DENTAL 2 TIMES DAILY
Status: DISCONTINUED | OUTPATIENT
Start: 2024-04-17 | End: 2024-04-18 | Stop reason: HOSPADM

## 2024-04-17 RX ORDER — SODIUM CHLORIDE 9 MG/ML
75 INJECTION, SOLUTION INTRAVENOUS CONTINUOUS
Status: DISCONTINUED | OUTPATIENT
Start: 2024-04-17 | End: 2024-04-17

## 2024-04-17 RX ORDER — MIDAZOLAM HYDROCHLORIDE 2 MG/2ML
INJECTION, SOLUTION INTRAMUSCULAR; INTRAVENOUS AS NEEDED
Status: DISCONTINUED | OUTPATIENT
Start: 2024-04-17 | End: 2024-04-17

## 2024-04-17 RX ORDER — HYDROMORPHONE HCL IN WATER/PF 6 MG/30 ML
0.2 PATIENT CONTROLLED ANALGESIA SYRINGE INTRAVENOUS
Status: CANCELLED | OUTPATIENT
Start: 2024-04-17

## 2024-04-17 RX ORDER — SODIUM CHLORIDE 9 MG/ML
INJECTION, SOLUTION INTRAVENOUS CONTINUOUS PRN
Status: DISCONTINUED | OUTPATIENT
Start: 2024-04-17 | End: 2024-04-17

## 2024-04-17 RX ORDER — VERAPAMIL HYDROCHLORIDE 2.5 MG/ML
INJECTION, SOLUTION INTRAVENOUS AS NEEDED
Status: COMPLETED | OUTPATIENT
Start: 2024-04-17 | End: 2024-04-17

## 2024-04-17 RX ORDER — PROPOFOL 10 MG/ML
INJECTION, EMULSION INTRAVENOUS AS NEEDED
Status: DISCONTINUED | OUTPATIENT
Start: 2024-04-17 | End: 2024-04-17

## 2024-04-17 RX ORDER — ONDANSETRON 2 MG/ML
INJECTION INTRAMUSCULAR; INTRAVENOUS AS NEEDED
Status: DISCONTINUED | OUTPATIENT
Start: 2024-04-17 | End: 2024-04-17

## 2024-04-17 RX ORDER — DEXAMETHASONE SODIUM PHOSPHATE 10 MG/ML
INJECTION, SOLUTION INTRAMUSCULAR; INTRAVENOUS AS NEEDED
Status: DISCONTINUED | OUTPATIENT
Start: 2024-04-17 | End: 2024-04-17

## 2024-04-17 RX ORDER — ACETAMINOPHEN 325 MG/1
975 TABLET ORAL ONCE
Status: COMPLETED | OUTPATIENT
Start: 2024-04-17 | End: 2024-04-17

## 2024-04-17 RX ORDER — IODIXANOL 320 MG/ML
300 INJECTION, SOLUTION INTRAVASCULAR
Status: COMPLETED | OUTPATIENT
Start: 2024-04-17 | End: 2024-04-17

## 2024-04-17 RX ADMIN — SODIUM CHLORIDE: 0.9 INJECTION, SOLUTION INTRAVENOUS at 10:30

## 2024-04-17 RX ADMIN — LIDOCAINE HYDROCHLORIDE 50 ML: 10 INJECTION, SOLUTION EPIDURAL; INFILTRATION; INTRACAUDAL; PERINEURAL at 10:16

## 2024-04-17 RX ADMIN — SUGAMMADEX 350 MG: 100 INJECTION, SOLUTION INTRAVENOUS at 12:14

## 2024-04-17 RX ADMIN — ROCURONIUM BROMIDE 20 MG: 10 INJECTION, SOLUTION INTRAVENOUS at 11:00

## 2024-04-17 RX ADMIN — ONDANSETRON 4 MG: 2 INJECTION INTRAMUSCULAR; INTRAVENOUS at 10:16

## 2024-04-17 RX ADMIN — DEXAMETHASONE SODIUM PHOSPHATE 5 MG: 10 INJECTION, SOLUTION INTRAMUSCULAR; INTRAVENOUS at 11:42

## 2024-04-17 RX ADMIN — LABETALOL HYDROCHLORIDE 10 MG: 5 INJECTION, SOLUTION INTRAVENOUS at 17:30

## 2024-04-17 RX ADMIN — IODIXANOL 85 ML: 320 INJECTION, SOLUTION INTRAVASCULAR at 14:25

## 2024-04-17 RX ADMIN — ROCURONIUM BROMIDE 30 MG: 10 INJECTION, SOLUTION INTRAVENOUS at 10:17

## 2024-04-17 RX ADMIN — HEPARIN SODIUM 4000 UNITS: 1000 INJECTION INTRAVENOUS; SUBCUTANEOUS at 11:06

## 2024-04-17 RX ADMIN — FENTANYL CITRATE 25 MCG: 50 INJECTION INTRAMUSCULAR; INTRAVENOUS at 12:32

## 2024-04-17 RX ADMIN — TICAGRELOR 90 MG: 90 TABLET ORAL at 20:48

## 2024-04-17 RX ADMIN — ROCURONIUM BROMIDE 20 MG: 10 INJECTION, SOLUTION INTRAVENOUS at 11:55

## 2024-04-17 RX ADMIN — EPHEDRINE SULFATE 5 MG: 50 INJECTION, SOLUTION INTRAVENOUS at 10:57

## 2024-04-17 RX ADMIN — ROCURONIUM BROMIDE 20 MG: 10 INJECTION, SOLUTION INTRAVENOUS at 11:16

## 2024-04-17 RX ADMIN — EPHEDRINE SULFATE 5 MG: 50 INJECTION, SOLUTION INTRAVENOUS at 11:03

## 2024-04-17 RX ADMIN — SODIUM CHLORIDE 75 ML/HR: 0.9 INJECTION, SOLUTION INTRAVENOUS at 08:20

## 2024-04-17 RX ADMIN — SUGAMMADEX 50 MG: 100 INJECTION, SOLUTION INTRAVENOUS at 12:16

## 2024-04-17 RX ADMIN — FENTANYL CITRATE 50 MCG: 50 INJECTION INTRAMUSCULAR; INTRAVENOUS at 10:16

## 2024-04-17 RX ADMIN — MIDAZOLAM 1 MG: 1 INJECTION INTRAMUSCULAR; INTRAVENOUS at 10:01

## 2024-04-17 RX ADMIN — VERAPAMIL HYDROCHLORIDE 10 MG: 2.5 INJECTION INTRAVENOUS at 10:58

## 2024-04-17 RX ADMIN — PHENYLEPHRINE HYDROCHLORIDE 50 MCG/MIN: 10 INJECTION INTRAVENOUS at 10:57

## 2024-04-17 RX ADMIN — CHLORHEXIDINE GLUCONATE 0.12% ORAL RINSE 15 ML: 1.2 LIQUID ORAL at 20:48

## 2024-04-17 RX ADMIN — PROPOFOL 200 MG: 10 INJECTION, EMULSION INTRAVENOUS at 10:16

## 2024-04-17 RX ADMIN — ACETAMINOPHEN 975 MG: 325 TABLET, FILM COATED ORAL at 13:23

## 2024-04-17 RX ADMIN — LIDOCAINE HYDROCHLORIDE 10 ML: 10 INJECTION, SOLUTION EPIDURAL; INFILTRATION; INTRACAUDAL; PERINEURAL at 10:50

## 2024-04-17 RX ADMIN — CHLORHEXIDINE GLUCONATE 0.12% ORAL RINSE 15 ML: 1.2 LIQUID ORAL at 13:23

## 2024-04-17 NOTE — SEDATION DOCUMENTATION
Cerebral angiogram with intervention performed by Dr. Jorge Salter. Anestheisa present for case.  Angioseal used in right groin with dry dressing.  .IR Procedure Bedrest Start Time is 1215.Report called to ICU.

## 2024-04-17 NOTE — H&P
"H&P and labs reviewed.   /72 (BP Location: Right arm)   Pulse 74   Temp 98.3 °F (36.8 °C) (Oral)   Resp 16   Ht 5' 6\" (1.676 m)   Wt 86.2 kg (190 lb)   LMP 09/12/2022 Comment: no period for 18 months  SpO2 98%   BMI 30.67 kg/m²    After examining the patient I find no changes in the patients condition since the H&P had been written. Plan is for cerebral angiogram with embolization. Risks and benefits have been discussed and they have elected to proceed.        "

## 2024-04-17 NOTE — DISCHARGE INSTRUCTIONS
Today, you underwent a cerebral angiogram with intervention under the care of Dr. Jorge Salter for evaluation of Aneurysm.  ?  The following instructions will help you care for yourself, or be cared for upon your return home today. These are guidelines for your care right after your surgery only.   ?  Notify Your Doctor or Nurse if you have any of the following:  ?  SYMPTOMS OF WOUND INFECTION--   Increased pain in or around the incision   Swelling around the incision  Any drainage from the incision  Incision separates or opens up  Warmth in the tissues around the incision  Redness or tenderness on the skin near the incision   Fever (temperature greater than 101 degrees F)   ?  NEUROLOGICAL CHANGES--  Change in alertness  Increased sleepiness   Nausea and vomiting   New onset of numbness or weakness in arms or legs   New problems with your bowels or bladder  New or worse problems with balance or walking  Seizures, new or worsening  ?  UNRELIEVED HEADACHE PAIN--  New or increased pain unrelieved with pain medications   Pain associated with nausea and vomiting   Pain associated with other symptoms  ?  QUESTIONS OR PROBLEMS--  Any questions or problems that you are unsure about  Wound Care:  Keep Incision Clean and Dry   You may shower daily, but do not soak incision. Pat dry after showering.   No tub baths, soaking, swimming for 1 week after angiogram.   You do not need to cover the incision. Mild to moderate bruising and tenderness to the site is expected and may last up to 1-2 weeks after your procedure.   ?  A closure device was placed at the catheter insertion site. This is MRI compatible. Remove the dressing 24 hours after your procedure.   If your groin site is bleeding, apply firm pressure for 10 minutes. Reinforce dressing rather than removing and checking frequently. If continues to bleed through the dressing after 1 hour, contact your neurosurgeon's office.   Anticipatory Education:  ?  PAIN MED W/  Acetaminophen (Tylenol)  --IF a prescription for pain medicine has been sent home with you:  --Narcotic pain medication may cause constipation. Be sure to take stool softeners or laxatives while you are on narcotic pain medication.   --Do not drive after taking prescription pain medicine.   ?  If this medicine is too strong, or no longer necessary, or we did NOT recommend/prescribe oral narcotics, you may take:   - Tylenol Extra-strength/Acetaminophen, 2 tablets every 4-6 hours as needed for mild pain. DO NOT TAKE MORE THAN 4000MG PER DAY from combined sources. NOTE: Remember to eat when taking pain medicines in order to avoid nausea. Watch for constipation. Eat plenty of fruits, vegetables, juices, and drink 6-8 glasses of water each day.   Constipation: Stay active and drink at least 6-8 cups of fluid each day to prevent constipation. If you need a laxative or stool softener follow the package directions or consult with your local pharmacists if you have questions.  ?  After anesthesia, rest for 24 hours. Do not drive, drink alcohol beverages or make any important decisions during this time. General anesthesia may cause sore throat, jaw discomfort or muscle aches. These symptoms can last for one or two days.  Activity: Please follow these instructions:  Advance your activity as you can tolerate. You may do light house work; nothing strenuous   You may walk all you want. You may go up and down the steps. Use the railing for support  Do not do excessive bending, straining or heavy lifting for 48 hours after your procedure  Do not drive or return to work until you are instructed   It is normal for your energy level and sleep patterns to change after surgery.   Get extra sleep at night and take naps during the day to help you feel less tired.   Take rest periods during the day.   Complete recovery may take several weeks.  ?  You may resume driving after 24-48 hours recovery.   You may return to work after 48 hours of  recovery.   ?  Diet:  Your doctor has recommended that you follow these diet instructions at home. Refer to the patient education materials you received during your hospital stay. If you would like more nutrition counseling, ask your doctor about making an appointment with an outpatient dietitian.  Resume your home diet  ?  Medications:  Please resume your home medications as instructed.   ?  Home Supplies and Equipment:  none  Additional Contacts:  ?  CONTACTS FOR NEUROSURGERY: You may call your neurosurgeon’s office if you have questions between 8:30 am and 4:30 pm. You may request to speak to the nurse practitioner who is available Monday through Friday.   ?  For off hours or the weekend you may call your neurosurgeon's office to leave a message.

## 2024-04-17 NOTE — ANESTHESIA PROCEDURE NOTES
Arterial Line Insertion    Performed by: Thanh Alcantar CRNA  Authorized by: Jhonny Barton MD  Consent: Written consent obtained.  Consent given by: patient  Patient identity confirmed: anonymous protocol, patient vented/unresponsive  Preparation: Patient was prepped and draped in the usual sterile fashion.  Indications: hemodynamic monitoring  Orientation:  Left  Location: radial artery  Procedure Details:  Narendra's test normal: yes  Needle gauge: 20  Seldinger technique: Seldinger technique used  Number of attempts: 1    Post-procedure:  Post-procedure: dressing applied  Waveform: good waveform  Post-procedure CNS: normal and unchanged  Patient tolerance: Patient tolerated the procedure well with no immediate complications

## 2024-04-17 NOTE — PLAN OF CARE
Problem: PAIN - ADULT  Goal: Verbalizes/displays adequate comfort level or baseline comfort level  Description: Interventions:  - Encourage patient to monitor pain and request assistance  - Assess pain using appropriate pain scale  - Administer analgesics based on type and severity of pain and evaluate response  - Implement non-pharmacological measures as appropriate and evaluate response  - Consider cultural and social influences on pain and pain management  - Notify physician/advanced practitioner if interventions unsuccessful or patient reports new pain  Outcome: Progressing     Problem: INFECTION - ADULT  Goal: Absence or prevention of progression during hospitalization  Description: INTERVENTIONS:  - Assess and monitor for signs and symptoms of infection  - Monitor lab/diagnostic results  - Monitor all insertion sites, i.e. indwelling lines, tubes, and drains  - Monitor endotracheal if appropriate and nasal secretions for changes in amount and color  - [employer] appropriate cooling/warming therapies per order  - Administer medications as ordered  - Instruct and encourage patient and family to use good hand hygiene technique  - Identify and instruct in appropriate isolation precautions for identified infection/condition  Outcome: Progressing  Goal: Absence of fever/infection during neutropenic period  Description: INTERVENTIONS:  - Monitor WBC    Outcome: Progressing     Problem: SAFETY ADULT  Goal: Patient will remain free of falls  Description: INTERVENTIONS:  - Educate patient/family on patient safety including physical limitations  - Instruct patient to call for assistance with activity   - Consult OT/PT to assist with strengthening/mobility   - Keep Call bell within reach  - Keep bed low and locked with side rails adjusted as appropriate  - Keep care items and personal belongings within reach  - Initiate and maintain comfort rounds  - Make Fall Risk Sign visible to staff  - Offer Toileting every  Hours, in  advance of need  - Initiate/Maintain alarm  - Obtain necessary fall risk management equipment:   - Apply yellow socks and bracelet for high fall risk patients  - Consider moving patient to room near nurses station  Outcome: Progressing  Goal: Maintain or return to baseline ADL function  Description: INTERVENTIONS:  -  Assess patient's ability to carry out ADLs; assess patient's baseline for ADL function and identify physical deficits which impact ability to perform ADLs (bathing, care of mouth/teeth, toileting, grooming, dressing, etc.)  - Assess/evaluate cause of self-care deficits   - Assess range of motion  - Assess patient's mobility; develop plan if impaired  - Assess patient's need for assistive devices and provide as appropriate  - Encourage maximum independence but intervene and supervise when necessary  - Involve family in performance of ADLs  - Assess for home care needs following discharge   - Consider OT consult to assist with ADL evaluation and planning for discharge  - Provide patient education as appropriate  Outcome: Progressing  Goal: Maintains/Returns to pre admission functional level  Description: INTERVENTIONS:  - Perform AM-PAC 6 Click Basic Mobility/ Daily Activity assessment daily.  - Set and communicate daily mobility goal to care team and patient/family/caregiver.   - Collaborate with rehabilitation services on mobility goals if consulted  - Perform Range of Motion  times a day.  - Reposition patient every  hours.  - Dangle patient  times a day  - Stand patient  times a day  - Ambulate patient  times a day  - Out of bed to chair  times a day   - Out of bed for meal times a day  - Out of bed for toileting  - Record patient progress and toleration of activity level   Outcome: Progressing     Problem: DISCHARGE PLANNING  Goal: Discharge to home or other facility with appropriate resources  Description: INTERVENTIONS:  - Identify barriers to discharge w/patient and caregiver  - Arrange for  needed discharge resources and transportation as appropriate  - Identify discharge learning needs (meds, wound care, etc.)  - Arrange for interpretive services to assist at discharge as needed  - Refer to Case Management Department for coordinating discharge planning if the patient needs post-hospital services based on physician/advanced practitioner order or complex needs related to functional status, cognitive ability, or social support system  Outcome: Progressing     Problem: Knowledge Deficit  Goal: Patient/family/caregiver demonstrates understanding of disease process, treatment plan, medications, and discharge instructions  Description: Complete learning assessment and assess knowledge base.  Interventions:  - Provide teaching at level of understanding  - Provide teaching via preferred learning methods  Outcome: Progressing     Problem: Neurological Deficit  Goal: Neurological status is stable or improving  Description: Interventions:  - Monitor and assess patient's level of consciousness, motor function, sensory function, and level of assistance needed for ADLs.   - Monitor and report changes from baseline. Collaborate with interdisciplinary team to initiate plan and implement interventions as ordered.   - Provide and maintain a safe environment.  - Consider seizure precautions.  - Consider fall precautions.  - Consider aspiration precautions.  - Consider bleeding precautions.  Outcome: Progressing     Problem: Activity Intolerance/Impaired Mobility  Goal: Mobility/activity is maintained at optimum level for patient  Description: Interventions:  - Assess and monitor patient  barriers to mobility and need for assistive/adaptive devices.  - Assess patient's emotional response to limitations.  - Collaborate with interdisciplinary team and initiate plans and interventions as ordered.  - Encourage independent activity per ability.  - Maintain proper body alignment.  - Perform active/passive rom as  tolerated/ordered.  - Plan activities to conserve energy.  - Turn patient as appropriate  Outcome: Progressing     Problem: Communication Impairment  Goal: Ability to express needs and understand communication  Description: Assess patient's communication skills and ability to understand information.  Patient will demonstrate use of effective communication techniques, alternative methods of communication and understanding even if not able to speak.     - Encourage communication and provide alternate methods of communication as needed.  - Collaborate with case management/ for discharge needs.  - Include patient/family/caregiver in decisions related to communication.  Outcome: Progressing     Problem: Nutrition  Goal: Nutrition/Hydration status is improving  Description: Monitor and assess patient's nutrition/hydration status for malnutrition (ex- brittle hair, bruises, dry skin, pale skin and conjunctiva, muscle wasting, smooth red tongue, and disorientation). Collaborate with interdisciplinary team and initiate plan and interventions as ordered.  Monitor patient's weight and dietary intake as ordered or per policy. Utilize nutrition screening tool and intervene per policy. Determine patient's food preferences and provide high-protein, high-caloric foods as appropriate.     - Assist patient with eating.  - Allow adequate time for meals.  - Encourage patient to take dietary supplement as ordered.  - Collaborate with clinical nutritionist.  - Include patient/family/caregiver in decisions related to nutrition.  Outcome: Progressing     Problem: COPING  Goal: Pt/Family able to verbalize concerns and demonstrate effective coping strategies  Description: INTERVENTIONS:  - Assist patient/family to identify coping skills, available support systems and cultural and spiritual values  - Provide emotional support, including active listening and acknowledgement of concerns of patient and caregivers  - Reduce  environmental stimuli, as able  - Provide patient education  - Assess for spiritual pain/suffering and initiate spiritual care, including notification of Pastoral Care or fani based community as needed  - Assess effectiveness of coping strategies  Outcome: Progressing  Goal: Will report anxiety at manageable levels  Description: INTERVENTIONS:  - Administer medication as ordered  - Teach and encourage coping skills  - Provide emotional support  - Assess patient/family for anxiety and ability to cope  Outcome: Progressing     Problem: DEATH & DYING  Goal: Pt/Family communicate acceptance of impending death and expresses psychological comfort and peace  Description: INTERVENTIONS:  - Assess patient/family anxiety and grief process related to end of life issues  - Provide emotional, spiritual and psychosocial support  - Provide information about the patient’s health status with consideration of family and cultural values  - Communicate willingness to discuss death and facilitate grief process  with patient/family as appropriate  - Emphasize sustaining relationships within family system and community, or fani/spiritual traditions  - Initiate Spiritual Care, Pastoral care or other ancillary consults as needed  - Refer to community support groups as appropriate  Outcome: Progressing     Problem: CHANGE IN BODY IMAGE  Goal: Pt/Family communicate acceptance of loss or change in body image and expresses psychological comfort and peace  Description: INTERVENTIONS:  - Assess patient/family anxiety and grief process related to change in body image, loss of functional status and loss of sense of self  - Assess patient/family's coping skills and provide emotional, spiritual and psychosocial support  - Provide information about the patient's health status with consideration of family and cultural values  - Communicate willingness to discuss loss and facilitate grief process with patient/family as appropriate  - Emphasize  sustaining relationships within family system and community, or fani/spiritual traditions  - Refer to community support groups as appropriate  - Initiate Spiritual Care, Pastoral care or other ancillary consults as needed  Outcome: Progressing     Problem: DECISION MAKING  Goal: Pt/Family able to effectively weigh alternatives and participate in decision making related to treatment and care  Description: INTERVENTIONS:  - Identify decision maker  - Determine when there are differences among patient's view, family's view, and healthcare provider's view of patient condition and care goals  - Facilitate patient/family articulation of goals for care  - Help patient/family identify pros/cons of alternative solutions  - Provide information as requested by patient/family  - Respect patient/family rights related to privacy and sharing information   - Serve as a liaison between patient, family and health care team  - Initiate consults as appropriate (Ethics Team, Palliative Care, Family Care Conference, etc.)  Outcome: Progressing     Problem: CONFUSION/THOUGHT DISTURBANCE  Goal: Thought disturbances (confusion, delirium, depression, dementia or psychosis) are managed to maintain or return to baseline mental status and functional level  Description: INTERVENTIONS:  - Assess for possible contributors to  thought disturbance, including but not limited to medications, infection, impaired vision or hearing, underlying metabolic abnormalities, dehydration, respiratory compromise,  psychiatric diagnoses and notify attending PHYSICAN/AP  - Monitor and intervene to maintain adequate nutrition, hydration, elimination, sleep and activity  - Decrease environmental stimuli, including noise as appropriate.  - Provide frequent contacts to provide refocusing, direction and reassurance as needed. Approach patient calmly with eye contact and at their level.  - [place] high risk fall precautions, aspiration precautions and other safety  measures, as indicated  - If delirium suspected, notify physician/AP of change in condition and request immediate in-person evaluation  - Pursue consults as appropriate including Geriatric (campus dependent), OT for cognitive evaluation/activity planning, psychiatric, pastoral care, etc.  Outcome: Progressing     Problem: BEHAVIOR  Goal: Pt/Family maintain appropriate behavior and adhere to behavioral management agreement, if implemented  Description: INTERVENTIONS:  - Assess the family dynamic   - Encourage verbalization of thoughts and concerns in a socially appropriate manner  - Assess patient/family's coping skills and non-compliant behavior (including use of illegal substances).  - Utilize positive, consistent limit setting strategies supporting safety of patient, staff and others  - Initiate consult with Case Management, Spiritual Care or other ancillary services as appropriate  - If a patient's/visitor's behavior jeopardizes the safety of the patient, staff, or others, refer to organization procedure.   - Notify Security of behavior or suspected illegal substances which indicate the need for search of the patient and/or belongings  - Encourage participation in the decision making process about a behavioral management agreement; implement if patient meets criteria  Outcome: Progressing     Problem: Depression - IP adult  Goal: Effects of depression will be minimized  Description: INTERVENTIONS:  - Assess impact of patient's symptoms on level of functioning, self-care needs and offer support as indicated  - Assess patient/family knowledge of depression, impact on illness and need for teaching  - Provide emotional support, presence and reassurance  - Assess for possible suicidal thoughts, ideation or self-harm. If patient expresses suicidal thoughts or statements do not leave alone, notify physician/AP immediately, initiate Suicide Precautions, and determine need for continual observation.  - Initiate consults  and referrals as appropriate (a mental health professional, Spiritual Care)  - Administer medication as ordered  Outcome: Progressing     Problem: SELF HARM  Goal: Effect of psychiatric condition will be minimized and patient will be protected from self harm  Description: INTERVENTIONS:  - Assess impact of patient's symptoms on level of functioning, self-care needs and offer support as indicated  - Assess patient/family knowledge of depression, impact on illness and need for teaching  - Provide emotional support, presence and reassurance  - Assess for possible suicidal thoughts, ideation or self-harm. If patient expresses suicidal thoughts or statements do not leave alone, notify physician/AP immediately, initiate suicide precautions, and determine need for continual observation.  - initiate consults and referrals as appropriate (a mental health professional, Spiritual Care  Outcome: Progressing     Problem: ABUSE/NEGLECT  Goal: Pt/Caregiver/Family aware of resources to assist with issues of abuse and neglect  Description: INTERVENTIONS:  - If child abuse and/or neglect is suspected, notify [employer] directly  - Assess for level of risk and safety  - Initiate referral to Case Management  - Notify PHYSICIAN/AP and Nursing Supervisor  - Provide appropriate education and resources to patient and/or family  - Initiate referral to age appropriate protective services, i.e. [facility] or Child Protective Services  - Offer patient/caregiver the option to Opt Out of patient information directory  - Provide emotional support, including active listening and acknowledgment of concerns  - Provide the patient with information about supportive services i.e. shelters, community resources for domestic violence  Outcome: Progressing     Problem: SUBSTANCE USE/ABUSE  Goal: Will have no detox symptoms and will verbalize plan for changing substance-related behavior  Description: INTERVENTIONS:  - Monitor physical status and  assess for symptoms of withdrawal  - Administer medication as ordered  - Provide emotional support with 1 on 1 interaction with staff  - Encourage recovery focused program/ addiction education  - Assess for verbalization of changing behaviors related to substance abuse  - Initiate consults and referrals as appropriate (Case Management, Spiritual Care, etc.)  Outcome: Progressing  Goal: By discharge, will develop insight into their chemical dependency and sustain motivation to continue in recovery  Description: INTERVENTIONS:  - Attends all daily group sessions and scheduled AA groups  - Actively practices coping skills through participation in the therapeutic community and adherence to program rules  - Reviews and completes assignments from individual treatment plan  - Assist patient development of understanding of their personal cycle of addiction and relapse triggers  Outcome: Progressing  Goal: By discharge, patient will have ongoing treatment plan addressing chemical dependency  Description: INTERVENTIONS:  - Assist patient with resources and/or appointments for ongoing recovery based living  Outcome: Progressing     Problem: SPIRITUAL CARE  Goal: Pt/Family able to move forward in process of forgiving self, others and/or higher power  Description: INTERVENTIONS:  - Assist patient with any spiritual needs/requests such as communion, confession, anointing, etc  - Explore guilt and help patient/family identify possible spiritual/cultural beliefs and values  - Explore possibilities of making amends & reconciliation with self, others, and/or a greater power  - Guide patient/family in identifying painful feelings  - Help patient explore and identify spiritual beliefs, cultural understandings or values that may help or hinder letting go of issue  - Help patient explore feelings of anger, bitterness, resentment, anxiety   Help patient/family identify and examine the situation in which these feelings are experienced  -  Help patient/family identify destructive displacement of feelings onto other individuals  - Refer patient to formal counseling and/or to fani community for further support as needed or per request  Outcome: Progressing  Goal: Patient feels balance and connection with others and/or higher power that empowers the self during times of loss, guilt and fear  Description: INTERVENTIONS:  - Create safety for patient through empathic presence and non-judgmental listening  - Encourage patient to explore his/her values, beliefs and/or spiritual images and practices  - Encourage use of breath work, imagery, meditation, relaxation, reiki to ease distress and provide healing  - Encourage use of cultural and spiritual celebrations and rituals  - Facilitate discussion that helps patient sort out spiritual concerns  - Help patient identify where meaning/hope/comfort & strength are in his/her life  - Refer patient to fani community for assistance, as appropriate  - Respond to patient/family need for prayer/ritual/sacrament/ceremony  Outcome: Progressing

## 2024-04-17 NOTE — CONSULTS
"[facility]  Consult: Critical Care  Name: Aleah Newton 53 y.o. female I MRN: [id number]  Unit/Bed#: ICU 02 I Date of Admission: 4/17/2024   Date of Service: 4/17/2024 I Hospital Day: 0      Inpatient consult to Medical Critical Care  Consult performed by: Gio Sawant DO  Consult ordered by: Jorge Salter MD        Assessment/Plan   Neuro / Psych:   Diagnosis: ACOM aneurysm  4/17 endovascular embolization with web device  Neurochecks per neurosurgery  Hold DAPT pending neurosurgical recommendations  SBP goal 100-140    CV:   No acute issues  Hemodynamically stable  Monitor vitals per unit routine    Pulm:  No acute issues  Oxygen supplement PRN, goal O2Sat > 92%    GI:   No acute issues    Renal/:  No acute issues  Monitor I&Os    F/E/N:   Fluid: maintain euvolemic  mIVF until tolerating p.o.  Electrolytes: replete PRN to maintain goal  Goal Na 135-145, Mg > 2, Phos > 3, K goal > 4  Daily BMP  Nutrition:  N.p.o. pending bedside dysphagia screening    Heme/Onc:   No acute issues    Endo:   Diagnosis: History of hypothyroidism  Continue home levothyroxine    ID:   No acute issues    MSK/Skin:   Diagnosis: pressure ulcer ppx  Frequent turning  Frequent pressure off-loading to prevent skin breakdown  PT/OT when able  Diagnosis: VTE ppx  Pharmacologic: Pharmacologic VTE Prophylaxis contraindicated due to recent neurosurgical procedure  Mechanical: SCD's      Disposition: Critical care     History of Present Illness     HPI: Aleah Newton is a 53 y.o. with history of hypothyroidism, known anterior communicating artery aneurysm who presents for elective cerebral angiography and web embolization of aneurysm.  Patient returns from the IR lab extubated and currently has no complaints.    History obtained from chart review and the patient.  Review of Systems   Constitutional:  Negative for chills and fever.   Respiratory:  Negative for shortness of breath.         \"Junk\" in " lungs   Cardiovascular:  Negative for chest pain.   Gastrointestinal:  Negative for abdominal pain, nausea and vomiting.   Musculoskeletal:  Positive for back pain.   Neurological:  Negative for dizziness, weakness, light-headedness, numbness and headaches.     Historical Information   Past Medical History:  No date: Abnormal Pap smear of cervix  No date: Anemia  No date: Arthritis  No date: Asthma  No date: Colon polyp  No date: History of vertigo  No date: Hyperlipemia      Comment:  triglycerides  No date: Hypertriglyceridemia  No date: Hypothyroidism  No date: Impaired fasting glucose  No date: Insomnia  12/22/2021: Lab test positive for detection of COVID-19 virus  No date: Premenstrual tension syndrome  No date: Tendonitis Past Surgical History:  2016: COLONOSCOPY  11/04/2023: COLONOSCOPY  1/8/2024: IR CEREBRAL ANGIOGRAPHY  11/03/2020: MAMMO (HISTORICAL); Bilateral  No date: WISDOM TOOTH EXTRACTION   Current Outpatient Medications   Medication Instructions    ALBUTEROL IN Inhalation, As needed    aspirin (ECOTRIN LOW STRENGTH) 81 mg, Oral, Daily    fluocinonide (LIDEX) 0.05 % cream Apply twice a day to the back as needed only when pink and itchy.    hydroquinone 4 % cream Topical, 2 times daily, To the face (dark area) only.    levothyroxine 50 mcg tablet TAKE 1 TABLET BY MOUTH EVERY DAY    loratadine-pseudoephedrine (CLARITIN-D 12-HOUR) 5-120 mg per tablet 1 tablet, Oral, Daily PRN    Multiple Vitamin (MULTI-VITAMIN DAILY PO) Oral, Daily    ticagrelor (BRILINTA) 90 mg, Oral, Every 12 hours scheduled    Zolpidem Tartrate (AMBIEN PO) Oral    Allergies   Allergen Reactions    Niacin Hives     In high doses   (Flushing)    Mucinex Fast-Max Chest Scott Ms [Guaifenesin] Palpitations      Social History     Tobacco Use    Smoking status: Never    Smokeless tobacco: Never   Vaping Use    Vaping status: Never Used   Substance Use Topics    Alcohol use: Yes     Comment: social     Drug use: Never    Family History    Problem Relation Age of Onset    Colon cancer Mother 72    Diabetes Mother     Hypertension Mother     Heart disease Father     Hyperlipidemia Father     Heart attack Father     Heart failure Father     Alzheimer's disease Maternal Aunt     No Known Problems Son     No Known Problems Sister     Breast cancer Maternal Grandmother         50's ?    No Known Problems Maternal Grandfather     Brain cancer Paternal Grandmother         young age    No Known Problems Paternal Grandfather     No Known Problems Paternal Uncle           Objective                            Vitals I/O      Most Recent Min/Max in 24hrs   Temp 97.5 °F (36.4 °C) Temp  Min: 97.5 °F (36.4 °C)  Max: 98.3 °F (36.8 °C)   Pulse 72 Pulse  Min: 72  Max: 85   Resp 16 Resp  Min: 16  Max: 22   /63 BP  Min: 141/63  Max: 166/72   O2 Sat 99 % SpO2  Min: 98 %  Max: 99 %      Intake/Output Summary (Last 24 hours) at 4/17/2024 1412  Last data filed at 4/17/2024 1305  Gross per 24 hour   Intake 1200 ml   Output 600 ml   Net 600 ml       Diet Regular; Regular House    Invasive Monitoring           Physical Exam   Physical Exam  Eyes:      General: Vision grossly intact.      Extraocular Movements: Extraocular movements intact.      Pupils: Pupils are equal, round, and reactive to light.   Skin:     General: Skin is warm and dry.      Comments: R femoral arteriotomy site without hematoma, edema, tenderness, warmth, redness   HENT:      Head: Normocephalic and atraumatic.      Mouth/Throat:      Mouth: Mucous membranes are moist.   Cardiovascular:      Rate and Rhythm: Normal rate and regular rhythm.      Pulses: Normal pulses.   Musculoskeletal:      Right lower leg: No edema.      Left lower leg: No edema.   Abdominal: General: There is no distension.      Palpations: Abdomen is soft.      Tenderness: There is no abdominal tenderness.   Constitutional:       General: She is not in acute distress.     Appearance: She is not ill-appearing.   Pulmonary:       Effort: Pulmonary effort is normal.      Breath sounds: Normal breath sounds.   Psychiatric:         Speech: Speech is not no expressive aphasia.   Neurological:      General: No focal deficit present.      Mental Status: She is alert and oriented to person, place and time.      Cranial Nerves: No dysarthria or facial asymmetry.      Sensory: No sensory deficit.      Motor: No motor deficit.      Comments: Right lower extremity motor strength not tested due to need for immobility but otherwise motor strength is intact throughout            Diagnostic Studies      Imaging:       Medications:  Scheduled PRN   chlorhexidine, 15 mL, BID  [START ON 4/18/2024] levothyroxine, 50 mcg, Early Morning            Continuous          Labs:    CBC    No recent results  BMP    No recent results    Coags    No recent results     Additional Electrolytes  No recent results       Blood Gas    No recent results  No recent results LFTs  No recent results    Infectious  No recent results  Glucose  No recent results             Gio Sawant, DO

## 2024-04-17 NOTE — BRIEF OP NOTE (RAD/CATH)
IR CEREBRAL ANGIOGRAPHY / INTERVENTION Procedure Note    PATIENT NAME: Aleah Newton  : 1970  MRN: [id number]    Pre-op Diagnosis:   1. Aneurysm of anterior communicating artery      Post-op Diagnosis:   1. Aneurysm of anterior communicating artery        Surgeon:   Jorge Salter MD  Assistants:     No qualified resident was available, Resident is only observing    Estimated Blood Loss: 100 cc  Findings:   Successful ACOM aneurysm WEB embolization.  Right femoral sheath removed.     Specimens: none    Complications:  none    Anesthesia: general    Jorge Salter MD     Date: 2024  Time: 12:14 PM

## 2024-04-17 NOTE — ANESTHESIA POSTPROCEDURE EVALUATION
Post-Op Assessment Note    CV Status:  Stable  Pain Score: 0    Pain management: adequate       Mental Status:  Alert and awake   Hydration Status:  Euvolemic   PONV Controlled:  Controlled   Airway Patency:  Patent  Airway: intubated     Post Op Vitals Reviewed: Yes    No anethesia notable event occurred.    Staff: CRNA               BP   139/61   Temp      Pulse 85   Resp 17   SpO2 98

## 2024-04-17 NOTE — ANESTHESIA PREPROCEDURE EVALUATION
Procedure:  IR CEREBRAL ANGIOGRAPHY / INTERVENTION    Relevant Problems   CARDIO   (+) Anterior communicating artery aneurysm      ENDO   (+) Acquired hypothyroidism      MUSCULOSKELETAL   (+) Patellofemoral arthritis of right knee      PULMONARY   (+) Moderate persistent asthma with acute exacerbation      Endocrine   (+) IFG (impaired fasting glucose)      Other   (+) Elevated blood pressure reading in office without diagnosis of hypertension        Physical Exam    Airway    Mallampati score: I  TM Distance: >3 FB  Neck ROM: full     Dental       Cardiovascular      Pulmonary      Other Findings  post-pubertal.    Anesthesia Plan  ASA Score- 2     Anesthesia Type- general with ASA Monitors.         Additional Monitors: arterial line.    Airway Plan: ETT.    Comment: Asthma under good control.       Plan Factors-    Chart reviewed. EKG reviewed.  Existing labs reviewed. Patient summary reviewed.    Patient is not a current smoker.  Patient did not smoke on day of surgery.            Induction- intravenous.    Postoperative Plan- . Planned trial extubation    Informed Consent- Anesthetic plan and risks discussed with patient.  I personally reviewed this patient with the CRNA. Discussed and agreed on the Anesthesia Plan with the CRNA..

## 2024-04-18 VITALS
HEIGHT: 66 IN | BODY MASS INDEX: 30.82 KG/M2 | DIASTOLIC BLOOD PRESSURE: 63 MMHG | WEIGHT: 191.8 LBS | OXYGEN SATURATION: 99 % | HEART RATE: 74 BPM | SYSTOLIC BLOOD PRESSURE: 141 MMHG | TEMPERATURE: 97.8 F | RESPIRATION RATE: 22 BRPM

## 2024-04-18 LAB
ANION GAP SERPL CALCULATED.3IONS-SCNC: 7 MMOL/L (ref 4–13)
BUN SERPL-MCNC: 12 MG/DL (ref 5–25)
CALCIUM SERPL-MCNC: 8.3 MG/DL (ref 8.4–10.2)
CHLORIDE SERPL-SCNC: 109 MMOL/L (ref 96–108)
CO2 SERPL-SCNC: 24 MMOL/L (ref 21–32)
CREAT SERPL-MCNC: 0.66 MG/DL (ref 0.6–1.3)
ERYTHROCYTE [DISTWIDTH] IN BLOOD BY AUTOMATED COUNT: 14.6 % (ref 11.6–15.1)
GFR SERPL CREATININE-BSD FRML MDRD: 100 ML/MIN/1.73SQ M
GLUCOSE SERPL-MCNC: 120 MG/DL (ref 65–140)
HCT VFR BLD AUTO: 34.8 % (ref 34.8–46.1)
HGB BLD-MCNC: 11 G/DL (ref 11.5–15.4)
MCH RBC QN AUTO: 27.2 PG (ref 26.8–34.3)
MCHC RBC AUTO-ENTMCNC: 31.6 G/DL (ref 31.4–37.4)
MCV RBC AUTO: 86 FL (ref 82–98)
PLATELET # BLD AUTO: 165 THOUSANDS/UL (ref 149–390)
PMV BLD AUTO: 11.4 FL (ref 8.9–12.7)
POTASSIUM SERPL-SCNC: 4 MMOL/L (ref 3.5–5.3)
RBC # BLD AUTO: 4.05 MILLION/UL (ref 3.81–5.12)
SODIUM SERPL-SCNC: 140 MMOL/L (ref 135–147)
WBC # BLD AUTO: 10.11 THOUSAND/UL (ref 4.31–10.16)

## 2024-04-18 PROCEDURE — 97162 PT EVAL MOD COMPLEX 30 MIN: CPT

## 2024-04-18 PROCEDURE — 99232 SBSQ HOSP IP/OBS MODERATE 35: CPT | Performed by: EMERGENCY MEDICINE

## 2024-04-18 PROCEDURE — 99238 HOSP IP/OBS DSCHRG MGMT 30/<: CPT | Performed by: PHYSICIAN ASSISTANT

## 2024-04-18 PROCEDURE — 85027 COMPLETE CBC AUTOMATED: CPT

## 2024-04-18 PROCEDURE — 80048 BASIC METABOLIC PNL TOTAL CA: CPT

## 2024-04-18 RX ADMIN — ASPIRIN 81 MG: 81 TABLET, COATED ORAL at 09:47

## 2024-04-18 RX ADMIN — TICAGRELOR 90 MG: 90 TABLET ORAL at 09:47

## 2024-04-18 RX ADMIN — LEVOTHYROXINE SODIUM 50 MCG: 50 TABLET ORAL at 06:01

## 2024-04-18 RX ADMIN — CHLORHEXIDINE GLUCONATE 0.12% ORAL RINSE 15 ML: 1.2 LIQUID ORAL at 09:47

## 2024-04-18 NOTE — PROGRESS NOTES
[facility]  Progress Note: Critical Care  Name: Aleah Newton 53 y.o. female I MRN: [id number]  Unit/Bed#: ICU 02 I Date of Admission: 4/17/2024   Date of Service: 4/18/2024 I Hospital Day: 1    Assessment/Plan   Neuro / Psych:   Diagnosis: ACOM aneurysm  4/17 endovascular embolization with web device  Neurochecks per neurosurgery  Continue DAPT  SBP goal 100-140     CV:   No acute issues  Hemodynamically stable  SBP goal as above  Monitor vitals per unit routine     Pulm:  No acute issues  Oxygen supplement PRN, goal O2Sat > 92%     GI:   No acute issues     Renal/:  No acute issues  Monitor I&Os     F/E/N:   Fluid: maintain euvolemic  Patient tolerating adequate p.o.  Electrolytes: replete PRN to maintain goal  Goal Na 135-145, Mg > 2, Phos > 3, K goal > 4  Daily BMP  Nutrition:  Regular diet     Heme/Onc:   No acute issues     Endo:   Diagnosis: History of hypothyroidism  Continue home levothyroxine     ID:   No acute issues     MSK/Skin:   Diagnosis: pressure ulcer ppx  Frequent turning  Frequent pressure off-loading to prevent skin breakdown  PT/OT when able  Diagnosis: VTE ppx  Pharmacologic: Pharmacologic VTE Prophylaxis contraindicated due to recent neurosurgical procedure  Mechanical: SCD's    Disposition: Med Surg    ICU Core Measures     A: Assess, Prevent, and Manage Pain Has pain been assessed? Yes  Need for changes to pain regimen? No   B: Both SAT/SAT  N/A   C: Choice of Sedation RASS Goal: 0 Alert and Calm  Need for changes to sedation or analgesia regimen? No   D: Delirium CAM-ICU: Negative   E: Early Mobility  Plan for early mobility? Yes   F: Family Engagement Plan for family engagement today? Yes       Review of Invasive Devices:        Audrey Plan: Keep arterial line for hemodynamic monitoring    Prophylaxis:  VTE Contraindicated secondary to: Recent neurosurgical procedure   Stress Ulcer  not ordered        Significant 24hr Events     24hr events:  "None     Subjective     Review of Systems   Constitutional:  Negative for chills and fever.   Respiratory:  Negative for shortness of breath.         \"Congested\" in chest   Cardiovascular:  Negative for chest pain.   Gastrointestinal:  Negative for abdominal pain, nausea and vomiting.   Musculoskeletal:  Negative for back pain.   Neurological:  Negative for dizziness, weakness, light-headedness, numbness and headaches.        Objective                            Vitals I/O      Most Recent Min/Max in 24hrs   Temp 98.2 °F (36.8 °C) Temp  Min: 97.5 °F (36.4 °C)  Max: 98.3 °F (36.8 °C)   Pulse 68 Pulse  Min: 64  Max: 85   Resp 20 Resp  Min: 13  Max: 29   /63 BP  Min: 141/63  Max: 166/72   O2 Sat 99 % SpO2  Min: 95 %  Max: 99 %      Intake/Output Summary (Last 24 hours) at 4/18/2024 0734  Last data filed at 4/18/2024 0601  Gross per 24 hour   Intake 3000 ml   Output 3925 ml   Net -925 ml       Diet Regular; Regular House    Invasive Monitoring           Physical Exam   Physical Exam  Eyes:      General: Vision grossly intact.      Extraocular Movements: Extraocular movements intact.      Pupils: Pupils are equal, round, and reactive to light.   Skin:     General: Skin is warm and dry.      Comments: R femoral arteriotomy site without hematoma, edema, tenderness, warmth, redness.  The gauze is mildly bloody but not saturated.   HENT:      Head: Normocephalic and atraumatic.      Mouth/Throat:      Mouth: Mucous membranes are moist.   Cardiovascular:      Rate and Rhythm: Normal rate and regular rhythm.      Pulses: Normal pulses.   Musculoskeletal:      Right lower leg: No edema.      Left lower leg: No edema.   Abdominal: General: There is no distension.      Palpations: Abdomen is soft.      Tenderness: There is no abdominal tenderness.   Constitutional:       General: She is not in acute distress.     Appearance: She is not ill-appearing.   Pulmonary:      Effort: Pulmonary effort is normal.      Breath sounds: " Normal breath sounds.   Psychiatric:         Speech: Speech is not no expressive aphasia.   Neurological:      General: No focal deficit present.      Mental Status: She is alert and oriented to person, place and time.      Cranial Nerves: No dysarthria or facial asymmetry.      Sensory: No sensory deficit.      Motor: No motor deficit.            Diagnostic Studies      Imaging:  I have personally reviewed pertinent reports.       Medications:  Scheduled PRN   aspirin, 81 mg, Daily  chlorhexidine, 15 mL, BID  levothyroxine, 50 mcg, Early Morning  ticagrelor, 90 mg, Q12H JOE          Continuous          Labs:    CBC    Recent Labs     04/18/24  0548   WBC 10.11   HGB 11.0*   HCT 34.8        BMP    Recent Labs     04/18/24  0548   SODIUM 140   K 4.0   *   CO2 24   AGAP 7   BUN 12   CREATININE 0.66   CALCIUM 8.3*       Coags    No recent results     Additional Electrolytes  No recent results       Blood Gas    No recent results  No recent results LFTs  No recent results    Infectious  No recent results  Glucose  Recent Labs     04/18/24  0548   GLUC 120               Gio Sawant, DO

## 2024-04-18 NOTE — DISCHARGE INSTR - AVS FIRST PAGE
The following instructions will help you care for yourself, or be cared for upon your return home today. These are guidelines for your care right after your surgery only.      Notify Your Doctor or Nurse if you have any of the following:    SYMPTOMS OF WOUND INFECTION--   Increased pain in or around the incision   Swelling around the incision  Any drainage from the incision  Incision separates or opens up  Warmth in the tissues around the incision  Redness or tenderness on the skin near the incision   Fever (temperature greater than 101 degrees F)     NEUROLOGICAL CHANGES--  Change in alertness  Increased sleepiness   Nausea and vomiting   New onset of numbness or weakness in arms or legs   New problems with your bowels or bladder  New or worse problems with balance or walking  Seizures, new or worsening    UNRELIEVED HEADACHE PAIN--  New or increased pain unrelieved with pain medications   Pain associated with nausea and vomiting   Pain associated with other symptoms    QUESTIONS OR PROBLEMS--  Any questions or problems that you are unsure about    Wound Care:    Keep Incision Clean and Dry   You may shower daily, but do not soak incision. Pat dry after showering.   No tub baths, soaking, swimming for 1 week after angiogram.   You do not need to cover the incision. Mild to moderate bruising and tenderness to the site is expected and may last up to 1-2 weeks after your procedure.     A closure device was placed at the catheter insertion site. This is MRI compatible. Remove the dressing 24 hours after your procedure.     If your groin site is bleeding, apply firm pressure for 10 minutes. Reinforce dressing rather than removing and checking frequently. If continues to bleed through the dressing after 1 hour, contact your neurosurgeon's office.     Anticipatory Education:    PAIN MED W/ Acetaminophen (Tylenol)  --IF a prescription for pain medicine has been sent home with you:  --Narcotic pain medication may cause  constipation. Be sure to take stool softeners or laxatives while you are on narcotic pain medication.   --Do not drive after taking prescription pain medicine.     If this medicine is too strong, or no longer necessary, or we did NOT recommend/prescribe oral narcotics, you may take:   - Tylenol Extra-strength/Acetaminophen, 2 tablets every 4-6 hours as needed for mild pain. DO NOT TAKE MORE THAN 4000MG PER DAY from combined sources. NOTE: Remember to eat when taking pain medicines in order to avoid nausea. Watch for constipation. Eat plenty of fruits, vegetables, juices, and drink 6-8 glasses of water each day.     Constipation: Stay active and drink at least 6-8 cups of fluid each day to prevent constipation. If you need a laxative or stool softener follow the package directions or consult with your local pharmacists if you have questions.    After anesthesia, rest for 24 hours. Do not drive, drink alcohol beverages or make any important decisions during this time. General anesthesia may cause sore throat, jaw discomfort or muscle aches. These symptoms can last for one or two days.    Activity:  Please follow these instructions:  Advance your activity as you can tolerate. You may do light house work; nothing strenuous   You may walk all you want. You may go up and down the steps. Use the railing for support  Do not do excessive bending, straining or heavy lifting for 48 hours after your procedure  Do not drive or return to work until you are instructed   It is normal for your energy level and sleep patterns to change after surgery.   Get extra sleep at night and take naps during the day to help you feel less tired.   Take rest periods during the day.   Complete recovery may take several weeks.    You may resume driving after 24-48 hours recovery.   You may return to work after 48 hours of recovery.     Diet:  Your doctor has recommended that you follow these diet instructions at home. Refer to the patient education  materials you received during your hospital stay. If you would like more nutrition counseling, ask your doctor about making an appointment with an outpatient dietitian.  Resume your home diet    Medications:  Please resume your home medications as instructed.       Home Supplies and Equipment:  none

## 2024-04-18 NOTE — ASSESSMENT & PLAN NOTE
PPD 1 WEB embolization of 5mm ACOM aneurysm    Plan:  Continue to monitor neurological exam  -140  Home medications reordered  Continue baby ASA daily, Brilinta 90mg BID   Pain control with currently regimen  NCC consulted for ICU management, appreciate recommendations  D/c sherry weldon IVF  PT/OT evaluation  DVT ppx: SCDs    Patient is stable for discharge home today. Please call with questions or concerns.

## 2024-04-18 NOTE — PHYSICAL THERAPY NOTE
Physical Therapy Evaluation     Patient's Name: Aleah Newton    Admitting Diagnosis  Aneurysm of anterior communicating artery [I67.1]    Problem List  Patient Active Problem List   Diagnosis    Mass of right axilla    Family history of breast cancer    Family history of colon cancer    Acquired hypothyroidism    Moderate persistent asthma with acute exacerbation    Chronic pain of both ankles    Flat foot    Patellofemoral arthritis of right knee    Lichen sclerosus    Elevated blood pressure reading in office without diagnosis of hypertension    Cough present for greater than 3 weeks    Anterior communicating artery aneurysm    Skin rash    IFG (impaired fasting glucose)    Adjustment insomnia    Stress at work       Past Medical History  Past Medical History:   Diagnosis Date    Abnormal Pap smear of cervix     Anemia     Arthritis     Asthma     Colon polyp     History of vertigo     Hyperlipemia     triglycerides    Hypertriglyceridemia     Hypothyroidism     Impaired fasting glucose     Insomnia     Lab test positive for detection of COVID-19 virus 12/22/2021    Premenstrual tension syndrome     Tendonitis        Past Surgical History  Past Surgical History:   Procedure Laterality Date    COLONOSCOPY  2016    COLONOSCOPY  11/04/2023    EMBOLIZATION ANEURYSM N/A 04/17/2024    IR CEREBRAL ANGIOGRAPHY  01/08/2024    MAMMO (HISTORICAL) Bilateral 11/03/2020    WISDOM TOOTH EXTRACTION            04/18/24 1107   PT Last Visit   PT Visit Date 04/18/24   Note Type   Note type Evaluation   Pain Assessment   Pain Assessment Tool 0-10   Pain Score No Pain   Restrictions/Precautions   Weight Bearing Precautions Per Order No   Home Living   Type of Home House   Home Layout Two level;Bed/bath upstairs;Access;Stairs to enter with rails  (3STE, FFSU avail if needed)   Bathroom Shower/Tub Tub/shower unit   Bathroom Toilet Raised   Bathroom Accessibility Accessible   Home Equipment   (no AD PTA)   Prior Function   Level of  [place] Independent with functional mobility;Independent with IADLS;Independent with ADLs   Lives With Spouse   Receives Help From Family   IADLs Independent with driving;Independent with medication management;Independent with meal prep   Falls in the last 6 months 0   Vocational Full time employment   General   Family/Caregiver Present Yes   Cognition   Overall Cognitive Status WFL   Arousal/Participation Alert   Orientation Level Oriented X4   Memory Within functional limits   Following Commands Follows all commands and directions without difficulty   Comments Patient pleasant and cooperative   Subjective   Subjective Patient agreeable to PT eval   RUE Assessment   RUE Assessment WFL   LUE Assessment   LUE Assessment WFL   RLE Assessment   RLE Assessment WFL   LLE Assessment   LLE Assessment WFL   Bed Mobility   Additional Comments OOB in chair on arrival   Transfers   Sit to Stand 6  Modified independent   Stand to Sit 6  Modified independent   Ambulation/Elevation   Gait pattern WNL   Gait Assistance 6  Modified independent   Assistive Device None   Distance 100'   Stair Management Assistance   (declines)   Balance   Static Sitting Normal   Dynamic Sitting Good   Static Standing Fair +   Dynamic Standing Fair +   Ambulatory Fair +   Endurance Deficit   Endurance Deficit No   Activity Tolerance   Activity Tolerance Patient tolerated treatment well   Nurse Made Aware RN cleared   Assessment   Prognosis Good   Assessment Pt is a 53 y.o. female seen for a moderate complexity PT evaluation due to Ongoing medical management for primary dx. Patient is s/p admit to [facility] on 4/17/2024 for Aneurysm of anterior communicating artery (I67.1). Patient  has a past medical history of Abnormal Pap smear of cervix, Anemia, Arthritis, Asthma, Colon polyp, History of vertigo, Hyperlipemia, Hypertriglyceridemia, Hypothyroidism, Impaired fasting glucose, Insomnia, Lab test positive for detection of COVID-19 virus,  Premenstrual tension syndrome, and Tendonitis..     PT now consulted to assess functional mobility and needs for safe d/c planning. pt independent with functional mobility, independent ADLs, and independent IADLs. Personal factors affecting status include steps to enter home, steps to negotiate within home, and medical status.     Currently pt requires modified independent for functional transfers with no AD ; modified independent for ambulation with no AD. Pt presents functioning at or near baseline level of function. Patient states no concerns with functional mobility at present. Patient is encouraged to continue ambulating with staff assist as able in order to maintain current LOF.     The patient's AM-PAC Basic Mobility Inpatient Short Form Raw Score Is 24. PT is currently recommending no post acute rehab needs on d/c from hospital. Due to patient current status, plan to sign off formal inpatient PT services at this time. Please re-consult should current status change.   Barriers to Discharge None   Goals   Patient Goals to go home   PT Treatment Day 0   Plan   Treatment/Interventions   (d/c PT)   PT Frequency   (d/c PT)   Discharge Recommendation   Rehab Resource Intensity Level, PT No post-acute rehabilitation needs   AM-PAC Basic Mobility Inpatient   Turning in Flat Bed Without Bedrails 4   Lying on Back to Sitting on Edge of Flat Bed Without Bedrails 4   Moving Bed to Chair 4   Standing Up From Chair Using Arms 4   Walk in Room 4   Climb 3-5 Stairs With Railing 4   Basic Mobility Inpatient Raw Score 24   Basic Mobility Standardized Score 57.68   [facility] Highest Level Of Mobility   -HLM Goal 8: Walk 250 feet or more   -HLM Achieved 7: Walk 25 feet or more   Modified [place] Scale   Modified [place] Scale 2   End of Consult   Patient Position at End of Consult All needs within reach;Bedside chair         Re Cadet, PT, DPT

## 2024-04-18 NOTE — DISCHARGE SUMMARY
[facility]  Discharge- Aleah Newton 1970, 53 y.o. female MRN: [id number]  Unit/Bed#: ICU 02 Encounter: [id number]  Primary Care Provider: Celsa Mayo MD   Date and time admitted to hospital: 4/17/2024  8:12 AM    * Anterior communicating artery aneurysm  Assessment & Plan  PPD 1 WEB embolization of 5mm ACOM aneurysm    Plan:  Continue to monitor neurological exam  -140  Home medications reordered  Continue baby ASA daily, Brilinta 90mg BID   Pain control with currently regimen  NCC consulted for ICU management, appreciate recommendations  D/c sherry weldon, BINA  PT/OT evaluation  DVT ppx: SCDs    Patient is stable for discharge home today. Please call with questions or concerns.             Medical Problems       Resolved Problems  Date Reviewed: 4/17/2024   None         Discharge Date: 4/18/24    Admitting Diagnosis: Aneurysm of anterior communicating artery [I67.1]    Discharge Diagnosis: same    Attending: Gaetano    Consulting Physician(s): KYLE    Procedures Performed: WEB embolization of 5mm ACOM aneurysm    Pathology: none    Hospital Course: Aleah Newton is a 54 y/o female who presented to the outpatient office for evaluation of abnormal MRA head, obtained during a work up for vertigo in February 2023  Imaging revealed 5mm ACOM aneurysm.  She is s/p diagnostic cerebral angiogram 1/8/24 which confirmed the findings. Patient underwent WEB embolization of ACOM aneurysm under general anesthesia with minimal blood loss and no complications.  Patient was kept in the PACU until stable and then moved to the ICU for close neurological monitoring. PT and OT were consulted and recommended home.  Patient was cleared medically for discharge on PPD 1.  Prior to discharge, postoperative instructions were discussed with patient.  During that time, all questions and concerns were addressed.  Patient will follow up outpatient in 2 weeks with a repeat MRA head w/wo.      Physical Exam:  General appearance: alert, appears stated age, cooperative and no distress  Head: Normocephalic, without obvious abnormality, atraumatic  Eyes: EOMI, PERRL, conjugate gaze  Neck: supple, symmetrical, trachea midline   Lungs: non labored breathing  Heart: regular heart rate  Neurologic:   Mental status: Alert, oriented x4, thought content appropriate, speech clear and fluent  Cranial nerves: grossly intact (Cranial nerves II-XII)  Sensory: normal to LT  Motor: moving all extremities without focal weakness   Coordination: finger to nose normal bilaterally, no drift bilaterally  R groin site with continued minimal bloody ooze. Pressure dressing changed.   2+ pedal pulses bilaterally    Condition at Discharge: good     Discharge instructions/Information to patient and family:   See after visit summary for information provided to patient and family.      Provisions for Follow-Up Care:  See after visit summary for information related to follow-up care and any pertinent home health orders.      Disposition: Home        Planned Readmission: No    Discharge Statement   I spent 25 minutes discharging the patient. This time was spent on the day of discharge. I had direct contact with the patient on the day of discharge. Additional documentation is required if more than 30 minutes were spent on discharge.     Discharge Medications:  See after visit summary for reconciled discharge medications provided to patient and family.

## 2024-04-18 NOTE — PLAN OF CARE
Problem: PAIN - ADULT  Goal: Verbalizes/displays adequate comfort level or baseline comfort level  Description: Interventions:  - Encourage patient to monitor pain and request assistance  - Assess pain using appropriate pain scale  - Administer analgesics based on type and severity of pain and evaluate response  - Implement non-pharmacological measures as appropriate and evaluate response  - Consider cultural and social influences on pain and pain management  - Notify physician/advanced practitioner if interventions unsuccessful or patient reports new pain  Outcome: Progressing     Problem: SAFETY ADULT  Goal: Patient will remain free of falls  Description: INTERVENTIONS:  - Educate patient/family on patient safety including physical limitations  - Instruct patient to call for assistance with activity   - Consult OT/PT to assist with strengthening/mobility   - Keep Call bell within reach  - Keep bed low and locked with side rails adjusted as appropriate  - Keep care items and personal belongings within reach  - Initiate and maintain comfort rounds  - Make Fall Risk Sign visible to staff  - Offer Toileting every 2 Hours, in advance of need  - Initiate/Maintain bed alarm  - Obtain necessary fall risk management equipment:   - Apply yellow socks and bracelet for high fall risk patients  - Consider moving patient to room near nurses station  Outcome: Progressing     Problem: Potential for Aspiration  Goal: Non-ventilated patient's risk of aspiration is minimized  Description: Assess and monitor vital signs, respiratory status, and labs (WBC).  Monitor for signs of aspiration (tachypnea, cough, rales, wheezing, cyanosis, fever).    - Assess and monitor patient's ability to swallow.  - Place patient up in chair to eat if possible.  - HOB up at 90 degrees to eat if unable to get patient up into chair.  - Supervise patient during oral intake.   - Instruct patient/ family to take small bites.  - Instruct patient/ family to  take small single sips when taking liquids.  - Follow patient-specific strategies generated by speech pathologist.  Outcome: Progressing

## 2024-04-18 NOTE — UTILIZATION REVIEW
Initial Clinical Review    Admission: Date/Time/Statement:   Admission Orders (From admission, onward)       Ordered        04/17/24 1218  Inpatient Admission  Once                          Orders Placed This Encounter   Procedures    Inpatient Admission     Standing Status:   Standing     Number of Occurrences:   1     Order Specific Question:   Level of Care     Answer:   Critical Care [15]     Order Specific Question:   Estimated length of stay     Answer:   Inpatient Only Surgery     ED Arrival Information       Patient not seen in ED                         Initial Presentation: 53 y.o. female, Direct admit for elective cerebral angiography and web embolization of aneurysm. Plan for IR today for procedure.    Admit to Inpatient Dx;  Plan is for cerebral angiogram with embolization today    4/17  Critical Care cons; Dx; ACOM aneurysm s/p WEB embolization. Pt returns from IR lab extubated.   Neuro checks. ASA/Brilinta due to not being a responder to Plavix. IVFs until tolerating po. Replete electrolytes prn. Daily BMP.  Supplemental O2 prn.    4/17 S/p IR Successful ACOM aneurysm WEB embolization.    Date: 4/18   Day 2:   Continue DAPT. Tolerating po, regular diet.     Vitals [04/17/24 0813]   Temperature Pulse Respirations Blood Pressure SpO2   98.3 °F (36.8 °C) 74 16 166/72 98 %      Temp Source Heart Rate Source Patient Position - Orthostatic VS BP Location FiO2 (%)   Oral Monitor -- Right arm --      Pain Score       No Pain          Wt Readings from Last 1 Encounters:   04/17/24 87 kg (191 lb 12.8 oz)     Additional Vital Signs:   4/18/24 0800 97.8 °F (36.6 °C) 72 27 Abnormal  -- -- 134/66 94 mmHg 99 % --   04/18/24 0600 -- 68 20 -- -- 132/62 86 mmHg 99 % --   04/18/24 0500 -- 72 15 -- -- 106/52 72 mmHg 97 % --     04/17/24 1600 97.5 °F (36.4 °C) 78 26 Abnormal  -- -- 132/66 90 mmHg 97 % --   04/17/24 1530 -- 74 16 -- -- 126/64 88 mmHg -- --   04/17/24 1500 -- 76 20 -- -- 132/66 92 mmHg -- --   04/17/24 1445  -- 74 19 -- -- 140/68 94 mmHg --      Pertinent Labs/Diagnostic Test Results:   MRA head w wo contrast (4/18) - Ordered         Results from last 7 days   Lab Units 04/18/24  0548   WBC Thousand/uL 10.11   HEMOGLOBIN g/dL 11.0*   HEMATOCRIT % 34.8   PLATELETS Thousands/uL 165         Results from last 7 days   Lab Units 04/18/24  0548   SODIUM mmol/L 140   POTASSIUM mmol/L 4.0   CHLORIDE mmol/L 109*   CO2 mmol/L 24   ANION GAP mmol/L 7   BUN mg/dL 12   CREATININE mg/dL 0.66   EGFR ml/min/1.73sq m 100   CALCIUM mg/dL 8.3*             Results from last 7 days   Lab Units 04/18/24  0548   GLUCOSE RANDOM mg/dL 120            Past Medical History:   Diagnosis Date    Abnormal Pap smear of cervix     Anemia     Arthritis     Asthma     Colon polyp     History of vertigo     Hyperlipemia     triglycerides    Hypertriglyceridemia     Hypothyroidism     Impaired fasting glucose     Insomnia     Lab test positive for detection of COVID-19 virus 12/22/2021    Premenstrual tension syndrome     Tendonitis      Present on Admission:   Anterior communicating artery aneurysm      Admitting Diagnosis: Aneurysm of anterior communicating artery [I67.1]  Age/Sex: 53 y.o. female    Admission Orders:  Scheduled Medications:  aspirin, 81 mg, Oral, Daily  chlorhexidine, 15 mL, Mouth/Throat, BID  levothyroxine, 50 mcg, Oral, Early Morning  ticagrelor, 90 mg, Oral, Q12H JOE      Continuous IV Infusions:   sodium chloride 0.9 % infusion  Rate: 75 mL/hr Dose: 75 mL/hr  Freq: Continuous Route: IV  Indications of Use: IV Hydration  Last Dose: Stopped (04/17/24 1305)  Start: 04/17/24 0815 End: 04/17/24 1127       PRN Meds: None       Neuro checks q1h  IP CONSULT TO CASE MANAGEMENT  IP CONSULT TO MEDICAL CRITICAL CARE    Network Utilization Review Department  ATTENTION: Please call with any questions or concerns to [phone] and carefully listen to the prompts so that you are directed to the right person. All voicemails are confidential.   For  Discharge needs, contact Care Management DC Support Team at [phone] opt. [phone]  Send all requests for admission clinical reviews, approved or denied determinations and any other requests to dedicated fax number below belonging to the campus where the patient is receiving treatment. List of dedicated fax numbers for the Facilities:  FACILITY NAME UR FAX NUMBER   ADMISSION DENIALS (Administrative/Medical Necessity) [phone]   DISCHARGE SUPPORT TEAM (NETWORK) [phone]   PARENT CHILD HEALTH (Maternity/NICU/Pediatrics) [phone]   [facility] [phone]   [facility] [phone]   [facility] [phone]   [facility] [phone]   Critical access hospital [phone]   [facility] [phone]   [facility] [phone]   [facility] [phone]   [facility] [phone]   [facility] [phone]   [facility] [phone]   [facility] [phone]

## 2024-04-18 NOTE — PLAN OF CARE
Problem: PAIN - ADULT  Goal: Verbalizes/displays adequate comfort level or baseline comfort level  Description: Interventions:  - Encourage patient to monitor pain and request assistance  - Assess pain using appropriate pain scale  - Administer analgesics based on type and severity of pain and evaluate response  - Implement non-pharmacological measures as appropriate and evaluate response  - Consider cultural and social influences on pain and pain management  - Notify physician/advanced practitioner if interventions unsuccessful or patient reports new pain  Outcome: Progressing     Problem: INFECTION - ADULT  Goal: Absence or prevention of progression during hospitalization  Description: INTERVENTIONS:  - Assess and monitor for signs and symptoms of infection  - Monitor lab/diagnostic results  - Monitor all insertion sites, i.e. indwelling lines, tubes, and drains  - Monitor endotracheal if appropriate and nasal secretions for changes in amount and color  - [place] appropriate cooling/warming therapies per order  - Administer medications as ordered  - Instruct and encourage patient and family to use good hand hygiene technique  - Identify and instruct in appropriate isolation precautions for identified infection/condition  Outcome: Progressing  Goal: Absence of fever/infection during neutropenic period  Description: INTERVENTIONS:  - Monitor WBC    Outcome: Progressing     Problem: SAFETY ADULT  Goal: Patient will remain free of falls  Description: INTERVENTIONS:  - Educate patient/family on patient safety including physical limitations  - Instruct patient to call for assistance with activity   - Consult OT/PT to assist with strengthening/mobility   - Keep Call bell within reach  - Keep bed low and locked with side rails adjusted as appropriate  - Keep care items and personal belongings within reach  - Initiate and maintain comfort rounds  - Make Fall Risk Sign visible to staff  - Offer Toileting every  Hours,  in advance of need  - Initiate/Maintain alarm  - Obtain necessary fall risk management equipment:   - Apply yellow socks and bracelet for high fall risk patients  - Consider moving patient to room near nurses station  Outcome: Progressing  Goal: Maintain or return to baseline ADL function  Description: INTERVENTIONS:  -  Assess patient's ability to carry out ADLs; assess patient's baseline for ADL function and identify physical deficits which impact ability to perform ADLs (bathing, care of mouth/teeth, toileting, grooming, dressing, etc.)  - Assess/evaluate cause of self-care deficits   - Assess range of motion  - Assess patient's mobility; develop plan if impaired  - Assess patient's need for assistive devices and provide as appropriate  - Encourage maximum independence but intervene and supervise when necessary  - Involve family in performance of ADLs  - Assess for home care needs following discharge   - Consider OT consult to assist with ADL evaluation and planning for discharge  - Provide patient education as appropriate  Outcome: Progressing  Goal: Maintains/Returns to pre admission functional level  Description: INTERVENTIONS:  - Perform AM-PAC 6 Click Basic Mobility/ Daily Activity assessment daily.  - Set and communicate daily mobility goal to care team and patient/family/caregiver.   - Collaborate with rehabilitation services on mobility goals if consulted  - Perform Range of Motion  times a day.  - Reposition patient every  hours.  - Dangle patient  times a day  - Stand patient  times a day  - Ambulate patient  times a day  - Out of bed to chair  times a day   - Out of bed for meals times a day  - Out of bed for toileting  - Record patient progress and toleration of activity level   Outcome: Progressing     Problem: DISCHARGE PLANNING  Goal: Discharge to home or other facility with appropriate resources  Description: INTERVENTIONS:  - Identify barriers to discharge w/patient and caregiver  - Arrange for  needed discharge resources and transportation as appropriate  - Identify discharge learning needs (meds, wound care, etc.)  - Arrange for interpretive services to assist at discharge as needed  - Refer to Case Management Department for coordinating discharge planning if the patient needs post-hospital services based on physician/advanced practitioner order or complex needs related to functional status, cognitive ability, or social support system  Outcome: Progressing     Problem: Knowledge Deficit  Goal: Patient/family/caregiver demonstrates understanding of disease process, treatment plan, medications, and discharge instructions  Description: Complete learning assessment and assess knowledge base.  Interventions:  - Provide teaching at level of understanding  - Provide teaching via preferred learning methods  Outcome: Progressing     Problem: Neurological Deficit  Goal: Neurological status is stable or improving  Description: Interventions:  - Monitor and assess patient's level of consciousness, motor function, sensory function, and level of assistance needed for ADLs.   - Monitor and report changes from baseline. Collaborate with interdisciplinary team to initiate plan and implement interventions as ordered.   - Provide and maintain a safe environment.  - Consider seizure precautions.  - Consider fall precautions.  - Consider aspiration precautions.  - Consider bleeding precautions.  Outcome: Progressing     Problem: Activity Intolerance/Impaired Mobility  Goal: Mobility/activity is maintained at optimum level for patient  Description: Interventions:  - Assess and monitor patient  barriers to mobility and need for assistive/adaptive devices.  - Assess patient's emotional response to limitations.  - Collaborate with interdisciplinary team and initiate plans and interventions as ordered.  - Encourage independent activity per ability.  - Maintain proper body alignment.  - Perform active/passive rom as  tolerated/ordered.  - Plan activities to conserve energy.  - Turn patient as appropriate  Outcome: Progressing     Problem: Communication Impairment  Goal: Ability to express needs and understand communication  Description: Assess patient's communication skills and ability to understand information.  Patient will demonstrate use of effective communication techniques, alternative methods of communication and understanding even if not able to speak.     - Encourage communication and provide alternate methods of communication as needed.  - Collaborate with case management/ for discharge needs.  - Include patient/family/caregiver in decisions related to communication.  Outcome: Progressing     Problem: Potential for Aspiration  Goal: Non-ventilated patient's risk of aspiration is minimized  Description: Assess and monitor vital signs, respiratory status, and labs (WBC).  Monitor for signs of aspiration (tachypnea, cough, rales, wheezing, cyanosis, fever).    - Assess and monitor patient's ability to swallow.  - Place patient up in chair to eat if possible.  - HOB up at 90 degrees to eat if unable to get patient up into chair.  - Supervise patient during oral intake.   - Instruct patient/ family to take small bites.  - Instruct patient/ family to take small single sips when taking liquids.  - Follow patient-specific strategies generated by speech pathologist.  Outcome: Progressing  Goal: Ventilated patient's risk of aspiration is minimized  Description: Assess and monitor vital signs, respiratory status, airway cuff pressure, and labs (WBC).  Monitor for signs of aspiration (tachypnea, cough, rales, wheezing, cyanosis, fever).    - Elevate head of bed 30 degrees if patient has tube feeding.  - Monitor tube feeding.  Outcome: Progressing     Problem: Nutrition  Goal: Nutrition/Hydration status is improving  Description: Monitor and assess patient's nutrition/hydration status for malnutrition (ex- brittle  hair, bruises, dry skin, pale skin and conjunctiva, muscle wasting, smooth red tongue, and disorientation). Collaborate with interdisciplinary team and initiate plan and interventions as ordered.  Monitor patient's weight and dietary intake as ordered or per policy. Utilize nutrition screening tool and intervene per policy. Determine patient's food preferences and provide high-protein, high-caloric foods as appropriate.     - Assist patient with eating.  - Allow adequate time for meals.  - Encourage patient to take dietary supplement as ordered.  - Collaborate with clinical nutritionist.  - Include patient/family/caregiver in decisions related to nutrition.  Outcome: Progressing     Problem: COPING  Goal: Pt/Family able to verbalize concerns and demonstrate effective coping strategies  Description: INTERVENTIONS:  - Assist patient/family to identify coping skills, available support systems and cultural and spiritual values  - Provide emotional support, including active listening and acknowledgement of concerns of patient and caregivers  - Reduce environmental stimuli, as able  - Provide patient education  - Assess for spiritual pain/suffering and initiate spiritual care, including notification of Pastoral Care or fani based community as needed  - Assess effectiveness of coping strategies  Outcome: Progressing  Goal: Will report anxiety at manageable levels  Description: INTERVENTIONS:  - Administer medication as ordered  - Teach and encourage coping skills  - Provide emotional support  - Assess patient/family for anxiety and ability to cope  Outcome: Progressing     Problem: DEATH & DYING  Goal: Pt/Family communicate acceptance of impending death and expresses psychological comfort and peace  Description: INTERVENTIONS:  - Assess patient/family anxiety and grief process related to end of life issues  - Provide emotional, spiritual and psychosocial support  - Provide information about the patient’s health status  with consideration of family and cultural values  - Communicate willingness to discuss death and facilitate grief process  with patient/family as appropriate  - Emphasize sustaining relationships within family system and community, or fani/spiritual traditions  - Initiate Spiritual Care, Pastoral care or other ancillary consults as needed  - Refer to community support groups as appropriate  Outcome: Progressing     Problem: CHANGE IN BODY IMAGE  Goal: Pt/Family communicate acceptance of loss or change in body image and expresses psychological comfort and peace  Description: INTERVENTIONS:  - Assess patient/family anxiety and grief process related to change in body image, loss of functional status and loss of sense of self  - Assess patient/family's coping skills and provide emotional, spiritual and psychosocial support  - Provide information about the patient's health status with consideration of family and cultural values  - Communicate willingness to discuss loss and facilitate grief process with patient/family as appropriate  - Emphasize sustaining relationships within family system and community, or fani/spiritual traditions  - Refer to community support groups as appropriate  - Initiate Spiritual Care, Pastoral care or other ancillary consults as needed  Outcome: Progressing     Problem: DECISION MAKING  Goal: Pt/Family able to effectively weigh alternatives and participate in decision making related to treatment and care  Description: INTERVENTIONS:  - Identify decision maker  - Determine when there are differences among patient's view, family's view, and healthcare provider's view of patient condition and care goals  - Facilitate patient/family articulation of goals for care  - Help patient/family identify pros/cons of alternative solutions  - Provide information as requested by patient/family  - Respect patient/family rights related to privacy and sharing information   - Serve as a liaison between patient,  family and health care team  - Initiate consults as appropriate (Ethics Team, Palliative Care, Family Care Conference, etc.)  Outcome: Progressing     Problem: CONFUSION/THOUGHT DISTURBANCE  Goal: Thought disturbances (confusion, delirium, depression, dementia or psychosis) are managed to maintain or return to baseline mental status and functional level  Description: INTERVENTIONS:  - Assess for possible contributors to  thought disturbance, including but not limited to medications, infection, impaired vision or hearing, underlying metabolic abnormalities, dehydration, respiratory compromise,  psychiatric diagnoses and notify attending PHYSICAN/AP  - Monitor and intervene to maintain adequate nutrition, hydration, elimination, sleep and activity  - Decrease environmental stimuli, including noise as appropriate.  - Provide frequent contacts to provide refocusing, direction and reassurance as needed. Approach patient calmly with eye contact and at their level.  - [place] high risk fall precautions, aspiration precautions and other safety measures, as indicated  - If delirium suspected, notify physician/AP of change in condition and request immediate in-person evaluation  - Pursue consults as appropriate including Geriatric (campus dependent), OT for cognitive evaluation/activity planning, psychiatric, pastoral care, etc.  Outcome: Progressing     Problem: BEHAVIOR  Goal: Pt/Family maintain appropriate behavior and adhere to behavioral management agreement, if implemented  Description: INTERVENTIONS:  - Assess the family dynamic   - Encourage verbalization of thoughts and concerns in a socially appropriate manner  - Assess patient/family's coping skills and non-compliant behavior (including use of illegal substances).  - Utilize positive, consistent limit setting strategies supporting safety of patient, staff and others  - Initiate consult with Case Management, Spiritual Care or other ancillary services as  appropriate  - If a patient's/visitor's behavior jeopardizes the safety of the patient, staff, or others, refer to organization procedure.   - Notify Security of behavior or suspected illegal substances which indicate the need for search of the patient and/or belongings  - Encourage participation in the decision making process about a behavioral management agreement; implement if patient meets criteria  Outcome: Progressing     Problem: Depression - IP adult  Goal: Effects of depression will be minimized  Description: INTERVENTIONS:  - Assess impact of patient's symptoms on level of functioning, self-care needs and offer support as indicated  - Assess patient/family knowledge of depression, impact on illness and need for teaching  - Provide emotional support, presence and reassurance  - Assess for possible suicidal thoughts, ideation or self-harm. If patient expresses suicidal thoughts or statements do not leave alone, notify physician/AP immediately, initiate Suicide Precautions, and determine need for continual observation.  - Initiate consults and referrals as appropriate (a mental health professional, Spiritual Care)  - Administer medication as ordered  Outcome: Progressing     Problem: SELF HARM  Goal: Effect of psychiatric condition will be minimized and patient will be protected from self harm  Description: INTERVENTIONS:  - Assess impact of patient's symptoms on level of functioning, self-care needs and offer support as indicated  - Assess patient/family knowledge of depression, impact on illness and need for teaching  - Provide emotional support, presence and reassurance  - Assess for possible suicidal thoughts, ideation or self-harm. If patient expresses suicidal thoughts or statements do not leave alone, notify physician/AP immediately, initiate suicide precautions, and determine need for continual observation.  - initiate consults and referrals as appropriate (a mental health professional, Spiritual  Care  Outcome: Progressing     Problem: ABUSE/NEGLECT  Goal: Pt/Caregiver/Family aware of resources to assist with issues of abuse and neglect  Description: INTERVENTIONS:  - If child abuse and/or neglect is suspected, notify Childline directly  - Assess for level of risk and safety  - Initiate referral to Case Management  - Notify PHYSICIAN/AP and Nursing Supervisor  - Provide appropriate education and resources to patient and/or family  - Initiate referral to age appropriate protective services, i.e. [facility] or Child Protective Services  - Offer patient/caregiver the option to Opt Out of patient information directory  - Provide emotional support, including active listening and acknowledgment of concerns  - Provide the patient with information about supportive services i.e. shelters, community resources for domestic violence  Outcome: Progressing     Problem: SUBSTANCE USE/ABUSE  Goal: Will have no detox symptoms and will verbalize plan for changing substance-related behavior  Description: INTERVENTIONS:  - Monitor physical status and assess for symptoms of withdrawal  - Administer medication as ordered  - Provide emotional support with 1 on 1 interaction with staff  - Encourage recovery focused program/ addiction education  - Assess for verbalization of changing behaviors related to substance abuse  - Initiate consults and referrals as appropriate (Case Management, Spiritual Care, etc.)  Outcome: Progressing  Goal: By discharge, will develop insight into their chemical dependency and sustain motivation to continue in recovery  Description: INTERVENTIONS:  - Attends all daily group sessions and scheduled AA groups  - Actively practices coping skills through participation in the therapeutic community and adherence to program rules  - Reviews and completes assignments from individual treatment plan  - Assist patient development of understanding of their personal cycle of addiction and relapse  triggers  Outcome: Progressing  Goal: By discharge, patient will have ongoing treatment plan addressing chemical dependency  Description: INTERVENTIONS:  - Assist patient with resources and/or appointments for ongoing recovery based living  Outcome: Progressing     Problem: SPIRITUAL CARE  Goal: Pt/Family able to move forward in process of forgiving self, others and/or higher power  Description: INTERVENTIONS:  - Assist patient with any spiritual needs/requests such as communion, confession, anointing, etc  - Explore guilt and help patient/family identify possible spiritual/cultural beliefs and values  - Explore possibilities of making amends & reconciliation with self, others, and/or a greater power  - Guide patient/family in identifying painful feelings  - Help patient explore and identify spiritual beliefs, cultural understandings or values that may help or hinder letting go of issue  - Help patient explore feelings of anger, bitterness, resentment, anxiety   Help patient/family identify and examine the situation in which these feelings are experienced  - Help patient/family identify destructive displacement of feelings onto other individuals  - Refer patient to formal counseling and/or to fani community for further support as needed or per request  Outcome: Progressing  Goal: Patient feels balance and connection with others and/or higher power that empowers the self during times of loss, guilt and fear  Description: INTERVENTIONS:  - Create safety for patient through empathic presence and non-judgmental listening  - Encourage patient to explore his/her values, beliefs and/or spiritual images and practices  - Encourage use of breath work, imagery, meditation, relaxation, reiki to ease distress and provide healing  - Encourage use of cultural and spiritual celebrations and rituals  - Facilitate discussion that helps patient sort out spiritual concerns  - Help patient identify where meaning/hope/comfort & strength  are in his/her life  - Refer patient to fani community for assistance, as appropriate  - Respond to patient/family need for prayer/ritual/sacrament/ceremony  Outcome: Progressing

## 2024-04-19 ENCOUNTER — TELEPHONE (OUTPATIENT)
Dept: NEUROSURGERY | Facility: CLINIC | Age: 54
End: 2024-04-19

## 2024-04-19 ENCOUNTER — TRANSITIONAL CARE MANAGEMENT (OUTPATIENT)
Dept: FAMILY MEDICINE CLINIC | Facility: CLINIC | Age: 54
End: 2024-04-19

## 2024-04-19 NOTE — UTILIZATION REVIEW
NOTIFICATION OF ADMISSION DISCHARGE   This is a Notification of Discharge from [facility]. Please be advised that this patient has been discharge from our facility. Below you will find the admission and discharge date and time including the patient’s disposition.   UTILIZATION REVIEW CONTACT:  Latasha Lyn  Utilization   Network Utilization Review Department  Phone: [phone] x carefully listen to the prompts. All voicemails are confidential.  Email: [email or web address]     ADMISSION INFORMATION  PRESENTATION DATE: 4/17/2024  8:12 AM  OBERVATION ADMISSION DATE:   INPATIENT ADMISSION DATE: 4/17/24 12:18 PM   DISCHARGE DATE: 4/18/2024 11:52 AM   DISPOSITION:Home/Self Care    Network Utilization Review Department  ATTENTION: Please call with any questions or concerns to [phone] and carefully listen to the prompts so that you are directed to the right person. All voicemails are confidential.   For Discharge needs, contact Care Management DC Support Team at [phone] opt. 2  Send all requests for admission clinical reviews, approved or denied determinations and any other requests to dedicated fax number below belonging to the campus where the patient is receiving treatment. List of dedicated fax numbers for the Facilities:  FACILITY NAME UR FAX NUMBER   ADMISSION DENIALS (Administrative/Medical Necessity) [phone]   DISCHARGE SUPPORT TEAM ([facility]) [phone]   PARENT CHILD HEALTH (Maternity/NICU/Pediatrics) [phone]   [facility] [phone]   [facility] [phone]   [facility] [phone]   [facility] [phone]   [facility] [phone]   [facility] [phone]   [facility] [phone]   [facility] [phone]    [facility] [phone]   Pending sale to [facility] [phone]   [facility] [phone]   [facility] [phone]

## 2024-04-19 NOTE — TELEPHONE ENCOUNTER
Completed post angio/intervention callback to Aleah Newton at primary contact number. Aleah reports constipation and has not moved her bowels yet today. She has attempted to take a stool softener without luck. Provided instructions for bowel regimen. Senna S BID and miralax daily, increase fluid and fiber intake, until she goes. Otherwise she denies any pain, swelling, drainage, fevers at the puncture site. Confirmed understanding of post procedure medications, continue on ASA81 daily and Brilinra 90mg BID until advised otherwise. Has a supply of medication. Reports no headaches at this time. Explained that she should contact the office if she experiences any pain, swelling, or drainage from the site, and report to the ER or call 911 if she experiences WHOL, visual disturbance, confusion/disorientation, slurred speech, or ambulatory dysfunction. Reminded of post procedure follow-up scheduled on 5/10/2024. Patient appreciative of call.

## 2024-04-19 NOTE — TELEPHONE ENCOUNTER
4/19/24 - PT DISCHARGED TO HOME. CALLED PT AND LEFT VM CONFIRMING APPT IN [place].  5/10/24 2 WK HFU W/MO

## 2024-04-22 ENCOUNTER — TELEPHONE (OUTPATIENT)
Dept: NEUROSURGERY | Facility: CLINIC | Age: 54
End: 2024-04-22

## 2024-04-22 NOTE — TELEPHONE ENCOUNTER
Received a call from patient requesting a call back regarding her bowels.    Returned call to patient who stated she has been struggling with her bowel regimen reporting issues of constipation and diarrhea. She stated she was struggling with constipation and was taking senna which lead to diarrhea on Friday. She has not been able to go again since. She also reports abdominal discomfort when trying to have a BM. Directed she take senna, colace and miralax for the next few days to help regulate her bowels. She then reported blood in her stools. She stated it is not sixto red in her stool and more so when she wipes. Informed due to being constipated the blood could be from hemorrhoids and not the brilinta especially if it is not sixto red blood when she goes to the bathroom.    Patient aware to continue to monitor and trial the bowel regimen to help with constipation. She will call the office by the end of the week should she have further concerns of bloody stool in regards to her taking brilinta.     She was appreciative.

## 2024-04-23 ENCOUNTER — TELEPHONE (OUTPATIENT)
Age: 54
End: 2024-04-23

## 2024-04-23 NOTE — TELEPHONE ENCOUNTER
Jessica, a nurse  from [facility] called to notify the patient was in hospital from 17-18th for a cerebral aneurysm non ruptured.  She is requesting the  discharge summary once received be faxed to [phone].  She can be reached at phone [phone] for any further questions and also mentioned the patient has care gaps for breast cancer screening   colorectal screening.  Please review.  PT will also need TCM

## 2024-04-25 NOTE — TELEPHONE ENCOUNTER
Patient does not wish to make a TCM or appt at this time.  She is doing fine.  Knows to call if she wants to schedule in future.

## 2024-05-02 ENCOUNTER — HOSPITAL ENCOUNTER (OUTPATIENT)
Dept: MRI IMAGING | Facility: HOSPITAL | Age: 54
Discharge: HOME | End: 2024-05-02
Payer: COMMERCIAL

## 2024-05-02 DIAGNOSIS — I67.1 ANEURYSM OF ANTERIOR COMMUNICATING ARTERY: ICD-10-CM

## 2024-05-02 PROCEDURE — 70546 MR ANGIOGRAPH HEAD W/O&W/DYE: CPT

## 2024-05-02 PROCEDURE — A9585 GADOBUTROL INJECTION: HCPCS | Performed by: PHYSICIAN ASSISTANT

## 2024-05-02 RX ORDER — GADOBUTROL 604.72 MG/ML
8 INJECTION INTRAVENOUS
Status: COMPLETED | OUTPATIENT
Start: 2024-05-02 | End: 2024-05-02

## 2024-05-02 RX ADMIN — GADOBUTROL 8 ML: 604.72 INJECTION INTRAVENOUS at 13:41

## 2024-05-10 PROBLEM — I61.9 INTRAPARENCHYMAL HEMORRHAGE OF BRAIN (HCC): Status: ACTIVE | Noted: 2024-01-01

## 2024-05-10 NOTE — RESPIRATORY THERAPY NOTE
RT Ventilator Management Note  Aleah Newton 53 y.o. female MRN: [id number]  Unit/Bed#: ED-27 Encounter: [id number]       05/10/24 0730   Respiratory Assessment   Resp Comments Called to ED to intubated pt. Pt intubated with a 7.5 tube, 23 at the teeth. Tube secured with commerical tube mcknight. Tube placement verified by positive color change and bilateral breath sounds. Pt placed on vent on documented settings.   O2 Device vent   Vent Information   Vent type     Vent Mode AC/VC   $ Vent Charge-INITIAL Yes   Ventilator Start Yes   $ Pulse Oximetry Spot Check Charge Completed   SpO2 98 %   AC/VC Settings   Resp Rate (BPM) 16 BPM   Vt (mL) 400 mL   FIO2 (%) 60 %   PEEP (cmH2O) 6 cmH2O   Flow Pattern (LPM) 55 L/min   Trigger Sensitivity Flow (lpm) 3 %   Heater Temperature (Set) 98.6 °F (37 °C)   AC/VC Actuals   Resp Rate (BPM) 16 BPM   VT (mL) 385   MV 6.27   MAP (cmH2O) 8.8 cmH2O   Peak Pressure (cmH2O) 17 cmH2O   I/E Ratio (Obs) 1:3.7   Heater Temperature (Obs) 98.6 °F (37 °C)   AC/VC Alarms   High Peak Pressure (cmH2O) 40   High Resp Rate (BPM) 40 BPM   High MV (L/min) 18 L/min   Low MV (L/min) 3 L/min   Vt High (mL) 800 mL   Vt Low (mL) 250 mL   AC/VC Apnea Settings   Resp Rate (BPM) 16 BPM   VT (mL) 400 mL   FIO2 (%) 100 %   Apnea Time (s) 20 S   Apnea Flow (L/min) 55 L/min   Maintenance   Resuscitation bag with peep valve at bedside Yes   Water bag changed Yes   Circuit changed Yes

## 2024-05-10 NOTE — ASSESSMENT & PLAN NOTE
Assessment:  Patient is a 53-year-old female with past medical history of ACOM aneurysm, hypertension, moderate persistent asthma, hypothyroidism, family history of breast and colon cancer, presented to [facility] as a prehospital stroke alert on 5/10/2024.  Per ED, patient's last known well was 5/9/2024 at 2230.  Patient reportedly had headache, left-sided weakness, and had systolic blood pressure more than 200 per EMS, struggled with physical.  Patient on aspirin and Brilinta at baseline.  Presenting with pressure 160/90, pulse 57, fingerstick glucose 167, NIH 27.  Noncontrast CT head demonstrated large heterogenous intraparenchymal hemorrhage noted within the right parietal temporal region with volume estimated to be 89.3 cc.  ICH score 3.  Patient was intubated shortly after imaging studies were completed.    Neurocritical care was contacted, patient is excepted to neurocritical care service at [facility], PACs was contacted, with subsequent ETT 21 order being placed.  Handoff was given to ED attending to provide DDAVP, maintain systolic blood pressure between 100, and less than 140, obtain CT head noncontrast in 4 hours if patient has not been transferred at that time, and to involve neurosurgery.    Plan:  -Case discussed with attending neurologist Dr. Ogden  -Handoff given to [facility] neurocritical care attending, and [facility] ED attending  -Recommend transfer to [facility] neurocritical care as priority transfer  -Recommend SBP between 100, less than 140  -Recommend DDAVP  -Recommend noncontrast CT head in 4 hours if patient not transferred to [facility] at that time  -Recommend neurosurgery  -Recommend ICH protocol  -Recommend stroke pathway  -Recommend obtaining new studies: Hemoglobin A1c, TSH with free T4, lipid panel, urinalysis, UDS, vitamin B12, vitamin D, INR, PTT  -No AC/AP at this time  -Rest per primary team appreciated

## 2024-05-10 NOTE — PROCEDURES
POC Ocular US    Date/Time: 5/10/2024 2:00 PM    Performed by: Jarad Engel DO  Authorized by: Jarad Engel DO    Patient location:  ICU  Performed by:  Resident  Other Assisting Provider: Yes (comment) (Axelband)    Procedure details:     Exam Type:  Diagnostic    Indications: evaluation for increased intracranial pressure      Assessment for: optic nerve sheath diameter      Eye(s) assessed:  Both eyes    Structures visualized: optic nerve      Image quality: diagnostic      Image availability:  Images available in PACS  Left eye findings:     Left optic nerve sheath diameter: enlarged (greater than 5 mm)      Left optic nerve sheath diameter (mm):  78  Right eye findings:     Right optic nerve sheath diameter: enlarged (greater than 5 mm)      Right optic nerve sheath diameter (mm):  69  Interpretation:     Ocular US impressions: abnormal (see above)

## 2024-05-10 NOTE — ASSESSMENT & PLAN NOTE
S/p ACOM aneurysm web embolization with Dr. Salter 4/17/2024  Procedure went well without complications  Discharge next day 4/18/2024 on DAPT- ASA 81mg QD, brilinta 90mg BID

## 2024-05-10 NOTE — SPEECH THERAPY NOTE
Dysphagia consult received. Patient is s/p OR. She is intubated and sedated. Will discontinue orders at this time.

## 2024-05-10 NOTE — EMTALA/ACUTE CARE TRANSFER
[facility] REI EMERGENCY DEPARTMENT  [address] [facility]  [place] [id number]  Dept: [phone]      EMTALA TRANSFER CONSENT    NAME Aleah Newton                                         1970                              MRN [id number]    I have been informed of my rights regarding examination, treatment, and transfer   by Dr. Krystian Madera DO    Benefits: Specialized equipment and/or services available at the receiving facility (Include comment)________________________ (Neuro ICU, neurosurgery)    Risks: Potential for delay in receiving treatment, Potential deterioration of medical condition, Loss of IV, Increased discomfort during transfer, Possible worsening of condition or death during transfer      Consent for Transfer:  I acknowledge that my medical condition has been evaluated and explained to me by the emergency department physician or other qualified medical person and/or my attending physician, who has recommended that I be transferred to the service of  Accepting Physician: Dr. Turner at Accepting Facility Name, City & State : [facility] [place], [place]. The above potential benefits of such transfer, the potential risks associated with such transfer, and the probable risks of not being transferred have been explained to me, and I fully understand them.  The doctor has explained that, in my case, the benefits of transfer outweigh the risks.  I agree to be transferred.    I authorize the performance of emergency medical procedures and treatments upon me in both transit and upon arrival at the receiving facility.  Additionally, I authorize the release of any and all medical records to the receiving facility and request they be transported with me, if possible.  I understand that the safest mode of transportation during a medical emergency is an ambulance and that the Hospital advocates the use of this mode of transport. Risks of traveling to the receiving facility  by car, including absence of medical control, life sustaining equipment, such as oxygen, and medical personnel has been explained to me and I fully understand them.    (NEREIDA CORRECT BOX BELOW)  [  ]  I consent to the stated transfer and to be transported by ambulance/helicopter.  [  ]  I consent to the stated transfer, but refuse transportation by ambulance and accept full responsibility for my transportation by car.  I understand the risks of non-ambulance transfers and I exonerate the Hospital and its staff from any deterioration in my condition that results from this refusal.    X___________________________________________    DATE  05/10/24  TIME________  Signature of patient or legally responsible individual signing on patient behalf           RELATIONSHIP TO PATIENT_________________________          Provider Certification    NAME Aleah Newton                                         1970                              MRN [id number]    A medical screening exam was performed on the above named patient.  Based on the examination:    Condition Necessitating Transfer The primary encounter diagnosis was Intracerebral hemorrhage. A diagnosis of Intracerebral hemorrhage (HCC) was also pertinent to this visit.    Patient Condition: The patient has been stabilized such that within reasonable medical probability, no material deterioration of the patient condition or the condition of the unborn child(keara) is likely to result from the transfer    Reason for Transfer: Level of Care needed not available at this facility    Transfer Requirements: Facility [facility] [place]   Space available and qualified personnel available for treatment as acknowledged by    Agreed to accept transfer and to provide appropriate medical treatment as acknowledged by       Dr. Turner  Appropriate medical records of the examination and treatment of the patient are provided at the time of transfer   STAFF INITIAL WHEN  COMPLETED _______  Transfer will be performed by qualified personnel from    and appropriate transfer equipment as required, including the use of necessary and appropriate life support measures.    Provider Certification: I have examined the patient and explained the following risks and benefits of being transferred/refusing transfer to the patient/family:  General risk, such as traffic hazards, adverse weather conditions, rough terrain or turbulence, possible failure of equipment (including vehicle or aircraft), or consequences of actions of persons outside the control of the transport personnel, Unanticipated needs of medical equipment and personnel during transport, Risk of worsening condition, The possibility of a transport vehicle being unavailable      Based on these reasonable risks and benefits to the patient and/or the unborn child(keara), and based upon the information available at the time of the patient’s examination, I certify that the medical benefits reasonably to be expected from the provision of appropriate medical treatments at another medical facility outweigh the increasing risks, if any, to the individual’s medical condition, and in the case of labor to the unborn child, from effecting the transfer.    X____________________________________________ DATE 05/10/24        TIME_______      ORIGINAL - SEND TO MEDICAL RECORDS   COPY - SEND WITH PATIENT DURING TRANSFER

## 2024-05-10 NOTE — ASSESSMENT & PLAN NOTE
R parietal ICH   S/p S/p ACOM aneurysm web embolization with Dr. Salter 4/17/2024  Awoke 5/10/2024 with severe headache, L sided weakness   progression to unresponsiveness with decorticate posturing on arrival to ED     Intracerebral Hemorrhage (ICH) Score:    [place] Coma Sore 3-4 equals +2   Age greater than or equal to 80 No   ICH Volume greater than or equal to 30 ml Yes (1 Point)   Intraventricular Hemorrhage No   Infratentorial Origin of Hemorrhage No   Total: 3       Imaging:   CT head 5/10/2024: acute large heterogeneous parenchymal hemorrhage in the R parietal region. Associated edema with 1.2cm midline shift and effacement on the suprasellar cistern.  CTA 5/10/2024: negative for any acute vascular etiology assocated with R parietal parenchymal hemorrhage. Stable appearance of acom web embolization.     Plan:   Patient transferred emergently to hospitals  STAT CT head on arrival showed progression compared to present CT head  Patient taken emergently to OR with neurosurgery for decompressive R hemicraniectomy  Plan for ICU post operatively  NCC following   Will need to continue platelet transfusions for 24 hours given hemorrhage with previous DAPT use   Neurosurgery will continue to follow

## 2024-05-10 NOTE — CONSULTS
Consultation - Stroke   Aleah Newton 53 y.o. female MRN: [id number]  Unit/Bed#: ED-27 Encounter: [id number]    Recommendations for outpatient neurological follow up have yet to be determined.    Assessment/Plan   * Intraparenchymal hemorrhage of brain (HCC)  Assessment & Plan  Assessment:  Patient is a 53-year-old female with past medical history of ACOM aneurysm, hypertension, moderate persistent asthma, hypothyroidism, family history of breast and colon cancer, presented to [facility] as a prehospital stroke alert on 5/10/2024.  Per ED, patient's last known well was 5/9/2024 at 2230.  Patient reportedly had headache, left-sided weakness, and had systolic blood pressure more than 200 per EMS, struggled with physical.  Patient on aspirin and Brilinta at baseline.  Presenting with pressure 160/90, pulse 57, fingerstick glucose 167, NIH 27.  Noncontrast CT head demonstrated large heterogenous intraparenchymal hemorrhage noted within the right parietal temporal region with volume estimated to be 89.3 cc.  ICH score 3.  Patient was intubated shortly after imaging studies were completed.    Neurocritical care was contacted, patient is excepted to neurocritical care service at [facility], PACs was contacted, with subsequent ETT 21 order being placed.  Handoff was given to ED attending to provide DDAVP, maintain systolic blood pressure between 100, and less than 140, obtain CT head noncontrast in 4 hours if patient has not been transferred at that time, and to involve neurosurgery.    Plan:  -Case discussed with attending neurologist Dr. Ogden  -Handoff given to [facility] neurocritical care attending, and [facility] ED attending  -Recommend transfer to [facility] neurocritical care as priority transfer  -Recommend SBP between 100, less than 140  -Recommend DDAVP  -Recommend noncontrast CT head in 4 hours if patient not transferred to [facility] at that time  -Recommend neurosurgery  -Recommend ICH protocol  -Recommend  stroke pathway  -Recommend obtaining new studies: Hemoglobin A1c, TSH with free T4, lipid panel, urinalysis, UDS, vitamin B12, vitamin D, INR, PTT  -No AC/AP at this time  -Rest per primary team appreciated        History of Present Illness   Reason for Consult / Principal Problem: Prehospital stroke alert  Hx and PE limited by: Patient unresponsive, does not follow commands  Patient last known well: 5/9/2024, 2030  Stroke alert called: 5/10/2024, 0702  Neurology time of arrival: 5/10/2024, 0705  Time NIHSS was completed: 5/10/2024, 0730  Thrombolytic Decision: Patient not a candidate. Bleeding risk. and recent ACOM aneurysm    Modified Sunflower Score: 1 (No significant disability. Able to carry out all usual activities, despite some symptoms)    NIHSS:  1a.Level of Consciousness: 3 = Coma   1b. LOC Questions: 2 = Answers neither correctly   1c. LOC Commands: 2 = Obeys neither correctly   2. Best Gaze: 2 = Forced Deviation   3. Visual: 3 = Bilateral hemianopia   4. Facial Palsy: 0=Normal symmetric movement   5a. Motor Right Arm: 3=No effort against gravity, limb falls   5b. Motor Left Arm: 3=No effort against gravity, limb falls   6a. Motor Right Leg: 3=No effort against gravity, limb falls   6b. Motor Left Leg: 3=No effort against gravity, limb falls   7. Limb Ataxia:  0=Absent   8. Sensory: 0=Normal; no sensory loss   9. Best Language:  3=Mute, global aphasia; no usable speech or auditory comprehension   10. Dysarthria: UN = Intubated or other physical barrier   11. Extinction and Inattention (formerly Neglect): 0=No abnormality   Total Score: 27     HPI: Aleah Newton is a 53 y.o.  female with past medical history of anterior communicating artery aneurysm, hypertension, moderate persistent asthma, hypothyroidism, family history of breast and colon cancer the presented as a prehospital stroke alert to [facility] on 5/10/2024.  Per report from ED, patient's last known well was 5/9/2024 at 2230.   The patient reportedly had a headache, left-sided weakness, and had a systolic blood pressure more than 200.  Patient had ACOM aneurysm in April 2024.  Patient on aspirin and Brilinta at baseline.  Presenting blood pressure 160/90, pulse 57, SpO2 97% room air.  Fingerstick glucose 167.  NIH 27 as above.    Noncontrast CT head: A new large heterogenous intraparenchymal hemorrhage is noted within the right parietal temporal region compared to the recent MRI study of 5/2/2024 measuring 7.2 x 4.0 x 6.2 cm with volume estimated at 89.3 cc.  There is associated vasogenic edema with 1.2 cm left midline shift and partial effacement of the suprasellar cistern.    Neurocritical care was contacted, patient was accepted to neurocritical service at [facility], PACs were contacted, ADT21 order was placed, handoff was provided to ED attending to provide DDAVP, maintain systolic blood pressure >100 - <140, and to obtain a CT head noncontrast in 4 hours if patient is not transferred by that time, and to involve neurosurgery.    ICH score: 3  - Leda coma score: 6 per EMS: +1  - Age more than 80: No: +0  - ICH volume more than 30 mL: Yes: +1  - Intraventricular hemorrhage: Yes: +1  - Infratentorial origin of hemorrhage: No: +0    Inpatient consult to Neurology  Consult performed by: Nadeem Jenkins DO  Consult ordered by: Krystian Madera DO        Review of Systems   Unable to perform ROS: Acuity of condition     Historical Information   Past Medical History:   Diagnosis Date    Abnormal Pap smear of cervix     Anemia     Arthritis     Asthma     Colon polyp     History of vertigo     Hyperlipemia     triglycerides    Hypertriglyceridemia     Hypothyroidism     Impaired fasting glucose     Insomnia     Lab test positive for detection of COVID-19 virus 12/22/2021    Premenstrual tension syndrome     Tendonitis      Past Surgical History:   Procedure Laterality Date    COLONOSCOPY  2016    COLONOSCOPY  11/04/2023     EMBOLIZATION ANEURYSM N/A 04/17/2024    IR CEREBRAL ANGIOGRAPHY  01/08/2024    IR CEREBRAL ANGIOGRAPHY / INTERVENTION  4/17/2024    MAMMO (HISTORICAL) Bilateral 11/03/2020    WISDOM TOOTH EXTRACTION       Social History   Social History     Substance and Sexual Activity   Alcohol Use Yes    Comment: social      Social History     Substance and Sexual Activity   Drug Use Never     E-Cigarette/Vaping    E-Cigarette Use Never User      E-Cigarette/Vaping Substances    Nicotine No     THC No     CBD No     Flavoring No     Other No     Unknown No      Social History     Tobacco Use   Smoking Status Never   Smokeless Tobacco Never     Family History:   Family History   Problem Relation Age of Onset    Colon cancer Mother 72    Diabetes Mother     Hypertension Mother     Heart disease Father     Hyperlipidemia Father     Heart attack Father     Heart failure Father     Alzheimer's disease Maternal Aunt     No Known Problems Son     No Known Problems Sister     Breast cancer Maternal Grandmother         50's ?    No Known Problems Maternal Grandfather     Brain cancer Paternal Grandmother         young age    No Known Problems Paternal Grandfather     No Known Problems Paternal Uncle      Review of previous medical records was completed.    Meds/Allergies   PTA meds:   Prior to Admission Medications   Prescriptions Last Dose Informant Patient Reported? Taking?   ALBUTEROL IN  Self Yes No   Sig: Inhale if needed   Multiple Vitamin (MULTI-VITAMIN DAILY PO)   Yes No   Sig: Take by mouth in the morning   Zolpidem Tartrate (AMBIEN PO)   Yes No   Sig: Take by mouth   aspirin (ECOTRIN LOW STRENGTH) 81 mg EC tablet   No No   Sig: Take 1 tablet (81 mg total) by mouth daily   fluocinonide (LIDEX) 0.05 % cream   No No   Sig: Apply twice a day to the back as needed only when pink and itchy.   hydroquinone 4 % cream   No No   Sig: Apply topically 2 (two) times a day To the face (dark area) only.   levothyroxine 50 mcg tablet   No No  "  Sig: TAKE 1 TABLET BY MOUTH EVERY DAY   loratadine-pseudoephedrine (CLARITIN-D 12-HOUR) 5-120 mg per tablet   No No   Sig: Take 1 tablet by mouth daily as needed for allergies   ticagrelor (BRILINTA) 90 MG   No No   Sig: Take 1 tablet (90 mg total) by mouth every 12 (twelve) hours      Facility-Administered Medications: None     Allergies   Allergen Reactions    Niacin Hives     In high doses   (Flushing)    Mucinex Fast-Max Chest Scott Ms [Guaifenesin] Palpitations     Objective   Vitals:Blood pressure 150/76, pulse 55, temperature (!) 94.6 °F (34.8 °C), temperature source Rectal, resp. rate 16, height 5' 6\" (1.676 m), weight 87.5 kg (192 lb 14.4 oz), last menstrual period 09/12/2022, SpO2 96%.,Body mass index is 31.14 kg/m².  No intake or output data in the 24 hours ending 05/10/24 0940    Invasive Devices:   Invasive Devices       Peripheral Intravenous Line  Duration             Peripheral IV 05/10/24 <1 day    Peripheral IV 05/10/24 Dorsal (posterior);Right Wrist <1 day    Peripheral IV 05/10/24 Left;Proximal;Ventral (anterior) Forearm <1 day              Drain  Duration             NG/OG/Enteral Tube Nasogastric 16 Fr Right nare <1 day    Urethral Catheter <1 day              Airway  Duration             ETT  Oral 7.5 mm <1 day                  Physical Exam  Vitals reviewed.   Cardiovascular:      Rate and Rhythm: Bradycardia present.   Pulmonary:      Effort: Respiratory distress present.   Skin:     General: Skin is warm and dry.       Neurologic Exam     Mental Status   Patient was unresponsive to voice, demonstrated minimal withdrawal to noxious stimuli in all 4 extremities, patient nonverbal.     Cranial Nerves   Patient did not demonstrate blink to threat in bilateral eyes, right pupil dilated at 6 mm, left pupil at 3 mm, bilaterally minimally reactive, no ophthalmoparesis noted, no nystagmus noted, no facial asymmetry noted.     Motor Exam Decreased tone in all 4 extremities, patient did not elevate " extremities spontaneously, patient was unable to maintain limb elevated, dropped to bed.  Patient did demonstrate minimal withdrawal to noxious stimuli in all 4 extremities.  Patient demonstrated decorticate posturing in right upper extremity.     Sensory Exam   Minimal withdrawal in all 4 extremities, vibration and proprioception and temperature sensation unable to completed in setting of acute stroke.     Gait, Coordination, and Reflexes Examination deferred in setting of acute stroke.  Coordination unable to completed, patient does not follow commands.  No tremor noted.  Reflexes in bilateral upper extremities and brachioradialis, biceps at 2+.  Patellar and Achilles bilaterally 2+.  Plantar upgoing bilaterally.  No Jessica's bilaterally.  Right lower extremity demonstrated 5 beats of clonus.  Left lower extremity demonstrated no clonus.     Lab Results: I have personally reviewed pertinent reports.  CBC:   Results from last 7 days   Lab Units 05/10/24  0742   WBC Thousand/uL 10.91*   RBC Million/uL 4.59   HEMOGLOBIN g/dL 12.3   HEMATOCRIT % 41.0   MCV fL 89   PLATELETS Thousands/uL 209     Imaging Studies: I have personally reviewed pertinent reports.    EKG, Pathology, and Other Studies: I have personally reviewed pertinent reports.    VTE Prophylaxis: Reason for no pharmacologic prophylaxis [facility]    Code Status: Level 1 - Full Code  Advance Directive and Living Will:      Power of :    POLST:      Counseling / Coordination of Care: Please see attending's attestation

## 2024-05-10 NOTE — ANESTHESIA POSTPROCEDURE EVALUATION
Post-Op Assessment Note    CV Status:  Stable    Pain management: adequate       Mental Status:  Lethargic (pt sedated)   Hydration Status:  Stable   PONV Controlled:  Controlled   Airway Patency:  Patent  Airway: intubated     Post Op Vitals Reviewed: Yes    No anethesia notable event occurred.    Staff: Anesthesiologist, CRNA               BP   139/74   Temp (!) 94 °F (34.4 °C) (05/10/24 1325)    Pulse (!) 48 (05/10/24 1325)   Resp 18 (05/10/24 1325)    SpO2 95 % (05/10/24 1325)

## 2024-05-10 NOTE — CONSULTS
[facility]  Consult  Name: Aleah Newton 53 y.o. female I MRN: [id number]  Unit/Bed#: OR [place] I Date of Admission: 5/10/2024   Date of Service: 5/10/2024 I Hospital Day: 0    Consults    Assessment/Plan   Right-sided nontraumatic intracerebral hemorrhage (HCC)  Assessment & Plan  R parietal ICH   S/p S/p ACOM aneurysm web embolization with Dr. Salter 4/17/2024  Awoke 5/10/2024 with severe headache, L sided weakness   progression to unresponsiveness with decorticate posturing on arrival to ED     Intracerebral Hemorrhage (ICH) Score:    Merrill Coma Sore 3-4 equals +2   Age greater than or equal to 80 No   ICH Volume greater than or equal to 30 ml Yes (1 Point)   Intraventricular Hemorrhage No   Infratentorial Origin of Hemorrhage No   Total: 3       Imaging:   CT head 5/10/2024: acute large heterogeneous parenchymal hemorrhage in the R parietal region. Associated edema with 1.2cm midline shift and effacement on the suprasellar cistern.  CTA 5/10/2024: negative for any acute vascular etiology assocated with R parietal parenchymal hemorrhage. Stable appearance of acom web embolization.     Plan:   Patient transferred emergently to [facility]  STAT CT head on arrival showed progression compared to present CT head  Patient taken emergently to OR with neurosurgery for decompressive R hemicraniectomy  Plan for ICU post operatively  NCC following   Will need to continue platelet transfusions for 24 hours given hemorrhage with previous DAPT use   Neurosurgery will continue to follow     Anterior communicating artery aneurysm  Assessment & Plan  S/p ACOM aneurysm web embolization with Dr. Salter 4/17/2024  Procedure went well without complications  Discharge next day 4/18/2024 on DAPT- ASA 81mg QD, brilinta 90mg BID          History of Present Illness   HPI: Aleah Newton is a 53 y.o. female with PMH including anemia, vertigo, hyperlipidemia, hypothyroidism, cerebral aneurysm who  presented with acute onset headache and left-sided weakness.  Last known normal was prior to bed yesterday.  This morning patient awoke with a severe headache left-sided weakness and diaphoresis.  By the time she presented to the ER she was noted to be unresponsive with decorticate posturing.  She was intubated for airway protection and she was taken immediately to CT which demonstrated a large right parietal parenchymal hemorrhage.  Patient was given DDAVP as well as hypertonic saline prior to transfer.  Upon presenting to [facility] she was taken immediately for repeat CT head given the heterogeneous appearance of her presenting CT scan.  Her CT showed slight progression.  On examination she was noted to have fixed and dilated pupils bilaterally with reflexive movement in the right lower extremity only.  She was taken for emergent right-sided hemicraniectomy.    Review of Systems   Unable to perform ROS: Intubated       Historical Information   Past Medical History:   Diagnosis Date    Abnormal Pap smear of cervix     Anemia     Arthritis     Asthma     Colon polyp     History of vertigo     Hyperlipemia     triglycerides    Hypertriglyceridemia     Hypothyroidism     Impaired fasting glucose     Insomnia     Lab test positive for detection of COVID-19 virus 12/22/2021    Premenstrual tension syndrome     Tendonitis      Past Surgical History:   Procedure Laterality Date    COLONOSCOPY  2016    COLONOSCOPY  11/04/2023    EMBOLIZATION ANEURYSM N/A 04/17/2024    IR CEREBRAL ANGIOGRAPHY  01/08/2024    IR CEREBRAL ANGIOGRAPHY / INTERVENTION  4/17/2024    MAMMO (HISTORICAL) Bilateral 11/03/2020    WISDOM TOOTH EXTRACTION       Social History     Substance and Sexual Activity   Alcohol Use Yes    Comment: social      Social History     Substance and Sexual Activity   Drug Use Never     Social History     Tobacco Use   Smoking Status Never   Smokeless Tobacco Never     Family History   Problem Relation Age of Onset    Colon  cancer Mother 72    Diabetes Mother     Hypertension Mother     Heart disease Father     Hyperlipidemia Father     Heart attack Father     Heart failure Father     Alzheimer's disease Maternal Aunt     No Known Problems Son     No Known Problems Sister     Breast cancer Maternal Grandmother         50's ?    No Known Problems Maternal Grandfather     Brain cancer Paternal Grandmother         young age    No Known Problems Paternal Grandfather     No Known Problems Paternal Uncle        Meds/Allergies   all current active meds have been reviewed, current meds:   Current Facility-Administered Medications   Medication Dose Route Frequency    [Transfer Hold] chlorhexidine (PERIDEX) 0.12 % oral rinse 15 mL  15 mL Mouth/Throat Q12H [facility]     Facility-Administered Medications Ordered in Other Encounters   Medication Dose Route Frequency    calcium chloride 10 % injection   Intravenous PRN    ceFAZolin (ANCEF) IVPB (premix in dextrose)   Intravenous PRN    insulin regular (HumuLIN R,NovoLIN R) injection   Intravenous PRN    levETIRAcetam (KEPPRA) injection   Intravenous PRN    phenylephrine (MAXIMINO-SYNEPHRINE) 50 mg (STANDARD CONCENTRATION) in sodium chloride 0.9% 250 mL   Intravenous Continuous PRN    potassium chloride 20 mEq IVPB (premix)   Intravenous Continuous PRN    ROCuronium (ZEMURON) injection   Intravenous PRN    sodium chloride 0.9 % infusion   Intravenous Continuous PRN    sodium chloride 0.9 % infusion   Intravenous Continuous PRN   , and PTA meds:   Prior to Admission Medications   Prescriptions Last Dose Informant Patient Reported? Taking?   ALBUTEROL IN  Self Yes No   Sig: Inhale if needed   Multiple Vitamin (MULTI-VITAMIN DAILY PO)   Yes No   Sig: Take by mouth in the morning   Zolpidem Tartrate (AMBIEN PO)   Yes No   Sig: Take by mouth   aspirin (ECOTRIN LOW STRENGTH) 81 mg EC tablet   No No   Sig: Take 1 tablet (81 mg total) by mouth daily   fluocinonide (LIDEX) 0.05 % cream   No No   Sig: Apply twice a day  "to the back as needed only when pink and itchy.   hydroquinone 4 % cream   No No   Sig: Apply topically 2 (two) times a day To the face (dark area) only.   levothyroxine 50 mcg tablet   No No   Sig: TAKE 1 TABLET BY MOUTH EVERY DAY   loratadine-pseudoephedrine (CLARITIN-D 12-HOUR) 5-120 mg per tablet   No No   Sig: Take 1 tablet by mouth daily as needed for allergies   ticagrelor (BRILINTA) 90 MG   No No   Sig: Take 1 tablet (90 mg total) by mouth every 12 (twelve) hours      Facility-Administered Medications: None     Allergies   Allergen Reactions    Niacin Hives     In high doses   (Flushing)    Mucinex Fast-Max Chest Scott Ms [Guaifenesin] Palpitations       Objective   I/O         05/08 0701  05/09 0700 05/09 0701  05/10 0700 05/10 0701  05/11 0700    Blood   800    Total Intake   800    Urine   1650    Total Output   1650    Net   -850                   Physical Exam  Neurological:      Comments: GCS 3T. E1, V1T, M1. Patient with trace reflexive response to pain in the RLE. No motor response to pain noted otherwise. Pupils 6mm bilaterally with very trace reactivity. Cough and gag were not assessed at this time given the acuity of patients condition and need for emergent decompression.          Vitals:Last menstrual period 09/12/2022.,There is no height or weight on file to calculate BMI.     Lab Results:   Results from last 7 days   Lab Units 05/10/24  1033 05/10/24  0742   WBC Thousand/uL 12.48* 10.91*   HEMOGLOBIN g/dL 10.4* 12.3   HEMATOCRIT % 32.4* 41.0   PLATELETS Thousands/uL 431* 209     Results from last 7 days   Lab Units 05/10/24  0923 05/10/24  0742   SODIUM mmol/L 147 135   POTASSIUM mmol/L 3.0* 3.9   CHLORIDE mmol/L 116* 105   CO2 mmol/L 19* 20*   BUN mg/dL 12 13   CREATININE mg/dL 0.78 0.85   CALCIUM mg/dL 7.7* 8.5             Results from last 7 days   Lab Units 05/10/24  0923 05/10/24  0742   INR  1.04 0.87   PTT seconds 26 25     No results found for: \"TROPONINT\"  ABG:No results found for: " "\"PHART\", \"SOT1QJV\", \"PO2ART\", \"ULF6NSJ\", \"B8FJNYNI\", \"BEART\", \"SOURCE\"    Imaging Studies: I have personally reviewed pertinent reports.   and I have personally reviewed pertinent films in PACS    CTA stroke alert (head/neck)    Result Date: 5/10/2024  Impression: CTA head: -New large right parietotemporal intraparenchymal hemorrhage with no suspicious underlying vascular malformation. Paucity of vascularity within the right posterior MCA territory related to the hemorrhage. -Negative for large vessel intracranial occlusion or hemodynamically significant stenosis. -ACOM Web embolization device with mild enhancement surrounding the intra saccular device similar to the recent MRA examination. -Focal fenestration along the right PCA P1 segment. CTA neck:  No extracranial carotid stenosis.  The cervical vertebral arteries are patent. I personally discussed this study with ANDREA STUBBS on 5/10/2024 7:44 AM. Workstation performed: [id number]     CT stroke alert brain    Result Date: 5/10/2024  Impression: A NEW large, heterogeneous intraparenchymal hemorrhage is noted within the right parietal -temporal region compared to the recent MRA study of 5/2/2024 measuring 7.2 x 4.0 x 6.2 cm (AP x ML x CC) (volume = 89.3 cc). There is associated vasogenic edema with 1.2 cm leftward midline shift and partial effacement of the suprasellar cistern. I personally discussed this study with ANDREA STUBBS on 5/10/2024 7:44 AM. Workstation performed: [id number]       EKG, Pathology, and Other Studies: I have personally reviewed pertinent reports.      VTE Prophylaxis: Sequential compression device (Venodyne)     Code Status: Level 1 - Full Code  Advance Directive and Living Will:      Power of :    POLST:      Counseling / Coordination of Care  I spent 30 minutes with the patient.      "

## 2024-05-10 NOTE — ED ATTENDING ATTESTATION
5/10/2024  I, Krystian Madera DO, saw and evaluated the patient. I have discussed the patient with the resident/non-physician practitioner and agree with the resident's/non-physician practitioner's findings, Plan of Care, and MDM as documented in the resident's/non-physician practitioner's note, except where noted. All available labs and Radiology studies were reviewed.  I was present for key portions of any procedure(s) performed by the resident/non-physician practitioner and I was immediately available to provide assistance.       At this point I agree with the current assessment done in the Emergency Department.  I have conducted an independent evaluation of this patient a history and physical is as follows:    Patient is a 53-year-old female with a history of 5 mm anterior communicating artery aneurysm embolization who presents with change in mental status.  Patient was discharged from the hospital on 4/18/2024 after the procedure and was placed on aspirin and Brilinta.  Patient was brought in by EMS this morning with change in mental status.  Patient was normal upon going to sleep last evening at 10:30 PM.  Upon awakening this morning, she had left-sided deficits and a headache.  During transport, patient became less responsive.  Upon arrival, patient was unable to provide any history but was responsive to painful stimuli.  She is handling secretions. She was made a prehospital stroke alert and went directly to CT.    On exam, patient initially responsive to painful stimuli.  Nonsensical sounds.  After CT, she developed decerebrate posturing.  Dilated and nonreactive right pupil. No longer arousable to painful stimuli. Patient intubated emergently for airway protection.  Paralysis limits further physical exam.    Prehospital stroke alert called and patient went directly to CT.  CT noncontrast reveals large intraparenchymal hemorrhage.  Neurology was at bedside as well and immediately contacted neurocritical  "care while patient was taken to a room for intubation.  Intubation was performed successfully without difficulty.  She was given fentanyl and placed on propofol for sedation.  Nicardipine infusion ordered to maintain systolic blood pressure less than 160 mmHg.  I was notified by neurology that patient was accepted for transfer to neuro ICU by Dr. Turner.  She recommended DDAVP as well as blood pressure control.  I reached out to neurosurgery as well who is aware of the case.  They confirmed dosing of DDAVP and agreed with mannitol or 3% hypertonic saline for increased ICP.  I spoke with pharmacy who ordered DDAVP 0.4 mcg/kg on my behalf while I caring for patient at bedside.    Transport arrived and DDAVP is being administered.  Will also run 250 mL of 3% hypertonic saline during transport. Dr Saucedo has updated  and patient's condition.    ED Course  ED Course as of 05/10/24 1007   Fri May 10, 2024   0744 Neurology spoke with neurocritical care who recommends DDAVP and systolic blood pressure less than 160 mmHg   0744 Patient intubated.  Will reach out to neurosurgery as well as confirm dose of DDAVP.   0757 Transport is present.  They are hanging 3% hypertonic saline.  Will give DDAVP prior to transfer.     Portions of the above record have been created with voice recognition software.  Occasional wrong word or \"sound alike\" substitutions may have occurred due to the inherent limitations of voice recognition software.  Read the chart carefully and recognize, using context, where substitutions may have occurred.      Critical Care Time  CriticalCare Time    Date/Time: 5/10/2024 10:07 AM    Performed by: Krystian Madera DO  Authorized by: Krystian Madera DO    Critical care provider statement:     Critical care time (minutes):  30    Critical care time was exclusive of:  Separately billable procedures and treating other patients    Critical care was necessary to treat or prevent imminent or " life-threatening deterioration of the following conditions:  CNS failure or compromise    Critical care was time spent personally by me on the following activities:  Blood draw for specimens, obtaining history from patient or surrogate, development of treatment plan with patient or surrogate, discussions with consultants, interpretation of cardiac output measurements, ordering and performing treatments and interventions, ordering and review of laboratory studies, ordering and review of radiographic studies, re-evaluation of patient's condition and review of old charts

## 2024-05-10 NOTE — OP NOTE
OPERATIVE REPORT  PATIENT NAME: Aleah Newton    :  1970  MRN: [id number]  Pt Location: [facility] OR ROOM 08    SURGERY DATE: 5/10/2024    Surgeons and Role:     * Craig Robert Goldberg, MD - Primary     * Karishma Mistry PA-C - Assisting    Preop Diagnosis:  Stroke (HCC) [I63.9]    Post-Op Diagnosis Codes:     * Stroke (HCC) [I63.9]    Procedure(s):  Right - CRANIECTOMY    Specimen(s):  * No specimens in log *    Estimated Blood Loss:   300 mL    Drains:  Closed/Suction Drain Right Scalp Bulb 7 Fr. (Active)   Number of days: 0       NG/OG/Enteral Tube Nasogastric 16 Fr Right nare (Active)   Placement Reverification Auscultation 05/10/24 0746   Site Assessment Clean;Dry;Intact 05/10/24 0746   Number of days: 0       Urethral Catheter (Active)   Number of days: 0       Anesthesia Type:   General    Operative Indications:  Stroke (HCC) [I63.9]  Right hemispheric hemorrhagic intraparenchymal hemorrhage on Brillinta and ASA    Operative Findings:  Patient's pupils were 6mm and minimally reactive preop.  Right hemicraniectomy performed.  ICH evacuated.    Complications:   None    Procedure and Technique:  The head was shaved on the right.  Large question-jake shaped curvilinear incision on the right side of the scalp was marked out from anterior to the tragus, around the ear, toward the midline anteriorly.   The head was prepped and draped in the usual sterile fashion.  Timeout was performed per protocol.  Lidocaine with epinephrine was used.    The incision was opened with 10 blade and carried down to skull.  Hemostasis with Bovie and Diego clips was achieved.  Periosteum was elevated.  The scalp flap was held in place over a rolled sponge, using 2-0 silk sutures and clamps.  Hemostasis again obtained with difficulty despite platelet transfusion, DDAVP.  Three Fam  holes were made using the [employer] raul drill at the keyhole point, at the inferior temporal fossa and posterior to the parietal boss. A flap was created  with the craniotome attachement and elevated and removed using a periosteal elevator.  Dura was discolored purple from the underlying intraparenchymal hematoma.  The dura was opened in the usual fashion temporally, then anteriorly to posteriorly.   Hemostasis was obtained throughout as best as possible using bipolar quartery, gelfoam, thrombin, and surgiflo and peroxide.  Duragen was then used to supplement the native dura to cover the brain surface.  A 7mm flat MORALES drain was trimmed to an appropriate size and placed in the epidural space.  The drain was then tunneled and sewn in place.   Cranial incisions closed with 0 vicryl and staples and dressed sterilely.  Bone was saved for potential reinsertion.    Counts were correct for needles, sponges, and cottonoids.    I was present for the entire procedure., A qualified resident physician was not available., and A physician assistant was required during the procedure for retraction, tissue handling, dissection and suturing.    Patient Disposition:  To CT then ICU intubated.  Pupils remained 6mm.  Left pupil may have had slight reaction to light        SIGNATURE: Craig Robert Goldberg, MD  DATE: May 10, 2024  TIME: 1:42 PM

## 2024-05-10 NOTE — PLAN OF CARE
Problem: NEUROSENSORY - ADULT  Goal: Achieves stable or improved neurological status  Description: INTERVENTIONS  - Monitor and report changes in neurological status  - Monitor vital signs such as temperature, blood pressure, glucose, and any other labs ordered   - Initiate measures to prevent increased intracranial pressure  - Monitor for seizure activity and implement precautions if appropriate      Outcome: Progressing  Goal: Remains free of injury related to seizures activity  Description: INTERVENTIONS  - Maintain airway, patient safety  and administer oxygen as ordered  - Monitor patient for seizure activity, document and report duration and description of seizure to physician/advanced practitioner  - If seizure occurs,  ensure patient safety during seizure  - Reorient patient post seizure  - Seizure pads on all 4 side rails  - Instruct patient/family to notify RN of any seizure activity including if an aura is experienced  - Instruct patient/family to call for assistance with activity based on nursing assessment  - Administer anti-seizure medications if ordered    Outcome: Progressing     Problem: CARDIOVASCULAR - ADULT  Goal: Maintains optimal cardiac output and hemodynamic stability  Description: INTERVENTIONS:  - Monitor I/O, vital signs and rhythm  - Monitor for S/S and trends of decreased cardiac output  - Administer and titrate ordered vasoactive medications to optimize hemodynamic stability  - Assess quality of pulses, skin color and temperature  - Assess for signs of decreased coronary artery perfusion  - Instruct patient to report change in severity of symptoms  Outcome: Progressing  Goal: Absence of cardiac dysrhythmias or at baseline rhythm  Description: INTERVENTIONS:  - Continuous cardiac monitoring, vital signs, obtain 12 lead EKG if ordered  - Administer antiarrhythmic and heart rate control medications as ordered  - Monitor electrolytes and administer replacement therapy as ordered  Outcome:  Progressing     Problem: RESPIRATORY - ADULT  Goal: Achieves optimal ventilation and oxygenation  Description: INTERVENTIONS:  - Assess for changes in respiratory status  - Assess for changes in mentation and behavior  - Position to facilitate oxygenation and minimize respiratory effort  - Oxygen administered by appropriate delivery if ordered  - Initiate smoking cessation education as indicated  - Encourage broncho-pulmonary hygiene including cough, deep breathe, Incentive Spirometry  - Assess the need for suctioning and aspirate as needed  - Assess and instruct to report SOB or any respiratory difficulty  - Respiratory Therapy support as indicated  Outcome: Progressing     Problem: GASTROINTESTINAL - ADULT  Goal: Minimal or absence of nausea and/or vomiting  Description: INTERVENTIONS:  - Administer IV fluids if ordered to ensure adequate hydration  - Maintain NPO status until nausea and vomiting are resolved  - Nasogastric tube if ordered  - Administer ordered antiemetic medications as needed  - Provide nonpharmacologic comfort measures as appropriate  - Advance diet as tolerated, if ordered  - Consider nutrition services referral to assist patient with adequate nutrition and appropriate food choices  Outcome: Progressing  Goal: Maintains or returns to baseline bowel function  Description: INTERVENTIONS:  - Assess bowel function  - Encourage oral fluids to ensure adequate hydration  - Administer IV fluids if ordered to ensure adequate hydration  - Administer ordered medications as needed  - Encourage mobilization and activity  - Consider nutritional services referral to assist patient with adequate nutrition and appropriate food choices  Outcome: Progressing  Goal: Maintains adequate nutritional intake  Description: INTERVENTIONS:  - Monitor percentage of each meal consumed  - Identify factors contributing to decreased intake, treat as appropriate  - Assist with meals as needed  - Monitor I&O, weight, and lab  values if indicated  - Obtain nutrition services referral as needed  Outcome: Progressing  Goal: Oral mucous membranes remain intact  Description: INTERVENTIONS  - Assess oral mucosa and hygiene practices  - Implement preventative oral hygiene regimen  - Implement oral medicated treatments as ordered  - Initiate Nutrition services referral as needed  Outcome: Progressing     Problem: GENITOURINARY - ADULT  Goal: Maintains or returns to baseline urinary function  Description: INTERVENTIONS:  - Assess urinary function  - Encourage oral fluids to ensure adequate hydration if ordered  - Administer IV fluids as ordered to ensure adequate hydration  - Administer ordered medications as needed  - Offer frequent toileting  - Follow urinary retention protocol if ordered  Outcome: Progressing  Goal: Urinary catheter remains patent  Description: INTERVENTIONS:  - Assess patency of urinary catheter  - If patient has a chronic powell, consider changing catheter if non-functioning  - Follow guidelines for intermittent irrigation of non-functioning urinary catheter  Outcome: Progressing     Problem: METABOLIC, FLUID AND ELECTROLYTES - ADULT  Goal: Electrolytes maintained within normal limits  Description: INTERVENTIONS:  - Monitor labs and assess patient for signs and symptoms of electrolyte imbalances  - Administer electrolyte replacement as ordered  - Monitor response to electrolyte replacements, including repeat lab results as appropriate  - Instruct patient on fluid and nutrition as appropriate  Outcome: Progressing  Goal: Fluid balance maintained  Description: INTERVENTIONS:  - Monitor labs   - Monitor I/O and WT  - Instruct patient on fluid and nutrition as appropriate  - Assess for signs & symptoms of volume excess or deficit  Outcome: Progressing     Problem: SKIN/TISSUE INTEGRITY - ADULT  Goal: Skin Integrity remains intact(Skin Breakdown Prevention)  Description: Assess:  -Perform Vamshi assessment every shift  -Clean and  moisturize skin every shift  -Inspect skin when repositioning, toileting, and assisting with ADLS  -Assess under medical devices every shift  -Assess extremities for adequate circulation and sensation     Bed Management:  -Have minimal linens on bed & keep smooth, unwrinkled  -Change linens as needed when moist or perspiring  -Avoid sitting or lying in one position for more than 2 hours while in bed  -Keep HOB at 30 degrees     Toileting:  -Offer bedside commode  -Assess for incontinence every 2 hours  -Use incontinent care products after each incontinent episode     Activity:  -Mobilize patient 2 times a day  -Encourage or provide ROM exercises   -Turn and reposition patient every 2 Hours  -Use appropriate equipment to lift or move patient in bed    Skin Care:  -Avoid use of baby powder, tape, friction and shearing, hot water or constrictive clothing  -Relieve pressure over bony prominences   -Do not massage red bony areas    Next Steps:  -Teach patient strategies to minimize risks    -Consider consults to  interdisciplinary teams  Outcome: Progressing  Goal: Incision(s), wounds(s) or drain site(s) healing without S/S of infection  Description: INTERVENTIONS  - Assess and document dressing, incision, wound bed, drain sites and surrounding tissue  - Provide patient and family education  - Perform skin care/dressing changes every shift or PRN  Outcome: Progressing     Problem: HEMATOLOGIC - ADULT  Goal: Maintains hematologic stability  Description: INTERVENTIONS  - Assess for signs and symptoms of bleeding or hemorrhage  - Monitor labs  - Administer supportive blood products/factors as ordered and appropriate  Outcome: Progressing     Problem: MUSCULOSKELETAL - ADULT  Goal: Maintain or return mobility to safest level of function  Description: INTERVENTIONS:  - Assess patient's ability to carry out ADLs; assess patient's baseline for ADL function and identify physical deficits which impact ability to perform ADLs  (bathing, care of mouth/teeth, toileting, grooming, dressing, etc.)  - Assess/evaluate cause of self-care deficits   - Assess range of motion  - Assess patient's mobility  - Assess patient's need for assistive devices and provide as appropriate  - Encourage maximum independence but intervene and supervise when necessary  - Involve family in performance of ADLs  - Assess for home care needs following discharge   - Consider OT consult to assist with ADL evaluation and planning for discharge  - Provide patient education as appropriate  Outcome: Progressing     Problem: COPING  Goal: Pt/Family able to verbalize concerns and demonstrate effective coping strategies  Description: INTERVENTIONS:  - Assist patient/family to identify coping skills, available support systems and cultural and spiritual values  - Provide emotional support, including active listening and acknowledgement of concerns of patient and caregivers  - Reduce environmental stimuli, as able  - Provide patient education  - Assess for spiritual pain/suffering and initiate spiritual care, including notification of Pastoral Care or fani based community as needed  - Assess effectiveness of coping strategies  Outcome: Progressing  Goal: Will report anxiety at manageable levels  Description: INTERVENTIONS:  - Administer medication as ordered  - Teach and encourage coping skills  - Provide emotional support  - Assess patient/family for anxiety and ability to cope  Outcome: Progressing     Problem: Potential for Falls  Goal: Patient will remain free of falls  Description: INTERVENTIONS:  - Educate patient/family on patient safety including physical limitations  - Instruct patient to call for assistance with activity   - Consult OT/PT to assist with strengthening/mobility   - Keep Call bell within reach  - Keep bed low and locked with side rails adjusted as appropriate  - Keep care items and personal belongings within reach  - Initiate and maintain comfort  rounds  - Make Fall Risk Sign visible to staff  - Offer Toileting every 2 Hours, in advance of need  - Initiate/Maintain Bed/Chair alarm  - Obtain necessary fall risk management equipment  - Apply yellow socks and bracelet for high fall risk patients  - Consider moving patient to room near nurses station  Outcome: Progressing     Problem: Nutrition/Hydration-ADULT  Goal: Nutrient/Hydration intake appropriate for improving, restoring or maintaining nutritional needs  Description: Monitor and assess patient's nutrition/hydration status for malnutrition. Collaborate with interdisciplinary team and initiate plan and interventions as ordered.  Monitor patient's weight and dietary intake as ordered or per policy. Utilize nutrition screening tool and intervene as necessary. Determine patient's food preferences and provide high-protein, high-caloric foods as appropriate.     INTERVENTIONS:  - Monitor oral intake, urinary output, labs, and treatment plans  - Assess nutrition and hydration status and recommend course of action  - Evaluate amount of meals eaten  - Assist patient with eating if necessary   - Allow adequate time for meals  - Recommend/ encourage appropriate diets, oral nutritional supplements, and vitamin/mineral supplements  - Order, calculate, and assess calorie counts as needed  - Recommend, monitor, and adjust tube feedings and TPN/PPN based on assessed needs  - Assess need for intravenous fluids  - Provide specific nutrition/hydration education as appropriate  - Include patient/family/caregiver in decisions related to nutrition  Outcome: Progressing     Problem: Neurological Deficit  Goal: Neurological status is stable or improving  Description: Interventions:  - Monitor and assess patient's level of consciousness, motor function, sensory function, and level of assistance needed for ADLs.   - Monitor and report changes from baseline. Collaborate with interdisciplinary team to initiate plan and implement  interventions as ordered.   - Provide and maintain a safe environment.  - Consider seizure precautions.  - Consider fall precautions.  - Consider aspiration precautions.  - Consider bleeding precautions.  Outcome: Progressing     Problem: Activity Intolerance/Impaired Mobility  Goal: Mobility/activity is maintained at optimum level for patient  Description: Interventions:  - Assess and monitor patient  barriers to mobility and need for assistive/adaptive devices.  - Assess patient's emotional response to limitations.  - Collaborate with interdisciplinary team and initiate plans and interventions as ordered.  - Encourage independent activity per ability.  - Maintain proper body alignment.  - Perform active/passive rom as tolerated/ordered.  - Plan activities to conserve energy.  - Turn patient as appropriate  Outcome: Progressing     Problem: Potential for Aspiration  Goal: Ventilated patient's risk of aspiration is minimized  Description: Assess and monitor vital signs, respiratory status, airway cuff pressure, and labs (WBC).  Monitor for signs of aspiration (tachypnea, cough, rales, wheezing, cyanosis, fever).    - Elevate head of bed 30 degrees if patient has tube feeding.  - Monitor tube feeding.  Outcome: Progressing     Problem: Nutrition  Goal: Nutrition/Hydration status is improving  Description: Monitor and assess patient's nutrition/hydration status for malnutrition (ex- brittle hair, bruises, dry skin, pale skin and conjunctiva, muscle wasting, smooth red tongue, and disorientation). Collaborate with interdisciplinary team and initiate plan and interventions as ordered.  Monitor patient's weight and dietary intake as ordered or per policy. Utilize nutrition screening tool and intervene per policy. Determine patient's food preferences and provide high-protein, high-caloric foods as appropriate.     - Assist patient with eating.  - Allow adequate time for meals.  - Encourage patient to take dietary  supplement as ordered.  - Collaborate with clinical nutritionist.  - Include patient/family/caregiver in decisions related to nutrition.  Outcome: Progressing     Problem: PAIN - ADULT  Goal: Verbalizes/displays adequate comfort level or baseline comfort level  Description: Interventions:  - Encourage patient to monitor pain and request assistance  - Assess pain using appropriate pain scale  - Administer analgesics based on type and severity of pain and evaluate response  - Implement non-pharmacological measures as appropriate and evaluate response  - Consider cultural and social influences on pain and pain management  - Notify physician/advanced practitioner if interventions unsuccessful or patient reports new pain  Outcome: Progressing     Problem: INFECTION - ADULT  Goal: Absence or prevention of progression during hospitalization  Description: INTERVENTIONS:  - Assess and monitor for signs and symptoms of infection  - Monitor lab/diagnostic results  - Monitor all insertion sites, i.e. indwelling lines, tubes, and drains  - Monitor endotracheal if appropriate and nasal secretions for changes in amount and color  - [place] appropriate cooling/warming therapies per order  - Administer medications as ordered  - Instruct and encourage patient and family to use good hand hygiene technique  - Identify and instruct in appropriate isolation precautions for identified infection/condition  Outcome: Progressing     Problem: SAFETY ADULT  Goal: Patient will remain free of falls  Description: INTERVENTIONS:  - Educate patient/family on patient safety including physical limitations  - Instruct patient to call for assistance with activity   - Consult OT/PT to assist with strengthening/mobility   - Keep Call bell within reach  - Keep bed low and locked with side rails adjusted as appropriate  - Keep care items and personal belongings within reach  - Initiate and maintain comfort rounds  - Make Fall Risk Sign visible to  staff  - Offer Toileting every 2 Hours, in advance of need  - Initiate/Maintain Bed/Chair alarm  - Obtain necessary fall risk management equipment  - Apply yellow socks and bracelet for high fall risk patients  - Consider moving patient to room near nurses station  Outcome: Progressing     Problem: DISCHARGE PLANNING  Goal: Discharge to home or other facility with appropriate resources  Description: INTERVENTIONS:  - Identify barriers to discharge w/patient and caregiver  - Arrange for needed discharge resources and transportation as appropriate  - Identify discharge learning needs (meds, wound care, etc.)  - Arrange for interpretive services to assist at discharge as needed  - Refer to Case Management Department for coordinating discharge planning if the patient needs post-hospital services based on physician/advanced practitioner order or complex needs related to functional status, cognitive ability, or social support system  Outcome: Progressing     Problem: Knowledge Deficit  Goal: Patient/family/caregiver demonstrates understanding of disease process, treatment plan, medications, and discharge instructions  Description: Complete learning assessment and assess knowledge base.  Interventions:  - Provide teaching at level of understanding  - Provide teaching via preferred learning methods  Outcome: Progressing

## 2024-05-10 NOTE — ANESTHESIA PREPROCEDURE EVALUATION
Procedure:  CRANIECTOMY (Right: Head)     - on home ASA + ticagrelor    CTA head:  -New large right parietotemporal intraparenchymal hemorrhage with no suspicious underlying vascular malformation. Paucity of vascularity within the right posterior MCA territory related to the hemorrhage.  -Negative for large vessel intracranial occlusion or hemodynamically significant stenosis.  -ACOM Web embolization device with mild enhancement surrounding the intra saccular device similar to the recent MRA examination.  -Focal fenestration along the right PCA P1 segment.    Relevant Problems   ANESTHESIA (within normal limits)      CARDIO (within normal limits)      ENDO   (+) Acquired hypothyroidism      GI/HEPATIC (within normal limits)      /RENAL (within normal limits)      GYN (within normal limits)      HEMATOLOGY (within normal limits)      MUSCULOSKELETAL   (+) Patellofemoral arthritis of right knee      NEURO/PSYCH   (+) Intraparenchymal hemorrhage of brain (HCC)      PULMONARY   (+) Moderate persistent asthma with acute exacerbation      Lab Results   Component Value Date    WBC 10.91 (H) 05/10/2024    HGB 12.3 05/10/2024    HCT 41.0 05/10/2024    MCV 89 05/10/2024     05/10/2024     Lab Results   Component Value Date    SODIUM 135 05/10/2024    K 3.9 05/10/2024     05/10/2024    CO2 20 (L) 05/10/2024    AGAP 10 05/10/2024    BUN 13 05/10/2024    CREATININE 0.85 05/10/2024    GLUC 236 (H) 05/10/2024    GLUF 110 (H) 04/08/2024    CALCIUM 8.5 05/10/2024    AST 14 04/08/2024    ALT 11 04/08/2024    ALKPHOS 55 04/08/2024    TP 7.5 04/08/2024    TBILI 0.66 04/08/2024    EGFR 77 05/10/2024     Lab Results   Component Value Date    PTT 25 05/10/2024     Lab Results   Component Value Date    INR 0.87 05/10/2024    INR 1.00 04/08/2024    INR 1.01 12/11/2023    PROTIME 12.4 05/10/2024    PROTIME 13.1 04/08/2024    PROTIME 13.2 12/11/2023         Physical Exam    Airway    Mallampati score: II  TM Distance: >3  FB  Neck ROM: full     Dental   No notable dental hx     Cardiovascular  Cardiovascular exam normal    Pulmonary  Pulmonary exam normal     Other Findings  post-pubertal.      Anesthesia Plan  ASA Score- 5 Emergent    Anesthesia Type- general with ASA Monitors.         Additional Monitors:     Airway Plan: ETT.           Plan Factors-    Chart reviewed. EKG reviewed. Imaging results reviewed.  Patient summary reviewed.                  Induction- intravenous.    Postoperative Plan-     Informed Consent- Plan/risks discussed with: emergent.  I personally reviewed this patient with the CRNA. Discussed and agreed on the Anesthesia Plan with the CRNA..

## 2024-05-10 NOTE — ED PROVIDER NOTES
History  No chief complaint on file.    Aleah is a 53 year old female with history of anterior communicating artery aneurysm s/p embolization 3 weeks ago, presenting via EMS with complaint of headache and left-sided weakness.  Per , last known normal was last evening around 10 PM before she went to bed.  She woke this morning and described having a severe headache, she was diaphoretic, complaining of left-sided weakness.  By the time the patient arrived in the emergency department, she was unresponsive, not opening eyes to stimulation, exhibiting decorticate posturing.  She was immediately taken to the CT scanner as a prehospital stroke alert.       History provided by:  Medical records and spouse   used: No        Prior to Admission Medications   Prescriptions Last Dose Informant Patient Reported? Taking?   ALBUTEROL IN  Self Yes No   Sig: Inhale if needed   Multiple Vitamin (MULTI-VITAMIN DAILY PO)   Yes No   Sig: Take by mouth in the morning   Zolpidem Tartrate (AMBIEN PO)   Yes No   Sig: Take by mouth   aspirin (ECOTRIN LOW STRENGTH) 81 mg EC tablet   No No   Sig: Take 1 tablet (81 mg total) by mouth daily   fluocinonide (LIDEX) 0.05 % cream   No No   Sig: Apply twice a day to the back as needed only when pink and itchy.   hydroquinone 4 % cream   No No   Sig: Apply topically 2 (two) times a day To the face (dark area) only.   levothyroxine 50 mcg tablet   No No   Sig: TAKE 1 TABLET BY MOUTH EVERY DAY   loratadine-pseudoephedrine (CLARITIN-D 12-HOUR) 5-120 mg per tablet   No No   Sig: Take 1 tablet by mouth daily as needed for allergies   ticagrelor (BRILINTA) 90 MG   No No   Sig: Take 1 tablet (90 mg total) by mouth every 12 (twelve) hours      Facility-Administered Medications: None       Past Medical History:   Diagnosis Date    Abnormal Pap smear of cervix     Anemia     Arthritis     Asthma     Colon polyp     History of vertigo     Hyperlipemia     triglycerides     Hypertriglyceridemia     Hypothyroidism     Impaired fasting glucose     Insomnia     Lab test positive for detection of COVID-19 virus 12/22/2021    Premenstrual tension syndrome     Tendonitis        Past Surgical History:   Procedure Laterality Date    COLONOSCOPY  2016    COLONOSCOPY  11/04/2023    EMBOLIZATION ANEURYSM N/A 04/17/2024    IR CEREBRAL ANGIOGRAPHY  01/08/2024    IR CEREBRAL ANGIOGRAPHY / INTERVENTION  4/17/2024    MAMMO (HISTORICAL) Bilateral 11/03/2020    WISDOM TOOTH EXTRACTION         Family History   Problem Relation Age of Onset    Colon cancer Mother 72    Diabetes Mother     Hypertension Mother     Heart disease Father     Hyperlipidemia Father     Heart attack Father     Heart failure Father     Alzheimer's disease Maternal Aunt     No Known Problems Son     No Known Problems Sister     Breast cancer Maternal Grandmother         50's ?    No Known Problems Maternal Grandfather     Brain cancer Paternal Grandmother         young age    No Known Problems Paternal Grandfather     No Known Problems Paternal Uncle      I have reviewed and agree with the history as documented.    E-Cigarette/Vaping    E-Cigarette Use Never User      E-Cigarette/Vaping Substances    Nicotine No     THC No     CBD No     Flavoring No     Other No     Unknown No      Social History     Tobacco Use    Smoking status: Never    Smokeless tobacco: Never   Vaping Use    Vaping status: Never Used   Substance Use Topics    Alcohol use: Yes     Comment: social     Drug use: Never        Review of Systems   Unable to perform ROS: Patient unresponsive       Physical Exam  ED Triage Vitals   Temperature Pulse Respirations Blood Pressure SpO2   05/10/24 0755 05/10/24 0728 05/10/24 0755 05/10/24 0728 05/10/24 0728   (!) 94.6 °F (34.8 °C) 57 16 160/90 97 %      Temp Source Heart Rate Source Patient Position - Orthostatic VS BP Location FiO2 (%)   05/10/24 0755 -- 05/10/24 0755 05/10/24 0755 --   Rectal  Lying Right arm       Pain  Score       --                    Orthostatic Vital Signs  Vitals:    05/10/24 0746 05/10/24 0750 05/10/24 0755 05/10/24 0800   BP: 159/90 135/85 142/88 150/76   Pulse:  (!) 39 55 55   Patient Position - Orthostatic VS:   Lying        Physical Exam  Vitals and nursing note reviewed.   Constitutional:       General: She is in acute distress.      Appearance: She is ill-appearing and diaphoretic.   HENT:      Head: Normocephalic and atraumatic.      Mouth/Throat:      Mouth: Mucous membranes are moist.      Pharynx: Oropharynx is clear.   Eyes:      General: No scleral icterus.     Conjunctiva/sclera: Conjunctivae normal.   Cardiovascular:      Rate and Rhythm: Normal rate and regular rhythm.      Heart sounds: Normal heart sounds.   Pulmonary:      Effort: Pulmonary effort is normal. No respiratory distress.      Breath sounds: Normal breath sounds.   Abdominal:      General: Abdomen is flat. There is no distension.      Palpations: Abdomen is soft.      Tenderness: There is no abdominal tenderness.   Musculoskeletal:         General: No tenderness or signs of injury.      Cervical back: Neck supple. No rigidity.   Skin:     General: Skin is warm.      Coloration: Skin is not jaundiced.      Findings: No erythema or rash.   Neurological:      General: No focal deficit present.      Mental Status: She is unresponsive.      GCS: GCS eye subscore is 1. GCS verbal subscore is 1. GCS motor subscore is 3.      Comments: Patient unresponsive on arrival, exhibiting decorticate posturing         ED Medications  Medications   niCARdipine (CARDENE) 25 mg (STANDARD CONCENTRATION) in sodium chloride 0.9% 250 mL (3.5 mg/hr Intravenous Rate/Dose Change 5/10/24 0745)   etomidate (AMIDATE) 2 mg/mL injection (20 mg Intravenous Given 5/10/24 0724)   Succinylcholine Chloride 100 mg/5 mL syringe (100 mg Intravenous Given 5/10/24 0724)   propofol (DIPRIVAN) 1000 mg in 100 mL infusion (premix) (10 mcg/kg/min × 87 kg Intravenous New Bag  5/10/24 0742)   desmopressin (DDAVP) 35.2 mcg in sodium chloride 0.9 % 50 mL IVPB (35.2 mcg Intravenous New Bag 5/10/24 0804)   iohexol (OMNIPAQUE) 350 MG/ML injection (MULTI-DOSE) 85 mL (85 mL Intravenous Given 5/10/24 0718)   fentaNYL injection 50 mcg (50 mcg Intravenous Given 5/10/24 0802)       Diagnostic Studies  Results Reviewed       Procedure Component Value Units Date/Time    HS Troponin I 4hr [[id number]]     Lab Status: No result Specimen: Blood     HS Troponin I 2hr [[id number]]     Lab Status: No result Specimen: Blood     HS Troponin 0hr (reflex protocol) [[id number]]  (Normal) Collected: 05/10/24 0742    Lab Status: Final result Specimen: Blood from Arm, Right Updated: 05/10/24 0815     hs TnI 0hr <2 ng/L     Basic metabolic panel [[id number]]  (Abnormal) Collected: 05/10/24 0742    Lab Status: Final result Specimen: Blood from Arm, Right Updated: 05/10/24 0807     Sodium 135 mmol/L      Potassium 3.9 mmol/L      Chloride 105 mmol/L      CO2 20 mmol/L      ANION GAP 10 mmol/L      BUN 13 mg/dL      Creatinine 0.85 mg/dL      Glucose 236 mg/dL      Calcium 8.5 mg/dL      eGFR 77 ml/min/1.73sq m     Narrative:      National Kid[employer] Foundation guidelines for Chronic Kidney Disease (CKD):     Stage 1 with normal or high GFR (GFR > 90 mL/min/1.73 square meters)    Stage 2 Mild CKD (GFR = 60-89 mL/min/1.73 square meters)    Stage 3A Moderate CKD (GFR = 45-59 mL/min/1.73 square meters)    Stage 3B Moderate CKD (GFR = 30-44 mL/min/1.73 square meters)    Stage 4 Severe CKD (GFR = 15-29 mL/min/1.73 square meters)    Stage 5 End Stage CKD (GFR <15 mL/min/1.73 square meters)  Note: GFR calculation is accurate only with a steady state creatinine    Blood gas, venous [[id number]]  (Abnormal) Collected: 05/10/24 0742    Lab Status: Final result Specimen: Blood from Arm, Right Updated: 05/10/24 0802     pH, Christian 7.263     pCO2, Christian 48.7 mm Hg      pO2, Christian 43.3 mm Hg      HCO3, Christian 21.5 mmol/L      Base Excess, Christian  -5.7 mmol/L      O2 Content, Christian 14.6 ml/dL      O2 HGB, VENOUS 73.0 %     CBC and Platelet [[id number]]  (Abnormal) Collected: 05/10/24 0742    Lab Status: Final result Specimen: Blood from Arm, Right Updated: 05/10/24 0751     WBC 10.91 Thousand/uL      RBC 4.59 Million/uL      Hemoglobin 12.3 g/dL      Hematocrit 41.0 %      MCV 89 fL      MCH 26.8 pg      MCHC 30.0 g/dL      RDW 14.7 %      Platelets 209 Thousands/uL      MPV 11.9 fL     Protime-INR [[id number]] Collected: 05/10/24 0742    Lab Status: In process Specimen: Blood from Arm, Right Updated: 05/10/24 0747    APTT [[id number]] Collected: 05/10/24 0742    Lab Status: In process Specimen: Blood from Arm, Right Updated: 05/10/24 0747    FLU/RSV/COVID - if FLU/RSV clinically relevant [[id number]] Collected: 05/10/24 0742    Lab Status: In process Specimen: Nares from Nose Updated: 05/10/24 0746    Fingerstick Glucose (POCT) [[id number]]  (Abnormal) Collected: 05/10/24 0723    Lab Status: Final result Specimen: Blood Updated: 05/10/24 0723     POC Glucose 193 mg/dl                    XR chest 1 view portable   ED Interpretation by Pranav Saucedo DO (05/10 0812)   ET tube above the anastasiya      CTA stroke alert (head/neck)   Final Result by Paxton Ramon MD (05/10 0811)      CTA head:   -New large right parietotemporal intraparenchymal hemorrhage with no suspicious underlying vascular malformation. Paucity of vascularity within the right posterior MCA territory related to the hemorrhage.   -Negative for large vessel intracranial occlusion or hemodynamically significant stenosis.   -ACOM Web embolization device with mild enhancement surrounding the intra saccular device similar to the recent MRA examination.   -Focal fenestration along the right PCA P1 segment.      CTA neck:  No extracranial carotid stenosis.  The cervical vertebral arteries are patent.         I personally discussed this study with ANDREA STUBBS on 5/10/2024 7:44 AM.                            Workstation performed: [id number]         CT stroke alert brain   Final Result by Paxton Ramon MD (05/10 0745)      A NEW large, heterogeneous intraparenchymal hemorrhage is noted within the right parietal -temporal region compared to the recent MRA study of 5/2/2024 measuring 7.2 x 4.0 x 6.2 cm (AP x ML x CC) (volume = 89.3 cc). There is associated vasogenic edema    with 1.2 cm leftward midline shift and partial effacement of the suprasellar cistern.         I personally discussed this study with ANDREA MADERA on 5/10/2024 7:44 AM.            Workstation performed: [id number]               Procedures  ECG 12 Lead Documentation Only    Date/Time: 5/10/2024 7:51 AM    Performed by: Pranav Saucedo DO  Authorized by: Pranav Saucedo DO    Indications / Diagnosis:  Stroke  ECG reviewed by me, the ED Provider: yes    Patient location:  ED  Previous ECG:     Previous ECG:  Compared to current    Comparison ECG info:  Atrial fibrillation    Similarity:  Changes noted  Interpretation:     Interpretation: abnormal    Rate:     ECG rate:  89    ECG rate assessment: normal    Rhythm:     Rhythm: atrial fibrillation    Ectopy:     Ectopy: none    QRS:     QRS axis:  Normal    QRS intervals:  Normal  Conduction:     Conduction: normal    ST segments:     ST segments:  Normal  T waves:     T waves: normal    Intubation    Date/Time: 5/10/2024 8:24 AM    Performed by: Pranav Saucedo DO  Authorized by: Pranav Saucedo DO    Patient location:  ED  Other Assisting Provider: Yes (comment) (Dr. Madera)    Consent:     Consent obtained:  Emergent situation  Universal protocol:     Relevant documents present and verified: yes      Test results available and properly labeled: yes      Radiology Images displayed and confirmed.  If images not available, report reviewed: yes      Required blood products, implants, devices, and special equipment available: yes       Site/side marked: yes      Immediately prior to procedure, a time out was called: yes      Patient identity confirmed:  Arm band and hospital-assigned identification number  Pre-procedure details:     Patient status:  Unresponsive    Pretreatment medications:  Etomidate    Paralytics:  Succinylcholine  Indications:     Indications for intubation: airway protection    Procedure details:     Preoxygenation:  Bag valve mask    CPR in progress: no      Intubation method:  Oral    Oral intubation technique:  Glidescope    Laryngoscope size: Hyper angulated 3.    Tube size (mm):  7.5    Tube type:  Cuffed    Number of attempts:  1    Ventilation between attempts: no      Cricoid pressure: no      Tube visualized through cords: yes    Placement assessment:     Tube secured with:  ETT mcknight    Breath sounds:  Equal    Placement verification: chest rise, condensation, colorimetric ETCO2 device, CXR verification, equal breath sounds and tube exhalation      CXR findings:  ETT in proper place  Post-procedure details:     Patient tolerance of procedure:  Tolerated well, no immediate complications        ED Course                                       Medical Decision Making  Aleah is a 53 year old female with history of anterior communicating artery aneurysm s/p embolization 3 weeks ago, presenting via EMS with complaint of headache and left-sided weakness.  Per , her last known normal was 10 PM last evening before they went to bed, when they woke she was complaining of a severe headache, she was diaphoretic, had left-sided weakness.  By time she arrived to the emergency department, patient was unresponsive, not opening eyes to stimulation, exhibiting decorticate posturing.  Patient was a prehospital stroke alert, taken immediately to CT scan where she was found to have a large intracerebral hemorrhage.  Neurology resident was at the bedside as she arrived.  He was able to contact the neurocritical care unit at [facility] for transfer of which we confirmed with our transfer team.  Patient accepted by Dr. Turner.  Neurosurgery also consulted, per  they were already contacted by him this morning given her recent aneurysm embolization 3 weeks ago and that she was to follow-up with their team today for a regular office appointment.  Dr. Madrea also contacted their team who provided us with further treatment recommendations.    Patient was taken back to her room in the emergency department, the decision was made to intubate her for airway protection.  Patient was intubated on the first attempt without any complication, chest x-ray appeared to show good positioning of the ET tube.  Per recommendations from neurology, neurocritical care, patient was started on a nicardipine infusion with the goal to have systolic blood pressure less than 140.  Shortly after starting this infusion, her transport team arrived to take her to [facility] where she can get definitive care.  She was going to be started on hypertonic saline bolus to help decrease ICP, as she was exhibiting reflexive bradycardia throughout her stay with us in the emergency department.    Just prior to her being transported, I was able to speak with her  face-to-face and inform him of the situation.  He is going to follow at [facility].    Problems Addressed:  Acute respiratory failure (HCC): acute illness or injury  Bradycardia: acute illness or injury  Encephalopathy: acute illness or injury  Hypothermia, initial encounter: acute illness or injury  Intracerebral hemorrhage: acute illness or injury    Amount and/or Complexity of Data Reviewed  Labs: ordered.  Radiology: ordered and independent interpretation performed.    Risk  Prescription drug management.          Disposition  Final diagnoses:   Intracerebral hemorrhage   Hypothermia, initial encounter   Acute respiratory failure (HCC)   Encephalopathy   Bradycardia     Time  reflects when diagnosis was documented in both MDM as applicable and the Disposition within this note       Time User Action Codes Description Comment    5/10/2024  7:05 AM Manuela Dong Add [I63.9] Stroke (HCC)     5/10/2024  7:55 AM Pranav Saucedo Add [I61.9] Intracerebral hemorrhage (HCC)     5/10/2024  7:56 AM Pranav Saucedo Modify [I63.9] Stroke (HCC)     5/10/2024  7:56 AM Pranav Saucedo Modify [I61.9] Intracerebral hemorrhage (HCC)     5/10/2024  7:56 AM Pranav Saucedo Modify [I63.9] Intracerebral hemorrhage     5/10/2024  7:56 AM Pranav Saucdeo Modify [I63.9] Intracerebral hemorrhage     5/10/2024  7:56 AM Pranav Saucedo Modify [I61.9] Intracerebral hemorrhage (HCC)     5/10/2024  8:00 AM Pranav Saucedo Remove [I61.9] Intracerebral hemorrhage (HCC)     5/10/2024  8:17 AM Pranav Saucedo Add [T68.XXXA] Hypothermia, initial encounter     5/10/2024  8:17 AM Pranav Saucedo Add [J96.00] Acute respiratory failure (HCC)     5/10/2024  8:17 AM Pranav Saucedo Add [G93.40] Encephalopathy     5/10/2024  8:19 AM Pranav Saucedo Add [R00.1] Bradycardia           ED Disposition       ED Disposition   Transfer to Another Facility-In Network    Condition   --    Date/Time   Fri May 10, 2024  7:56 AM    Comment   Aleah Newton should be transferred out to [facility].               MD Documentation      Flowsheet Row Most Recent Value   Patient Condition The patient has been stabilized such that within reasonable medical probability, no material deterioration of the patient condition or the condition of the unborn child(keara) is likely to result from the transfer   Reason for Transfer Level of Care needed not available at this facility   Benefits of Transfer Specialized equipment and/or services available at the receiving facility (Include comment)________________________  [Neuro ICU, neurosurgery]   Risks of Transfer Potential for delay in receiving treatment, Potential  deterioration of medical condition, Loss of IV, Increased discomfort during transfer, Possible worsening of condition or death during transfer   Accepting Physician Dr. Turner   Accepting Facility Name, [facility] [place], [place]   Sending MD Dr. Saucedo   Provider Certification General risk, such as traffic hazards, adverse weather conditions, rough terrain or turbulence, possible failure of equipment (including vehicle or aircraft), or consequences of actions of persons outside the control of the transport personnel, Unanticipated needs of medical equipment and personnel during transport, Risk of worsening condition, The possibility of a transport vehicle being unavailable          RN Documentation      Flowsheet Row Most Recent Value   Accepting Facility Name, [facility] [place], [place]          Follow-up Information    None         Patient's Medications   Discharge Prescriptions    No medications on file     No discharge procedures on file.    PDMP Review       None             ED Provider  Attending physically available and evaluated Aleah Newton. I managed the patient along with the ED Attending.    Electronically Signed by           Pranav Saucedo DO  05/10/24 0826       Pranav Saucedo DO  05/10/24 0832

## 2024-05-10 NOTE — PROCEDURES
Central Line Insertion    Date/Time: 5/10/2024 3:47 PM    Performed by: Jarad Engel DO  Authorized by: Jarad Engel DO    Patient location:  ICU  Other Assisting Provider: Yes (comment) (Maddie Turner)    Consent:     Consent obtained:  Written    Consent given by:  Spouse    Risks discussed:  Arterial puncture, incorrect placement, nerve damage, bleeding, infection and pneumothorax    Alternatives discussed:  No treatment  Universal protocol:     Relevant documents present and verified: yes      Immediately prior to procedure, a time out was called: yes      Patient identity confirmed:  Arm band  Pre-procedure details:     Hand hygiene: Hand hygiene performed prior to insertion      Sterile barrier technique: All elements of maximal sterile technique followed      Skin preparation:  ChloraPrep    Skin preparation agent: Skin preparation agent completely dried prior to procedure    Indications:     Central line indications: medications requiring central line and hemodynamic monitoring      Site selection rationale:  Elevated intracranial pressure  Anesthesia (see MAR for exact dosages):     Anesthesia method:  None  Procedure details:     Location:  Left subclavian    Vessel type: vein      Laterality:  Left    Approach: percutaneous technique used      Patient position:  Reverse Trendelenburg    Catheter type:  Triple lumen 20cm    Catheter size:  7 Fr    Landmarks identified: yes      Ultrasound guidance: no      Manometry confirmation: no      Number of attempts:  3    Successful placement: yes      Catheter tip vessel location: inferior vena cava    Post-procedure details:     Post-procedure:  Dressing applied and line sutured    Assessment:  Blood return through all ports, no pneumothorax on x-ray and placement verified by x-ray    Post-procedure complications: none      Patient tolerance of procedure:  Tolerated well, no immediate complications

## 2024-05-10 NOTE — RESPIRATORY THERAPY NOTE
RT Protocol Note  Aleah Newton 53 y.o. female MRN: [id number]  Unit/Bed#: ICU 09 Encounter: [id number]    Assessment    Active Problems:    Anterior communicating artery aneurysm    Right-sided nontraumatic intracerebral hemorrhage (HCC)      Home Pulmonary Medications:  none       Past Medical History:   Diagnosis Date    Abnormal Pap smear of cervix     Anemia     Arthritis     Asthma     Colon polyp     History of vertigo     Hyperlipemia     triglycerides    Hypertriglyceridemia     Hypothyroidism     Impaired fasting glucose     Insomnia     Lab test positive for detection of COVID-19 virus 2021    Premenstrual tension syndrome     Tendonitis      Social History     Socioeconomic History    Marital status: /Civil Union     Spouse name: Not on file    Number of children: Not on file    Years of education: 4 year college    Highest education level: Not on file   Occupational History    Occupation:    Tobacco Use    Smoking status: Never    Smokeless tobacco: Never   Vaping Use    Vaping status: Never Used   Substance and Sexual Activity    Alcohol use: Yes     Comment: social     Drug use: Never    Sexual activity: Yes     Partners: Male     Birth control/protection: Male Sterilization     Comment: Partner vasectomy   Other Topics Concern    Not on file   Social History Narrative    · Most recent tobacco use screenin2018      · Do you currently or have you served in the [employer]:   No      · Were you activated, into active duty, as a member of the [employer] or as a Reservist:   No      · General stress level:   High      · Exercise level:   Moderate      · Diet:   Regular      · Marital status:     since      · Sexual orientation:   Heterosexual      · Alcohol intake:   Occasional      · Live alone or with others:   with others      · Caffeine intake:   None      · Do you feel safe at home:   Yes      Social Determinants of Health     Financial Resource  Strain: Low Risk  (8/8/2023)    Overall Financial Resource Strain (CARDIA)     Difficulty of Paying Living Expenses: Not hard at all   Food Insecurity: No Food Insecurity (8/8/2023)    Hunger Vital Sign     Worried About Running Out of Food in the Last Year: Never true     Ran Out of Food in the Last Year: Never true   Transportation Needs: No Transportation Needs (8/8/2023)    PRAPARE - Transportation     Lack of Transportation (Medical): No     Lack of Transportation (Non-Medical): No   Physical Activity: Sufficiently Active (8/8/2023)    Exercise Vital Sign     Days of Exercise per Week: 5 days     Minutes of Exercise per Session: 30 min   Stress: No Stress Concern Present (8/8/2023)    [employer] - Occupational Stress Questionnaire     Feeling of Stress : Not at all   Social Connections: Not on file   Intimate Partner Violence: Not At Risk (8/8/2023)    Humiliation, Afraid, Rape, and Kick questionnaire     Fear of Current or Ex-Partner: No     Emotionally Abused: No     Physically Abused: No     Sexually Abused: No   Housing Stability: Low Risk  (8/8/2023)    Housing Stability Vital Sign     Unable to Pay for Housing in the Last Year: No     Number of Places Lived in the Last Year: 1     Unstable Housing in the Last Year: No       Subjective         Objective    Physical Exam:   Assessment Type: (P) Assess only  General Appearance: (P) Sedated  Respiratory Pattern: (P) Normal  Chest Assessment: (P) Chest expansion symmetrical  Bilateral Breath Sounds: (P) Clear  O2 Device: C3    Vitals:  Pulse (!) 48, temperature (!) 94 °F (34.4 °C), temperature source Rectal, resp. rate 18, last menstrual period 09/12/2022, SpO2 95%.          Imaging and other studies: I have personally reviewed pertinent reports.      O2 Device: C3     Plan             Resp Comments: (P) Pt received from OR and placed on vent with documented cmv settings. Pt ordered on RCP, for vent managment. Pt has no pulm hx.

## 2024-05-10 NOTE — PROGRESS NOTES
I have personally seen and examined patient and reviewed all data with resident. Agree with note, assessment and plan. Critical care time 79 minutes. Please refer to attending comments below. Critical care time does not include procedures, family meeting or teaching.           Patient has a known history of ACOM aneurysm with web embolization on dual antiplatelet therapy.  She presented to [facility] with reported left-sided weakness at home.  On presentation patient was noted to be posturing.  She required emergent intubation.  CT scan head was obtained which showed a right intraparenchymal hematoma with evidence of mass effect and brain compression.  Patient was administered DDAVP and emergently transferred to [facility] for neurosurgical intervention.  Patient was administered 250 mL of 3% saline en route by EMS as per critical care direction.  Patient was evaluated on arrival in CT scan and noted to have pupils 6 mm.  She was emergently taken to the operating room with neurosurgery.  6 units of platelets were ordered, infusion initiated in CT scan.  Additional 23% saline bolus administered 30 mL push.  Ventilator was adjusted to maintain hyperventilation.  Patient was transition to OR staff and neurosurgery for ongoing management.    Visit Vitals  Pulse (!) 48   Temp (!) 94 °F (34.4 °C) (Rectal)   Resp 18   LMP 09/12/2022 Comment: no period for 18 months   SpO2 95%   OB Status Postmenopausal   Smoking Status Never     GEN: Unresponsive on ventilator   HEENT: Pupils are 6 mm bilaterally and nonreactive, endotracheal tube in place, craniectomy flap tense and full  CV: Bradycardia, no   murmur  Resp:  CTA, no R/R/W  GI: soft,NT/ND  : urinary catheter in place  Neuro: Pupils unreactive to light, no corneal reflexes, no gag or cough appreciated, no withdrawal to pain on any extremity  Skin: warm, dry    Right frontal temporal ICH with significant brain compression and effacement of basilar  cisterns with mass effect left to right shift-initial NIH 27, ICH 3  Acute hypoxic respiratory failure  Hypertensive emergency  Platelet dysfunction secondary to dual antiplatelet therapy  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  Bradycardia  Hypothermia postop  Hypernatremia-secondary to hypertonic saline  Metabolic acidosis-nongap secondary to hypertonic saline  Hypomagnesemia-replete and recheck  Hypokalemia-replete and recheck    Patient arrived to ICU postoperatively with temperature of 94 degrees and bradycardia.  Patient also was on propofol at 50 mcg infusion.  She had been on norepinephrine at 7 mcg/min and it was subsequently weaned off.  On evaluation on her presentation her mean arterial blood pressures were approximately 60 with a systolic of 100.  Norepinephrine infusion restarted to maintain mean arterial pressure of 75.    On neurologic exam patient did not respond to noxious stimuli pupils were not reactive.  No cough or gag was appreciated.  Unfortunately poor neurologic prognosis.  Patient did have BMP performed intraoperatively with a sodium of 137.  An additional 30 mL of 23% saline was administered IV slow push over 10 minutes in the ICU.  Plan to maintain serum osmolarity 310-320 and serum sodium 145-155.  Patient's craniectomy flap is tense and full.  Optic nerve sheath diameter is both greater than 5 mm concerning for persistent increased ICP.    Continue to monitor patient's neurologic exam off sedation.    Maintain on ventilatory support, patient is not appropriate for spontaneous breathing trial.  Aggressive airway clearance protocol.    Maintain off all anticoagulation and antiplatelet medications at this time.    Continue to monitor on telemetry.  Check point-of-care ultrasound cardiac as patient is requiring vasopressors.  There is a concern that she may have progressed or is progressing to brain death which would likely impact her hemodynamic status as well.  Plan to  reassess neurologic exam and possible examination for brain death once patient has been off sedation and has returned to normothermia.  Likely plan on assessment for brain death evaluation tomorrow.      Update 4:06 PM    Patient sodium is 155 this is after multiple boluses of hypertonic saline.  Continue with goal serum sodium 145-155.  Patient's magnesium and potassium will both be repleted.  Patient serum osmolarity 329.  Goal serum osmolarity 310-320.  Continue to monitor.  Hold on hypertonic saline infusion and recheck laboratory later to assess for need for ongoing infusion.    Patient's , sister, son and niece were at the bedside.  An update was provided to them by neurosurgery of patient's poor neurologic prognosis.  Family was addressed concerning patient's neurologic exam in the ICU and informed by me in the critical care team that there is concern patient may progress to brain death but working to continue with supportive care at this time and reassess in the a.m.      Intracerebral Hemorrhage (ICH) Score:    Merrill Coma Sore 3-4 equals +2   Age greater than or equal to 80 No   ICH Volume greater than or equal to 30 ml Yes (1 Point)   Intraventricular Hemorrhage No   Infratentorial Origin of Hemorrhage No   Total: 3       Critical care time does not include procedures or family update.  Critical care time 79 minutes.  Patient was initially seen on arrival at 8:30 AM with acute interventions performed.  She was reassessed postoperatively as well as multiple times before transition change of shift.

## 2024-05-10 NOTE — H&P
[facility]  H&P: Critical Care  Name: Aleah Newton 53 y.o. female I MRN: [id number]  Unit/Bed#: ICU 09 I Date of Admission: 5/10/2024   Date of Service: 5/10/2024 I Hospital Day: 0      Assessment/Plan   Neuro:   Diagnosis: Right sided frontotemporal lobe intraparenchymal hemorrhage w/ vasogenic edema and mass effect s/p right hemispheric craniectomy and hematoma evacuation  Plan:   5/10 CT: acute large heterogeneous parenchymal hemorrhage in the R parietal region. Associated edema with 1.2cm midline shift and effacement on the suprasellar cistern.  5/10 CTA: CTA demonstrated negative for any acute vascular etiology assocated with R parietal parenchymal hemorrhage. Stable appearance of acom web embolization.  5/10: Repeat CTH pre-op: Similar size of right frontotemporal hemorrhage. Stable leftward midline shift. Interval increased effacement of the basilar cisterns. Dilated temporal horn of the left lateral ventricle suggest developing hydrocephalus. Possible small amount of subarachnoid hemorrhage.  5/10: Emergent right hemispheric craniectomy and hematoma evacuation  5/10: CTH post op: Brain has herniated outside of the craniectomy margin, scattered intraparenchymal H and SAH, w/ findings suggestive of cytotoxic edema potentially representing anoxia/infarct. Additional findings compatible with downward transtentorial herniation. Hematoma decreased in size and decrease in midline shift, currently 7 mm, previously 1.4 cm.  5/10 - L subclavian CVC placed, confirmed w/ CXR  Intra Op: Received x6 u platelets, 1 u PRBCs  On arrival: received x2-23.4%-30 cc bullets of hypertonic saline  5/10 POC ocular US demonstrated evidence of bilateral retinal sheath swelling consistent w/ ICP of at least 20  BP Goal, MAP > 75, Sys < 160  Q1 neuro checks, 24 hour repeat neuro exam prior to brain death confirmation  Seizure Ppx - Keppra 500 BID  ICP management:   Goal of Na > 145-155, 30cc/hr  NS  Q6/h BMP repeats  Maintain CCP > 65  Temperature control - Rishi martini, terminate at temp of 98.5, maintain normothermia  Tylenol as needed q8/hr for fever prevention  Pain management: Dilaudid, Oxycodone - held  Family made aware of poor prognosis, gift of life contacted  Neurosurgery and Neurology following, recs appreciated    CV:   Diagnosis: Distributive Shock  Plan:  Lactic acid+, on arrival BP 70s/50s  Secondary to brainstem herniation, bedside US demonstrates hyperdynamic heart tones, volume down, given x2 bolus of 1 L NS  Treating w/ combination Levo, Vasopressin, Stress dose steroids (solucortef 50mg q6h)  Volume resuscitation - maintenance fluids @ 75cc/hr    Pulm:  Diagnosis: Respiratory insufficiency  Plan:  Vent - VC, rr 14, , Peep 6, FiO2 60%  pH improving  Pt intubated for protection of airway  Propofol held  Respiratory protocol / Oral hygiene / peridex    GI:   Diagnosis: Bowel Regimen   Plan:   Place OG tube for stomach decompression  NPO at this time  Senna, colace, miralax - on hold     :   Diagnosis: Hypernatremia   Plan:   Expected in setting of hypertonic saline administration  Oliva in place, monitor IO's for evidence of DI (>250cc/hr urine output x2)    F/E/N:    F: NS maintenance - 75 cc/hr  E: Correct as needed - given Mg and Ca   N: NPO    Heme/Onc:   Diagnosis: DVT PPx  Plan: Held in setting of acute hemorrhage    Diagnosis: Anemia, acute blood loss  Plan:  CBC: 8.6, post op w/ x1 unit PRBCs given, from 12.3 on presentation  Trend CBC, monitor craniectomy drain and craniectomy flap  Transfuse as needed for Hgb < 7      Diagnosis: Lactic Acidosis  Plan:  Initial value: 4.5, repeat 3.3  Secondary to distributive shock in setting of complete brainstem herniation  Continue w/ treatment of distributive shock + brainstem herniation  Fluid resuscitation  Labs for evidence of organ dysfunction    Endo:   No active issues,     ID:   Diagnosis: Surgical Ppx S/P R  craniecotomy  Plan:  2g Ancef IV Q8 for x3 doses    Diagnosis: Leukocytosis  Plan:  Reactive, trend WBCs, monitor fever curve    MSK/Skin:   Diagnosis: Pressure ulcer PPx  Plan: regular turning    Disposition: Critical care       History of Present Illness     HPI: Aleah Newton is a 53 y.o. w/ a pmh of an ACOM aneurysm, HTN, asthma, and hypothyroidism who underwent a WEB embolization on 4/17 of her 5mm ACOM aneurysm with Dr. Salter, who presents with intracerebral hemorrhage. During the 4/17 procedure there were no complications. Pt was discharged the next day on ASA and Brilinta. Per the , this morning, patient woke him from sleep at 6 AM, he noted that his wife was laying on the ground, she was stating that she felt sick to her stomach and warm, asked him to get a warm washcloth and turn the fan on, notes that after some time she started to make nonsensical comments, asking where the pocket was and where the washcloth was, additionally developed nausea and vomiting, at this point called EMS/presentation to the [facility] emergency department. Was found to be hypertensive by EMS with a SBP >200mmHg.    At the [facility] emergency department found to have acute left sided weakness and a headache. She was a GCS 6 on arrival with evidence of decorticate posturing. Her SBP was 160. She was intubated and taken to CT scan which revealed a new large IP hemorrhage in the right parietal-temporal region with an estimated volume of 89.3cc and measuring 7.2 x 4.0 x 6.2 cm, ICH score of 3.     CT Imaging at [facility] demonstrated evidence of intraparenchymal hemorrhage. Pt was intubated for airway protection, started on nicardipine and propofol for sedation, pt given DDAVP and then transferred to [facility] direct to CT for urgent evaluation, Neurosurgery was made aware of the pt and present for pt arrival at CT. On arrival repeat CT demonstrated repeat right frontotemporal hemorrhage. Stable leftward  midline shift and increased effacement of the basilar cisterns. Additionally, dilated temporal horn of the left lateral ventricle suggest developing hydrocephalus and possible subarachnoid hemorrhage. Pt was urgently rushed to surgery for decompression.    Pt was then recipient of a decompressive R hemicraniectomy and hematoma evacuation - post procedure pupils remain fixed and dilated, tense crani flap - received, intraoperatively she received 6 u/plt and 1 unit PRBCs in the OR.     From the OR pt was brought to CT for repeat CT head non con, S/P right hemispheric craniectomy.  The brain has herniated outside of the craniectomy margin, demonstrating scattered foci of intraparenchymal and subarachnoid hemorrhage. Diffuse edema in the right hemisphere with loss of gray-white differentiation in the right temporal lobe, right frontal and parietal lobes as well as within the midbrain and ulysses. Findings suggestive of cytotoxic edema potentially representing anoxia/infarct. Loss of CSF cisterns in the posterior fossa with displacement of the cerebellar tonsils compatible with downward transtentorial herniation. Right frontoparietal parenchymal hematoma decreased in size status post evacuation. Sequela of recent surgery noted with gas within the residual hematoma. Interval decrease in midline shift.    Dr. Goldberg met w/ the family following the procedure and began conversation regarding the results of her procedure and outcome. It was explained to the family at this time that based on the post procedure imaging the pt has a very poor prognosis.    Review of Systems   Historical Information   Past Medical History:  No date: Abnormal Pap smear of cervix  No date: Anemia  No date: Arthritis  No date: Asthma  No date: Colon polyp  No date: History of vertigo  No date: Hyperlipemia      Comment:  triglycerides  No date: Hypertriglyceridemia  No date: Hypothyroidism  No date: Impaired fasting glucose  No date:  Insomnia  12/22/2021: Lab test positive for detection of COVID-19 virus  No date: Premenstrual tension syndrome  No date: Tendonitis Past Surgical History:  2016: COLONOSCOPY  11/04/2023: COLONOSCOPY  04/17/2024: EMBOLIZATION ANEURYSM; N/A  01/08/2024: IR CEREBRAL ANGIOGRAPHY  4/17/2024: IR CEREBRAL ANGIOGRAPHY / INTERVENTION  11/03/2020: MAMMO (HISTORICAL); Bilateral  No date: WISDOM TOOTH EXTRACTION   Current Outpatient Medications   Medication Instructions    ALBUTEROL IN Inhalation, As needed    aspirin (ECOTRIN LOW STRENGTH) 81 mg, Oral, Daily    fluocinonide (LIDEX) 0.05 % cream Apply twice a day to the back as needed only when pink and itchy.    hydroquinone 4 % cream Topical, 2 times daily, To the face (dark area) only.    levothyroxine 50 mcg tablet TAKE 1 TABLET BY MOUTH EVERY DAY    loratadine-pseudoephedrine (CLARITIN-D 12-HOUR) 5-120 mg per tablet 1 tablet, Oral, Daily PRN    Multiple Vitamin (MULTI-VITAMIN DAILY PO) Oral, Daily    ticagrelor (BRILINTA) 90 mg, Oral, Every 12 hours scheduled    Zolpidem Tartrate (AMBIEN PO) Oral    Allergies   Allergen Reactions    Niacin Hives     In high doses   (Flushing)    Mucinex Fast-Max Chest Scott Ms [Guaifenesin] Palpitations      Social History     Tobacco Use    Smoking status: Never    Smokeless tobacco: Never   Vaping Use    Vaping status: Never Used   Substance Use Topics    Alcohol use: Yes     Comment: social     Drug use: Never    Family History   Problem Relation Age of Onset    Colon cancer Mother 72    Diabetes Mother     Hypertension Mother     Heart disease Father     Hyperlipidemia Father     Heart attack Father     Heart failure Father     Alzheimer's disease Maternal Aunt     No Known Problems Son     No Known Problems Sister     Breast cancer Maternal Grandmother         50's ?    No Known Problems Maternal Grandfather     Brain cancer Paternal Grandmother         young age    No Known Problems Paternal Grandfather     No Known Problems Paternal  Uncle           Objective                            Vitals I/O      Most Recent Min/Max in 24hrs   Temp 97.9 °F (36.6 °C) Temp  Min: 93.6 °F (34.2 °C)  Max: 99 °F (37.2 °C)   Pulse 90 Pulse  Min: 39  Max: 118   Resp 14 Resp  Min: 14  Max: 18   BP (!) 67/50 BP  Min: 67/50  Max: 183/81   O2 Sat 100 % SpO2  Min: 95 %  Max: 100 %      Intake/Output Summary (Last 24 hours) at 5/10/2024 1913  Last data filed at 5/10/2024 1908  Gross per 24 hour   Intake 6178.3 ml   Output 3855 ml   Net 2323.3 ml       Diet NPO    Invasive Monitoring   Arterial Line  Audrey BP 64/42  Arterial Line BP  Min: 64/42  Max: 172/90   MAP (!) 52 mmHg  Arterial Line MAP (mmHg)  Min: 52 mmHg  Max: 120 mmHg           Physical Exam   Physical Exam  Eyes:      Comments: Pupils fixed, dilated bilaterally   Skin:     General: Skin is cool and not mottled extremities.      Coloration: Skin is not pale.   HENT:      Head:      Comments: S/P R scalp hemicraniectomy, skin is tense, no evidence of wound dehiscence, bulb drain in place w/ serosang drainage      Nose: No nasal deformity or epistaxis.   Cardiovascular:      Rate and Rhythm: Bradycardia present.   Musculoskeletal:         General: Swelling present.      Comments: R scalp swelling   Abdominal: General: There is no distension.      Palpations: Abdomen is soft.   Constitutional:       Appearance: She is well-developed and well-nourished. She is ill-appearing. She is not toxic-appearing.      Interventions: She is sedated and intubated.   Pulmonary:      Effort: She is intubated.      Breath sounds: No wheezing, rhonchi or rales.      Comments: Intubated, tolerating vent  Secretions are normal.Neurological:      Mental Status: She is unresponsive.      Comments: Pupils fixed / dilated bilat, no corneal reflex, no gag, no cough, no corneal, no response to pain, flaccid   Genitourinary/Anorectal:  Oliva present.          Diagnostic Studies      EKG: n/a  Imaging:     CT Head non con:  IMPRESSION:  Status post right hemispheric craniectomy.  The brain has herniated outside of the craniectomy margin, demonstrating scattered foci of intraparenchymal and subarachnoid hemorrhage.     Diffuse edema in the right hemisphere with loss of gray-white differentiation in the right temporal lobe, right frontal and parietal lobes as well as within the midbrain and ulysses. Findings suggestive of cytotoxic edema potentially representing   anoxia/infarct. Loss of CSF cisterns in the posterior fossa with displacement of the cerebellar tonsils compatible with downward transtentorial herniation.     Right frontoparietal parenchymal hematoma decreased in size status post evacuation. Sequela of recent surgery noted with gas within the residual hematoma.     Interval decrease in midline shift, currently 7 mm, previously 1.4 cm when remeasured with same technique     I have personally reviewed pertinent reports.       Medications:  Scheduled PRN   acetaminophen, 975 mg, Q8H JOE  calcium gluconate, 2 g, Once  cefazolin, 2,000 mg, Q8H  chlorhexidine, 15 mL, Q12H JOE  docusate sodium, 100 mg, BID  hydrocortisone sodium succinate, 50 mg, Q6H JOE  levETIRAcetam, 500 mg, Q12H JOE  polyethylene glycol, 17 g, Daily  senna, 1 tablet, Daily      bisacodyl, 10 mg, Daily PRN  hydrALAZINE, 10 mg, Q6H PRN  HYDROmorphone, 0.2 mg, Q4H PRN  ondansetron, 4 mg, Q4H PRN  oxyCODONE, 2.5 mg, Q4H PRN   Or  oxyCODONE, 5 mg, Q4H PRN       Continuous    norepinephrine, 1-30 mcg/min, Last Rate: 20 mcg/min (05/10/24 1908)  propofol, 5-50 mcg/kg/min, Last Rate: Stopped (05/10/24 1330)  sodium chloride, 30 mL/hr, Last Rate: 30 mL/hr (05/10/24 1814)  vasopressin, 0.03 Units/min, Last Rate: 0.03 Units/min (05/10/24 1811)         Labs:    CBC    Recent Labs     05/10/24  1025 05/10/24  1208 05/10/24  1413   WBC 12.48*  --  19.37*   HGB 10.4* 6.5* 8.6*   HCT 32.4* 19* 26.6*   *  --  426*     BMP    Recent Labs     05/10/24  1413 05/10/24  2684    SODIUM 155* 148*   K 3.7 4.8   * 117*   CO2 19* 22   AGAP 8 9   BUN 12 10   CREATININE 0.68 0.65   CALCIUM 8.0* 7.5*       Coags    Recent Labs     05/10/24  0742 05/10/24  0923   INR 0.87 1.04   PTT 25 26        Additional Electrolytes  Recent Labs     05/10/24  1208 05/10/24  1413 05/10/24  1713   MG  --  1.6*  --    CAIONIZED 1.04*  --  1.05*          Blood Gas    Recent Labs     05/10/24  1714   PHART 7.367   GAC7HMW 35.1*   PO2ART 246.3*   LCP1VCI 19.7*   BEART -5.1   SOURCE Line, Arterial     Recent Labs     05/10/24  0742 05/10/24  1435 05/10/24  1714   PHVEN 7.263*  --   --    XSI6PBT 48.7  --   --    PO2VEN 43.3  --   --    CLT9OWB 21.5*  --   --    BEVEN -5.7  --   --    [id number] 73.0  --   --    SOURCE  --    < > Line, Arterial    < > = values in this interval not displayed.    LFTs  No recent results    Infectious  No recent results  Glucose  Recent Labs     05/10/24  0742 05/10/24  0923 05/10/24  1413 05/10/24  1713   GLUC 236* 237* 131 134               Jarad Engel, DO

## 2024-05-10 NOTE — ANESTHESIA PROCEDURE NOTES
Arterial Line Insertion    Performed by: Bob Dunbar CRNA  Authorized by: Pranay Ghosh MD  Consent: The procedure was performed in an emergent situation.  Required items: required blood products, implants, devices, and special equipment available  Preparation: Patient was prepped and draped in the usual sterile fashion.  Indications: multiple ABGs and hemodynamic monitoring  Orientation:  Left  Location: radial artery  Procedure Details:  Narendra's test normal: yes  Needle gauge: 20  Seldinger technique: Seldinger technique used  Number of attempts: 1    Post-procedure:  Post-procedure: dressing applied  Waveform: good waveform  Post-procedure CNS: normal  Patient tolerance: patient tolerated the procedure well with no immediate complications

## 2024-05-11 NOTE — RESPIRATORY THERAPY NOTE
RT Ventilator Management Note  Aleah Newton 53 y.o. female MRN: [id number]  Unit/Bed#: ICU 09 Encounter: [id number]      Daily Screen         5/10/2024  1534 5/11/2024  0723          Patient safety screen outcome:: Failed Failed      Not Ready for Weaning due to:: Underline problem not resolved Underline problem not resolved                Physical Exam:   Assessment Type: Assess only  General Appearance: Sedated  Respiratory Pattern: Assisted  Chest Assessment: Chest expansion symmetrical  Bilateral Breath Sounds: Clear      Resp Comments: Pt contiues on current settings

## 2024-05-11 NOTE — DISCHARGE SUMMARY
"Discharge Summary - Aleah Newton 53 y.o. female MRN: [id number]    Unit/Bed#: ICU 09 Encounter: [id number] PCP: Celsa Mayo MD    Admission Date:   Admission Orders (From admission, onward)       Ordered        05/10/24 0830  Inpatient Admission  Once                            Admitting Diagnosis: Stroke (HCC) [I63.9]    HPI: H&P per Dr. Engel, \"53 y.o. w/ a pmh of an ACOM aneurysm, HTN, asthma, and hypothyroidism who underwent a WEB embolization on 4/17 of her 5mm ACOM aneurysm with Dr. Salter, who presents with intracerebral hemorrhage. During the 4/17 procedure there were no complications. Pt was discharged the next day on ASA and Brilinta. Per the , this morning, patient woke him from sleep at 6 AM, he noted that his wife was laying on the ground, she was stating that she felt sick to her stomach and warm, asked him to get a warm washcloth and turn the fan on, notes that after some time she started to make nonsensical comments, asking where the pocket was and where the washcloth was, additionally developed nausea and vomiting, at this point called EMS/presentation to the [facility] emergency department. Was found to be hypertensive by EMS with a SBP >200mmHg.     At the [facility] emergency department found to have acute left sided weakness and a headache. She was a GCS 6 on arrival with evidence of decorticate posturing. Her SBP was 160. She was intubated and taken to CT scan which revealed a new large IP hemorrhage in the right parietal-temporal region with an estimated volume of 89.3cc and measuring 7.2 x 4.0 x 6.2 cm, ICH score of 3.      CT Imaging at [place] demonstrated evidence of intraparenchymal hemorrhage. Pt was intubated for airway protection, started on nicardipine and propofol for sedation, pt given DDAVP and then transferred to [facility] direct to CT for urgent evaluation, Neurosurgery was made aware of the pt and present for pt arrival at CT. On arrival repeat CT " "demonstrated repeat right frontotemporal hemorrhage. Stable leftward midline shift and increased effacement of the basilar cisterns. Additionally, dilated temporal horn of the left lateral ventricle suggest developing hydrocephalus and possible subarachnoid hemorrhage. Pt was urgently rushed to surgery for decompression.     Pt was then recipient of a decompressive R hemicraniectomy and hematoma evacuation - post procedure pupils remain fixed and dilated, tense crani flap - received, intraoperatively she received 6 u/plt and 1 unit PRBCs in the OR.      From the OR pt was brought to CT for repeat CT head non con, S/P right hemispheric craniectomy.  The brain has herniated outside of the craniectomy margin, demonstrating scattered foci of intraparenchymal and subarachnoid hemorrhage. Diffuse edema in the right hemisphere with loss of gray-white differentiation in the right temporal lobe, right frontal and parietal lobes as well as within the midbrain and ulysses. Findings suggestive of cytotoxic edema potentially representing anoxia/infarct. Loss of CSF cisterns in the posterior fossa with displacement of the cerebellar tonsils compatible with downward transtentorial herniation. Right frontoparietal parenchymal hematoma decreased in size status post evacuation. Sequela of recent surgery noted with gas within the residual hematoma. Interval decrease in midline shift.     Dr. Goldberg met w/ the family following the procedure and began conversation regarding the results of her procedure and outcome. It was explained to the family at this time that based on the post procedure imaging the pt has a very poor prognosis.\"    Procedures Performed:   Orders Placed This Encounter   Procedures    Central Line    POC Ocular US       Summary of Hospital Course: Admitted with large intraparenchymal hemorrhage, intubated for airway protection, taken for right hemicraniectomy with neurosurgery.  However patient progressed to brain " death, brain death exam performed by Dr. Turner, family aware.  Gift of life following.     Significant Findings, Care, Treatment and Services Provided: as above     Complications: as above     Disposition:      Final Diagnosis: ICH    Medical Problems       Resolved Problems  Date Reviewed: 2024   None         Condition at Time of Death: brain death     Date, Time and Cause of Death    Date of Death: 24  Time of Death:  4:57 PM  Preliminary Cause of Death: ICH (intracerebral hemorrhage) (HCC)  Entered by: Dr. Turner[LB1.1]       Attribution       LB1.1 JOSE Abbott 24 18:54            Death Note:    INPATIENT DEATH NOTE  Aleah Newton 53 y.o. female MRN: [id number]  Unit/Bed#: ICU 09 Encounter: [id number]    Date, Time and Cause of Death    Date of Death: 24  Time of Death:  4:57 PM  Preliminary Cause of Death: ICH (intracerebral hemorrhage) (HCC)  Entered by: Dr. Turner[LB1.1]       Attribution       LB1.1 JOSE Abbott 24 18:54             Patient's Information  Pronounced by: Dr. Turner  Did the patient's death occur in the ED?: No  Did the patient's death occur in the OR?: No  Did the patient's death occur less than 10 days post-op?: Yes  Did the patient's death occur within 24 hours of admission?: No  Was code status DNR at the time of death?: No    PHYSICAL EXAM:  Brain death exam      Medical Examiner's office notified?:  Yes   Medical Examiner accepted case?:  No      Family Notification  Was the family notified?: Yes  Date Notified: 24  Time Notified:   Notified by: Dr. Turner  Name of Family Notified of Death: Kermit   Relationship to Patient: Spouse  Family Notification Route: At bedside  Name of  Home:: Girl of life    Autopsy Options:  GOL    Primary Service Attending Physician notified?:  yes - Attending:  Maddie Turner, DO    Physician/Resident responsible for completing Discharge  Summary:  Annabel Russ

## 2024-05-11 NOTE — DEATH NOTE
INPATIENT DEATH NOTE  Aleah Newton 53 y.o. female MRN: [id number]  Unit/Bed#: ICU 09 Encounter: [id number]    Date, Time and Cause of Death    Date of Death: 24  Time of Death:  4:57 PM  Preliminary Cause of Death: ICH (intracerebral hemorrhage) (HCC)  Entered by: Dr. Turner[LB1.1]       Attribution       LB1.1 JOSE Abbott 24 18:54             Patient's Information  Pronounced by: Dr. Turner  Did the patient's death occur in the ED?: No  Did the patient's death occur in the OR?: No  Did the patient's death occur less than 10 days post-op?: Yes  Did the patient's death occur within 24 hours of admission?: No  Was code status DNR at the time of death?: No    PHYSICAL EXAM:  Brain death exam      Medical Examiner's office notified?:  Yes   Medical Examiner accepted case?:  No      Family Notification  Was the family notified?: Yes  Date Notified: 24  Time Notified:   Notified by: Dr. Turner  Name of Family Notified of Death: Kermit   Relationship to Patient: Spouse  Family Notification Route: At bedside  Name of  Home:: Girl of life    Autopsy Options:  GOL    Primary Service Attending Physician notified?:  yes - Attending:  Maddie Turner, DO    Physician/Resident responsible for completing Discharge Summary:  Annabel Russ

## 2024-05-11 NOTE — CASE MANAGEMENT
Case Management Assessment & Discharge Planning Note    Patient name Aleah Newton  Location ICU /ICU 09 MRN [id number]  : 1970 Date 2024       Current Admission Date: 5/10/2024  Current Admission Diagnosis:Right-sided nontraumatic intracerebral hemorrhage (HCC)   Patient Active Problem List    Diagnosis Date Noted    Right-sided nontraumatic intracerebral hemorrhage (HCC) 05/10/2024    Adjustment insomnia 2024    Stress at work 2024    IFG (impaired fasting glucose) 12/15/2023    Skin rash 2023    Anterior communicating artery aneurysm 2023    Elevated blood pressure reading in office without diagnosis of hypertension 2022    Cough present for greater than 3 weeks 2022    Lichen sclerosus 2022    Patellofemoral arthritis of right knee 2022    Moderate persistent asthma with acute exacerbation 2021    Chronic pain of both ankles 2021    Flat foot 2021    Family history of colon cancer 2021    Acquired hypothyroidism 2021    Mass of right axilla 2020    Family history of breast cancer 2020      LOS (days): 1  Geometric Mean LOS (GMLOS) (days):   Days to GMLOS:     OBJECTIVE:  PATIENT READMITTED TO HOSPITAL  Risk of Unplanned Readmission Score: 14.53         Current admission status: Inpatient       Preferred Pharmacy:   [facility] #[id number] - [place] - [address] [address]  Phone: [phone] Fax: [phone]    [facility] - [place] - [address] [place] [address] [address]  [address] [facility] [address]  [place] [address]  Phone: [phone] Fax: [phone]    Primary Care Provider: Celsa Mayo MD    Primary Insurance: BLUE CROSS  Secondary Insurance:     ASSESSMENT:  Active Health Care Proxies    There are no active Health Care Proxies on file.                                                          DISCHARGE DETAILS:    Discharge planning discussed with:: chart  reviewed. Pt continue to require ICu level of care. Cm will continue to follow as needed. Cm spoke to pt's spouse who expressed unsure if pt will make it out of hospital. Cm provided offered support and made aware Cm dept will continue to follow as needed.

## 2024-05-11 NOTE — PROGRESS NOTES
[place] Brain Death Exam  Aleah Newton 53 y.o. female MRN: [id number]  Unit/Bed#: ICU 09 Encounter: [id number]    Medical prerequisites:    Core temperature >=36C (96.8F)    Temp (24hrs), Av.5 °F (36.4 °C), Min:96.8 °F (36 °C), Max:98.5 °F (36.9 °C)  Current: Temperature: (!) 96.8 °F (36 °C)    Stable systolic BP >= 100 or MAP >60    Vitals:    24 1615 24 1700 24 1800 24 1900   BP:   145/78    Pulse:  90 86 86   Resp:  (!) 0 12 12   Temp: 98.5 °F (36.9 °C) (!) 96.8 °F (36 °C) (!) 96.8 °F (36 °C) (!) 96.8 °F (36 °C)   TempSrc: Oral      SpO2:  100% 100% 100%     Arterial Line BP: 136/72  Arterial Line MAP (mmHg): 96 mmHg    Clinical Exam:    GCS: 3T  Motor response to pain:   No purposeful response to painful stimuli  Pupils: No pupillary response to bright light   Pupils 6 mm in size   Ocular Movement:    No corneal reflex present   No facial movement to painful stimuli   Pharyngeal and tracheal reflexes:   No response to stimulation of posterior pharynx with  tongue blade   No response to bronchial stimulation  Vestibular Reflexes:   No oculovestibular reflex present (caloric testing)   No oculocephalic reflex present (doll's eyes)  Apnea Testing (patient must be eucapnic to perform this exam)   Patient was placed on 100% FIO2 for 15 minutes prior to the test initiation      Time 447pm baseline ABG 0 minutes   HR 86 /80, AC ventilation  , PEEP 6, FiO2 100%   pH 7.368, pCO2 34.8, PaO2, greater than 400   Time 4:52 PM ABG at 5 minutes    HR 88 /73   pH 7.249, pCO2 59.7, PaO2 greater than 400   Time 4:57 PM ABG at 10 min   HR 89 /69   pH 7.159, pCO2 72.6, PaO2 greater than 400    Ancillary Tests to support Brain Death Diagnosis:    None      I testify that the clinical history, laboratory data, and medication administration record have been reviewed and there are no confounding factors that may be contributing to this patient's neurological exam findings.       Date of death: May 11, 2024  Time death pronounced: 4:57 PM    SIGNATURE: Maddie Turner DO

## 2024-05-11 NOTE — PLAN OF CARE
Problem: NEUROSENSORY - ADULT  Goal: Achieves stable or improved neurological status  Description: INTERVENTIONS  - Monitor and report changes in neurological status  - Monitor vital signs such as temperature, blood pressure, glucose, and any other labs ordered   - Initiate measures to prevent increased intracranial pressure  - Monitor for seizure activity and implement precautions if appropriate      Outcome: Not Progressing  Goal: Remains free of injury related to seizures activity  Description: INTERVENTIONS  - Maintain airway, patient safety  and administer oxygen as ordered  - Monitor patient for seizure activity, document and report duration and description of seizure to physician/advanced practitioner  - If seizure occurs,  ensure patient safety during seizure  - Reorient patient post seizure  - Seizure pads on all 4 side rails  - Instruct patient/family to notify RN of any seizure activity including if an aura is experienced  - Instruct patient/family to call for assistance with activity based on nursing assessment  - Administer anti-seizure medications if ordered    Outcome: Not Progressing  Goal: Achieves maximal functionality and self care  Description: INTERVENTIONS  - Monitor swallowing and airway patency with patient fatigue and changes in neurological status  - Encourage and assist patient to increase activity and self care.   - Encourage visually impaired, hearing impaired and aphasic patients to use assistive/communication devices  Outcome: Not Progressing

## 2024-05-11 NOTE — PROGRESS NOTES
I have personally seen and examined patient and reviewed all data with resident. Agree with note, assessment and plan. Critical care time 59 min . Please refer to attending comments below. Critical care time does not include procedures, family meeting or teaching.     Right frontal temporal ICH with significant brain compression and effacement of basilar cisterns with mass effect left to right shift-initial NIH 27, ICH 3  Acute hypoxic respiratory failure  Hypertensive emergency  Platelet dysfunction secondary to dual antiplatelet therapy  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  Bradycardia  Hypothermia postop  Hypernatremia-secondary to hypertonic saline  Metabolic acidosis-nongap secondary to hypertonic saline  Hypomagnesemia-replete and recheck  Hypokalemia-replete and recheck  Hypophosphatemia-replete and recheck  Anemia    Exam:  Endotracheal tube in place, pupils 6 mm and not reactive to light, no corneal reflex, no gag, no cough, no response to noxious stimuli    Goal systolic blood pressure:  Normotensive with mean arterial pressure greater than 75    Respiratory support:  Assist-control rate 16, , FiO2 60%, PEEP 6    I/O +2.5 L  UO 4.2 L  Surgical drain right scalp 95 mL      Cerebral edema/brain compression interventions:  Decompressive hemicraniectomy 5/10/2024  Hypertonic saline 3% 50 MLS per hour  Serum osmolarity 312  Serum sodium 148    Hemodynamic support:  Hydrocortisone 50 mg IV every 6 hours  Norepinephrine infusion 4 mcg/min  Vasopressin 0.03 units/min    Devices:  5/10/2024 left subclavian triple-lumen catheter  5/10/2024 right scalp surgical drain  5/10/2024 OG tube  5/10/2024 urinary catheter  5/10/2024 endotracheal tube  5/10/2024 left radial arterial line    Patient unfortunately remains with poor neurologic exam yesterday.  She was labile with her hemodynamic status requiring initiation of Levophed and vasopressin for hemodynamic support.  She was rewarmed with  external warming devices and now has a temperature of 97.5 degrees.  Sedation was held upon arrival to the ICU and not restarted overnight.  Patient received multiple doses of hypertonic saline.  She is status post emergent decompressive hemicraniectomy secondary to large ICH with mass effect and herniation syndrome.    Vital signs reviewed overnight patient maintaining appropriate mean arterial blood pressures and oxygen saturations.  Temperature had improved to 98.2 and is presently 97.5 this morning.  CT head performed this morning post decompressive craniectomy with persistent external herniation with brain parenchyma as well as notable bulging of duraplasty.  Effacement of the basal cisterns and tentorial as well as cerebellar herniation.  Worsening cerebral edema and mass effect.  Partially evacuated multifocal parenchymal subarachnoid hemorrhage and right cerebral hemorrhage.  Status post ACOM web and appears some embolization.  Laboratory data reviewed with sodium of 148 and chloride of 117.  Patient was on hypertonic saline and received multiple boluses yesterday.  Hemoglobin 7.8 this morning which is likely multifactorial with acute blood loss.  Platelet count 343.    Continue to monitor patient's neurologic exam.  Assess for appropriateness for evaluation of brain death today.  Continue with supportive care.  Unfortunately patient's imaging and neurologic exam for poor prognosis for patient's arrival.  Family was informed of this yesterday and aware that likely brain death testing today.    Continue with hemodynamic support to maintain mean arterial blood pressure greater than 75.  Presently patient is on vasopressin and norepinephrine infusion.    Maintain on ventilatory support.  Patient is not appropriate for spontaneous breathing trial.  Aggressive airway clearance protocol.  Maintain saturations greater than 92%.    Maintain off DVT prophylaxis as well as antiplatelet and anticoagulation  medications.    Discontinue all sedation medications.    Maintain normothermia.    Update: 105pm    Na 147    Sosm 312    ABG 7.3/33.3/141, BE -4.4    Hb 7.0    Decrease vent rate 12

## 2024-05-11 NOTE — PROGRESS NOTES
[facility]  Progress Note: Critical Care  Name: Aleah Newton 53 y.o. female I MRN: [id number]  Unit/Bed#: ICU 09 I Date of Admission: 5/10/2024   Date of Service: 5/11/2024 I Hospital Day: 1    Assessment/Plan   Neuro:   Diagnosis: Right sided frontotemporal lobe intraparenchymal hemorrhage w/ vasogenic edema and mass effect s/p right hemispheric craniectomy and hematoma evacuation  Plan:   5/10 CT: acute large heterogeneous parenchymal hemorrhage in the R parietal region. Associated edema with 1.2cm midline shift and effacement on the suprasellar cistern.  5/10 CTA: CTA demonstrated negative for any acute vascular etiology assocated with R parietal parenchymal hemorrhage. Stable appearance of acom web embolization.  5/10: Repeat CTH pre-op: Similar size of right frontotemporal hemorrhage. Stable leftward midline shift. Interval increased effacement of the basilar cisterns. Dilated temporal horn of the left lateral ventricle suggest developing hydrocephalus. Possible small amount of subarachnoid hemorrhage.  5/10: Emergent right hemispheric craniectomy and hematoma evacuation  5/10: CTH post op: Brain has herniated outside of the craniectomy margin, scattered intraparenchymal H and SAH, w/ findings suggestive of cytotoxic edema potentially representing anoxia/infarct. Additional findings compatible with downward transtentorial herniation. Hematoma decreased in size and decrease in midline shift, currently 7 mm, previously 1.4 cm.  5/10 - L subclavian CVC placed, confirmed w/ CXR  Intra Op: Received x6 u platelets, 1 u PRBCs  On arrival: received x2-23.4%-30 cc bullets of hypertonic saline  5/10 POC ocular US demonstrated evidence of bilateral retinal sheath swelling consistent w/ ICP of at least 20  BP Goal, MAP > 75, Sys < 160  Q1 neuro checks, 24 hour repeat neuro exam prior to brain death confirmation  Seizure Ppx - Keppra 500 BID  ICP management:   Goal of Na > 145-155,  repeat Na was down trending HT increased to rate of 50cc/hr NS  Q6/h BMP repeats  Maintain CCP > 65  Temperature control - maintain normothermia  Tylenol as needed q8/hr for fever prevention  Pain management: Dilaudid, Oxycodone - held  Family made aware of poor prognosis, gift of life contacted  Neurosurgery and Neurology following, recs appreciated  Plan for assessment of brain death and conversation w/ family about next steps this afternoon     CV:   Diagnosis: Distributive Shock  Plan:  Lactic acid+, down trending - on arrival BP 70s/50s  Secondary to brainstem herniation, bedside US demonstrates hyperdynamic heart tones, volume down, given x2 bolus of 1 L NS  Treating w/ combination Levo, Vasopressin, Stress dose steroids (solucortef 50mg q6h)  Volume resuscitation - maintenance fluids @ 75cc/hr     Pulm:  Diagnosis: Respiratory insufficiency  Plan:  Vent - VC, rr 14, , Peep 6, FiO2 60%  pH improving  Pt intubated for protection of airway  Propofol held  Respiratory protocol / Oral hygiene / peridex     GI:   Diagnosis: Bowel Regimen   Plan:   Place OG tube for stomach decompression  NPO at this time  Senna, colace, miralax - on hold      :   Diagnosis: Hypernatremia   Plan:   Expected in setting of hypertonic saline administration  Oliva in place, monitor IO's for evidence of DI (>250cc/hr urine output x2)     F/E/N:    F: NS maintenance - 75 cc/hr  E: Correct as needed - given Mg and Ca   N: NPO     Heme/Onc:   Diagnosis: DVT PPx  Plan: Held in setting of acute hemorrhage     Diagnosis: Anemia, acute blood loss  Plan:  CBC: 8.6, post op w/ x1 unit PRBCs given, from 12.3 on presentation  Trend CBC, monitor craniectomy drain and craniectomy flap  Transfuse as needed for Hgb < 7       Diagnosis: Lactic Acidosis  Plan:  Initial value: 4.5, repeat 3.3, downtrending  Secondary to distributive shock in setting of complete brainstem herniation  Continue w/ treatment of distributive shock + brainstem  herniation  Fluid resuscitation  Labs for evidence of organ dysfunction     Endo:   No active issues,      ID:   Diagnosis: Surgical Ppx S/P R craniecotomy  Plan:  2g Ancef IV Q8 for x3 doses     Diagnosis: Leukocytosis  Plan:  Reactive, trend WBCs, monitor fever curve     MSK/Skin:   Diagnosis: Pressure ulcer PPx  Plan: regular turning    Disposition: Critical care    ICU Core Measures     Vented Patient  VAP Bundle  VAP bundle ordered     A: Assess, Prevent, and Manage Pain Has pain been assessed? Yes  Need for changes to pain regimen? No   B: Both Spontaneous Awakening Trials (SATs) and Spontaneous Breathing Trials (SBTs) Plan to perform spontaneous awakening trial today? N/A   Plan to perform spontaneous breathing trial today? N/A   Obvious barriers to extubation? Yes   C: Choice of Sedation RASS Goal: 0 Alert and Calm  Need for changes to sedation or analgesia regimen? No   D: Delirium CAM-ICU: n/a   E: Early Mobility  Plan for early mobility? N/a   F: Family Engagement Plan for family engagement today? Yes       Antibiotic Review: n/a    Review of Invasive Devices:    Oliva Plan: Continue for accurate I/O monitoring for 48 hours  Central access plan: Medications requiring central line  [place] Plan: Keep arterial line for hemodynamic monitoring    Prophylaxis:  VTE Contraindicated secondary to: brain bleed   Stress Ulcer  not ordered        Significant 24hr Events     24hr events: levo titrated down, vaso remains on, HT saline drip increased from 30 to 50 in accordance w/ sodium levels down trending, last taken shows level of 145. Urine output remains consistent w/o evidence of DI, temperatures have improved and are now normothermic.     Subjective     Review of Systems     Objective                            Vitals I/O      Most Recent Min/Max in 24hrs   Temp (!) 96.8 °F (36 °C) Temp  Min: 96.8 °F (36 °C)  Max: 98.5 °F (36.9 °C)   Pulse 86 Pulse  Min: 84  Max: 96   Resp 12 Resp  Min: 0  Max: 16   /78 BP   Min: 103/66  Max: 187/109   O2 Sat 100 % SpO2  Min: 100 %  Max: 100 %      Intake/Output Summary (Last 24 hours) at 5/11/2024 2051  Last data filed at 5/11/2024 1800  Gross per 24 hour   Intake 1826.69 ml   Output 1970 ml   Net -143.31 ml       Diet NPO    Invasive Monitoring   Arterial Line  [place] /72  Arterial Line BP  Min: 110/64  Max: 168/76   MAP 96 mmHg  Arterial Line MAP (mmHg)  Min: 82 mmHg  Max: 122 mmHg           Physical Exam   Physical Exam  Eyes:      Comments: Pupils fixed, dilated, no corneal reflex   Skin:     General: Skin is warm and dry.   HENT:      Head:      Comments: R scalp flap is very tense, almost indiscernible from the skull on the left, staples in place, under tension w/ some evidence of dried blood    Swelling of the left side of the face is present, the drain to the patients craniectomy site is not draining, appears clogged w/ material consistent w/ brain matter  Cardiovascular:      Rate and Rhythm: Normal rate and regular rhythm.   Abdominal: General: There is no distension.      Palpations: Abdomen is soft.      Tenderness: There is no abdominal tenderness. There is no guarding.   Constitutional:       Appearance: She is well-developed and well-nourished.      Interventions: She is intubated. She is not sedated and not restrained.  Pulmonary:      Effort: Pulmonary effort is normal. She is intubated.      Breath sounds: Normal breath sounds. No wheezing, rhonchi or rales.   Neurological:      Comments: Absent gag, cough, no response to pain in bilat upper and lower extremities, pupils remain midline w/ movement of head / neck   Genitourinary/Anorectal:  Oliva present.          Diagnostic Studies      EKG: n/a  Imaging: CTH noncon:  IMPRESSION:     Status post decompressive craniectomy with persistent external herniation of the brain parenchyma and notable bulging of the duraplasty.     Redemonstrated effacement of the basal cisterns and transtentorial as well as cerebellar  herniation.     Worsening cerebral edema and mass effect with multifocal zones of cerebral ischemia and possibly hypoxic-ischemic injury.     Stable partially evacuated multifocal parenchymal and subarachnoid hemorrhage within the right cerebral hemisphere.     Persistent focal hemorrhage within the right medial thalamus with intraventricular extension.     Status post ACOM Web aneurysm embolization. I have personally reviewed pertinent reports.       Medications:  Scheduled PRN   acetaminophen, 975 mg, Q8H JOE  albuterol, ,   albuterol, 2.5 mg, Q4H  cefazolin, 2,000 mg, Q8H  cefTAZidime, 2,000 mg, Q8H  chlorhexidine, 15 mL, Q12H JOE  dextrose, 50 mL, Once  hydrocortisone sodium succinate, 50 mg, Q6H JOE  insulin regular, 20 Units, Once  levothyroxine, 20 mcg, Once  [START ON 5/12/2024] methylPREDNISolone sodium succinate, 1,000 mg, Q6H JOE  polyethylene glycol, 17 g, Daily  senna, 1 tablet, Daily  vasopressin, 1 Units, Once      albuterol, ,   bisacodyl, 10 mg, Daily PRN       Continuous    Levothyroxine Sodium 200 mcg in sodium chloride 0.9 % 500 mL IVPB, 20 mcg/hr  phenylephine,  mcg/min, Last Rate: 50 mcg/min (05/11/24 1943)  sodium chloride, 100 mL/hr  vasopressin, 0.04 Units/min, Last Rate: 0.04 Units/min (05/11/24 2020)         Labs:    CBC    Recent Labs     05/11/24  0433 05/11/24  1157 05/11/24  1632 05/11/24  1656 05/11/24  1701   WBC 11.17* 11.64*  --   --   --    HGB 7.8* 7.0*   < > 6.5* 6.8*   HCT 25.0* 22.6*   < > 19* 20*    255  --   --   --     < > = values in this interval not displayed.     BMP    Recent Labs     05/11/24  0433 05/11/24  1157 05/11/24  1632 05/11/24  1656 05/11/24  1701   SODIUM 148*  148* 147  --   --   --    K 4.0  4.1 3.9  --   --   --    *  118* 118*  --   --   --    CO2 21  21 22   < > 28 28   AGAP 10  9 7  --   --   --    BUN 9  9 9  --   --   --    CREATININE 0.70  0.71 0.69  --   --   --    CALCIUM 8.1*  7.9* 8.1*  --   --   --     < > = values  in this interval not displayed.       Coags    Recent Labs     05/11/24  0433 05/11/24  1157   INR 1.16 1.20*   PTT 29 27        Additional Electrolytes  Recent Labs     05/11/24  0433 05/11/24  1157 05/11/24  1632 05/11/24  1656 05/11/24  1701   MG 1.9 2.0  --   --   --    PHOS 2.5* 3.4  --   --   --    CAIONIZED  --  1.09*   < > 1.29 1.29    < > = values in this interval not displayed.          Blood Gas    Recent Labs     05/11/24  1158   PHART 7.384   OGL5DFU 34.5*   PO2ART 141.0*   [id number] 20.1*   BEART -4.4   SOURCE Line, Arterial     Recent Labs     05/10/24  0742 05/10/24  1435 05/11/24  1158   PHVEN 7.263*  --   --    TXH9SMK 48.7  --   --    PO2VEN 43.3  --   --    QWB4JKD 21.5*  --   --    BEVEN -5.7  --   --    L6LDFOA 73.0  --   --    SOURCE  --    < > Line, Arterial    < > = values in this interval not displayed.    LFTs  Recent Labs     05/11/24  0433 05/11/24  1157   ALT 9  9 8   AST 20  20 18   ALKPHOS 49  49 45   ALB 3.4*  3.4* 3.2*   TBILI 0.44  0.44 0.36       Infectious  No recent results  Glucose  Recent Labs     05/10/24  1713 05/10/24  2305 05/11/24  0433 05/11/24  1157   GLUC 134 200* 180*  182* 166*               Jarad Engel DO

## 2024-05-12 ENCOUNTER — APPOINTMENT (INPATIENT)
Dept: GASTROENTEROLOGY | Facility: HOSPITAL | Age: 54
DRG: 023 | End: 2024-05-12
Payer: COMMERCIAL

## 2024-05-12 ENCOUNTER — APPOINTMENT (INPATIENT)
Dept: RADIOLOGY | Facility: HOSPITAL | Age: 54
DRG: 023 | End: 2024-05-12
Payer: COMMERCIAL

## 2024-05-12 ENCOUNTER — ANESTHESIA EVENT (INPATIENT)
Dept: PERIOP | Facility: HOSPITAL | Age: 54
DRG: 023 | End: 2024-05-12
Payer: COMMERCIAL

## 2024-05-12 ENCOUNTER — ANESTHESIA (INPATIENT)
Dept: PERIOP | Facility: HOSPITAL | Age: 54
DRG: 023 | End: 2024-05-12
Payer: COMMERCIAL

## 2024-05-12 LAB
4HR DELTA HS TROPONIN: >1 NG/L
ALBUMIN SERPL BCP-MCNC: 3.2 G/DL (ref 3.5–5)
ALBUMIN SERPL BCP-MCNC: 3.3 G/DL (ref 3.5–5)
ALBUMIN SERPL BCP-MCNC: 3.3 G/DL (ref 3.5–5)
ALBUMIN SERPL BCP-MCNC: 3.5 G/DL (ref 3.5–5)
ALP SERPL-CCNC: 37 U/L (ref 34–104)
ALP SERPL-CCNC: 38 U/L (ref 34–104)
ALP SERPL-CCNC: 40 U/L (ref 34–104)
ALP SERPL-CCNC: 45 U/L (ref 34–104)
ALT SERPL W P-5'-P-CCNC: 6 U/L (ref 7–52)
ALT SERPL W P-5'-P-CCNC: 6 U/L (ref 7–52)
ALT SERPL W P-5'-P-CCNC: 7 U/L (ref 7–52)
ALT SERPL W P-5'-P-CCNC: 7 U/L (ref 7–52)
AMYLASE SERPL-CCNC: 111 IU/L (ref 29–103)
AMYLASE SERPL-CCNC: 165 IU/L (ref 29–103)
AMYLASE SERPL-CCNC: 45 IU/L (ref 29–103)
AMYLASE SERPL-CCNC: 71 IU/L (ref 29–103)
ANION GAP SERPL CALCULATED.3IONS-SCNC: 5 MMOL/L (ref 4–13)
ANION GAP SERPL CALCULATED.3IONS-SCNC: 7 MMOL/L (ref 4–13)
ANION GAP SERPL CALCULATED.3IONS-SCNC: 7 MMOL/L (ref 4–13)
ANION GAP SERPL CALCULATED.3IONS-SCNC: 8 MMOL/L (ref 4–13)
ANISOCYTOSIS BLD QL SMEAR: PRESENT
APTT PPP: 27 SECONDS (ref 23–37)
APTT PPP: 29 SECONDS (ref 23–37)
APTT PPP: 30 SECONDS (ref 23–37)
APTT PPP: 32 SECONDS (ref 23–37)
AST SERPL W P-5'-P-CCNC: 14 U/L (ref 13–39)
AST SERPL W P-5'-P-CCNC: 14 U/L (ref 13–39)
AST SERPL W P-5'-P-CCNC: 15 U/L (ref 13–39)
AST SERPL W P-5'-P-CCNC: 16 U/L (ref 13–39)
ATRIAL RATE: 88 BPM
BACTERIA UR QL AUTO: ABNORMAL /HPF
BASE EXCESS BLDA CALC-SCNC: -1.7 MMOL/L
BASE EXCESS BLDA CALC-SCNC: -3 MMOL/L (ref -2–3)
BASE EXCESS BLDA CALC-SCNC: -3.1 MMOL/L
BASE EXCESS BLDA CALC-SCNC: -3.4 MMOL/L
BASE EXCESS BLDA CALC-SCNC: -3.5 MMOL/L
BASE EXCESS BLDA CALC-SCNC: -3.7 MMOL/L
BASE EXCESS BLDA CALC-SCNC: -4.1 MMOL/L
BASOPHILS # BLD AUTO: 0 THOUSANDS/ÂΜL (ref 0–0.1)
BASOPHILS # BLD AUTO: 0.01 THOUSANDS/ÂΜL (ref 0–0.1)
BASOPHILS NFR BLD AUTO: 0 % (ref 0–1)
BILIRUB DIRECT SERPL-MCNC: 0.11 MG/DL (ref 0–0.2)
BILIRUB DIRECT SERPL-MCNC: 0.11 MG/DL (ref 0–0.2)
BILIRUB DIRECT SERPL-MCNC: 0.12 MG/DL (ref 0–0.2)
BILIRUB SERPL-MCNC: 0.35 MG/DL (ref 0.2–1)
BILIRUB SERPL-MCNC: 0.39 MG/DL (ref 0.2–1)
BILIRUB SERPL-MCNC: 0.51 MG/DL (ref 0.2–1)
BILIRUB SERPL-MCNC: 0.66 MG/DL (ref 0.2–1)
BILIRUB UR QL STRIP: NEGATIVE
BODY TEMPERATURE: 96.4 DEGREES FEHRENHEIT
BODY TEMPERATURE: 98.1 DEGREES FEHRENHEIT
BODY TEMPERATURE: 98.2 DEGREES FEHRENHEIT
BODY TEMPERATURE: 98.2 DEGREES FEHRENHEIT
BODY TEMPERATURE: 98.4 DEGREES FEHRENHEIT
BUN SERPL-MCNC: 10 MG/DL (ref 5–25)
BUN SERPL-MCNC: 10 MG/DL (ref 5–25)
BUN SERPL-MCNC: 11 MG/DL (ref 5–25)
BUN SERPL-MCNC: 11 MG/DL (ref 5–25)
CA-I BLD-SCNC: 1.26 MMOL/L (ref 1.12–1.32)
CALCIUM ALBUM COR SERPL-MCNC: 8.4 MG/DL (ref 8.3–10.1)
CALCIUM ALBUM COR SERPL-MCNC: 8.6 MG/DL (ref 8.3–10.1)
CALCIUM ALBUM COR SERPL-MCNC: 8.9 MG/DL (ref 8.3–10.1)
CALCIUM SERPL-MCNC: 7.8 MG/DL (ref 8.4–10.2)
CALCIUM SERPL-MCNC: 8 MG/DL (ref 8.4–10.2)
CALCIUM SERPL-MCNC: 8.3 MG/DL (ref 8.4–10.2)
CALCIUM SERPL-MCNC: 8.3 MG/DL (ref 8.4–10.2)
CARDIAC TROPONIN I PNL SERPL HS: 3 NG/L
CHLORIDE SERPL-SCNC: 116 MMOL/L (ref 96–108)
CHLORIDE SERPL-SCNC: 117 MMOL/L (ref 96–108)
CHLORIDE SERPL-SCNC: 118 MMOL/L (ref 96–108)
CHLORIDE SERPL-SCNC: 121 MMOL/L (ref 96–108)
CK SERPL-CCNC: 144 U/L (ref 26–192)
CK SERPL-CCNC: 162 U/L (ref 26–192)
CK SERPL-CCNC: 180 U/L (ref 26–192)
CK SERPL-CCNC: 182 U/L (ref 26–192)
CLARITY UR: CLEAR
CO2 SERPL-SCNC: 21 MMOL/L (ref 21–32)
CO2 SERPL-SCNC: 22 MMOL/L (ref 21–32)
COLOR UR: ABNORMAL
CREAT SERPL-MCNC: 0.62 MG/DL (ref 0.6–1.3)
CREAT SERPL-MCNC: 0.66 MG/DL (ref 0.6–1.3)
CREAT SERPL-MCNC: 0.7 MG/DL (ref 0.6–1.3)
CREAT SERPL-MCNC: 0.71 MG/DL (ref 0.6–1.3)
EOSINOPHIL # BLD AUTO: 0 THOUSAND/ÂΜL (ref 0–0.61)
EOSINOPHIL NFR BLD AUTO: 0 % (ref 0–6)
ERYTHROCYTE [DISTWIDTH] IN BLOOD BY AUTOMATED COUNT: 14.6 % (ref 11.6–15.1)
ERYTHROCYTE [DISTWIDTH] IN BLOOD BY AUTOMATED COUNT: 14.8 % (ref 11.6–15.1)
ERYTHROCYTE [DISTWIDTH] IN BLOOD BY AUTOMATED COUNT: 15.4 % (ref 11.6–15.1)
ERYTHROCYTE [DISTWIDTH] IN BLOOD BY AUTOMATED COUNT: 16.7 % (ref 11.6–15.1)
GFR SERPL CREATININE-BSD FRML MDRD: 100 ML/MIN/1.73SQ M
GFR SERPL CREATININE-BSD FRML MDRD: 102 ML/MIN/1.73SQ M
GFR SERPL CREATININE-BSD FRML MDRD: 96 ML/MIN/1.73SQ M
GFR SERPL CREATININE-BSD FRML MDRD: 98 ML/MIN/1.73SQ M
GGT SERPL-CCNC: 7 U/L (ref 9–64)
GLUCOSE SERPL-MCNC: 144 MG/DL (ref 65–140)
GLUCOSE SERPL-MCNC: 147 MG/DL (ref 65–140)
GLUCOSE SERPL-MCNC: 147 MG/DL (ref 65–140)
GLUCOSE SERPL-MCNC: 148 MG/DL (ref 65–140)
GLUCOSE SERPL-MCNC: 156 MG/DL (ref 65–140)
GLUCOSE SERPL-MCNC: 177 MG/DL (ref 65–140)
GLUCOSE UR STRIP-MCNC: NEGATIVE MG/DL
HCO3 BLDA-SCNC: 21.2 MMOL/L (ref 22–28)
HCO3 BLDA-SCNC: 21.5 MMOL/L (ref 22–28)
HCO3 BLDA-SCNC: 21.5 MMOL/L (ref 22–28)
HCO3 BLDA-SCNC: 21.6 MMOL/L (ref 22–28)
HCO3 BLDA-SCNC: 22 MMOL/L (ref 22–28)
HCO3 BLDA-SCNC: 22.6 MMOL/L (ref 22–28)
HCO3 BLDA-SCNC: 23.5 MMOL/L (ref 22–28)
HCT VFR BLD AUTO: 18.3 % (ref 34.8–46.1)
HCT VFR BLD AUTO: 23.5 % (ref 34.8–46.1)
HCT VFR BLD AUTO: 24.2 % (ref 34.8–46.1)
HCT VFR BLD AUTO: 26.5 % (ref 34.8–46.1)
HCT VFR BLD CALC: 16 % (ref 34.8–46.1)
HGB BLD-MCNC: 5.7 G/DL (ref 11.5–15.4)
HGB BLD-MCNC: 7.8 G/DL (ref 11.5–15.4)
HGB BLD-MCNC: 8 G/DL (ref 11.5–15.4)
HGB BLD-MCNC: 8.7 G/DL (ref 11.5–15.4)
HGB BLDA-MCNC: 5.4 G/DL (ref 11.5–15.4)
HGB UR QL STRIP.AUTO: ABNORMAL
HOROWITZ INDEX BLDA+IHG-RTO: 100 MM[HG]
HOROWITZ INDEX BLDA+IHG-RTO: 40 MM[HG]
IMM GRANULOCYTES # BLD AUTO: 0.1 THOUSAND/UL (ref 0–0.2)
IMM GRANULOCYTES # BLD AUTO: 0.11 THOUSAND/UL (ref 0–0.2)
IMM GRANULOCYTES # BLD AUTO: 0.16 THOUSAND/UL (ref 0–0.2)
IMM GRANULOCYTES # BLD AUTO: 0.18 THOUSAND/UL (ref 0–0.2)
IMM GRANULOCYTES NFR BLD AUTO: 1 % (ref 0–2)
IMM GRANULOCYTES NFR BLD AUTO: 2 % (ref 0–2)
INR PPP: 1.27 (ref 0.84–1.19)
INR PPP: 1.29 (ref 0.84–1.19)
INR PPP: 1.31 (ref 0.84–1.19)
INR PPP: 1.31 (ref 0.84–1.19)
KETONES UR STRIP-MCNC: NEGATIVE MG/DL
LEUKOCYTE ESTERASE UR QL STRIP: NEGATIVE
LIPASE SERPL-CCNC: 11 U/L (ref 11–82)
LIPASE SERPL-CCNC: 12 U/L (ref 11–82)
LIPASE SERPL-CCNC: 13 U/L (ref 11–82)
LIPASE SERPL-CCNC: 8 U/L (ref 11–82)
LYMPHOCYTES # BLD AUTO: 0.49 THOUSANDS/ÂΜL (ref 0.6–4.47)
LYMPHOCYTES # BLD AUTO: 0.57 THOUSANDS/ÂΜL (ref 0.6–4.47)
LYMPHOCYTES # BLD AUTO: 0.58 THOUSANDS/ÂΜL (ref 0.6–4.47)
LYMPHOCYTES # BLD AUTO: 0.67 THOUSANDS/ÂΜL (ref 0.6–4.47)
LYMPHOCYTES NFR BLD AUTO: 4 % (ref 14–44)
LYMPHOCYTES NFR BLD AUTO: 5 % (ref 14–44)
LYMPHOCYTES NFR BLD AUTO: 5 % (ref 14–44)
LYMPHOCYTES NFR BLD AUTO: 6 % (ref 14–44)
MAGNESIUM SERPL-MCNC: 2 MG/DL (ref 1.9–2.7)
MCH RBC QN AUTO: 27.5 PG (ref 26.8–34.3)
MCH RBC QN AUTO: 28.2 PG (ref 26.8–34.3)
MCH RBC QN AUTO: 29 PG (ref 26.8–34.3)
MCH RBC QN AUTO: 29.3 PG (ref 26.8–34.3)
MCHC RBC AUTO-ENTMCNC: 31.1 G/DL (ref 31.4–37.4)
MCHC RBC AUTO-ENTMCNC: 32.8 G/DL (ref 31.4–37.4)
MCHC RBC AUTO-ENTMCNC: 33.1 G/DL (ref 31.4–37.4)
MCHC RBC AUTO-ENTMCNC: 33.2 G/DL (ref 31.4–37.4)
MCV RBC AUTO: 86 FL (ref 82–98)
MCV RBC AUTO: 88 FL (ref 82–98)
MICROCYTES BLD QL AUTO: PRESENT
MONOCYTES # BLD AUTO: 0.36 THOUSAND/ÂΜL (ref 0.17–1.22)
MONOCYTES # BLD AUTO: 0.36 THOUSAND/ÂΜL (ref 0.17–1.22)
MONOCYTES # BLD AUTO: 0.38 THOUSAND/ÂΜL (ref 0.17–1.22)
MONOCYTES # BLD AUTO: 0.56 THOUSAND/ÂΜL (ref 0.17–1.22)
MONOCYTES NFR BLD AUTO: 3 % (ref 4–12)
MONOCYTES NFR BLD AUTO: 3 % (ref 4–12)
MONOCYTES NFR BLD AUTO: 4 % (ref 4–12)
MONOCYTES NFR BLD AUTO: 4 % (ref 4–12)
MUCOUS THREADS UR QL AUTO: ABNORMAL
NEUTROPHILS # BLD AUTO: 10.34 THOUSANDS/ÂΜL (ref 1.85–7.62)
NEUTROPHILS # BLD AUTO: 12.32 THOUSANDS/ÂΜL (ref 1.85–7.62)
NEUTROPHILS # BLD AUTO: 8.61 THOUSANDS/ÂΜL (ref 1.85–7.62)
NEUTROPHILS # BLD AUTO: 9.82 THOUSANDS/ÂΜL (ref 1.85–7.62)
NEUTS SEG NFR BLD AUTO: 89 % (ref 43–75)
NEUTS SEG NFR BLD AUTO: 90 % (ref 43–75)
NEUTS SEG NFR BLD AUTO: 91 % (ref 43–75)
NEUTS SEG NFR BLD AUTO: 91 % (ref 43–75)
NITRITE UR QL STRIP: NEGATIVE
NON-SQ EPI CELLS URNS QL MICRO: ABNORMAL /HPF
NRBC BLD AUTO-RTO: 0 /100 WBCS
O2 CT BLDA-SCNC: 11.5 ML/DL (ref 16–23)
O2 CT BLDA-SCNC: 11.9 ML/DL (ref 16–23)
O2 CT BLDA-SCNC: 12.5 ML/DL (ref 16–23)
O2 CT BLDA-SCNC: 12.8 ML/DL (ref 16–23)
O2 CT BLDA-SCNC: 14.1 ML/DL (ref 16–23)
O2 CT BLDA-SCNC: 9.1 ML/DL (ref 16–23)
OXYHGB MFR BLDA: 97.6 % (ref 94–97)
OXYHGB MFR BLDA: 97.7 % (ref 94–97)
OXYHGB MFR BLDA: 98.7 % (ref 94–97)
OXYHGB MFR BLDA: 98.8 % (ref 94–97)
OXYHGB MFR BLDA: 98.9 % (ref 94–97)
OXYHGB MFR BLDA: 99 % (ref 94–97)
P AXIS: 73 DEGREES
PCO2 BLD: 23 MMOL/L (ref 21–32)
PCO2 BLD: 40.5 MM HG (ref 36–44)
PCO2 BLDA: 37.9 MM HG (ref 36–44)
PCO2 BLDA: 39 MM HG (ref 36–44)
PCO2 BLDA: 39.1 MM HG (ref 36–44)
PCO2 BLDA: 39.3 MM HG (ref 36–44)
PCO2 BLDA: 41.9 MM HG (ref 36–44)
PCO2 BLDA: 43.9 MM HG (ref 36–44)
PCO2 TEMP ADJ BLDA: 38.5 MM HG (ref 36–44)
PCO2 TEMP ADJ BLDA: 38.9 MM HG (ref 36–44)
PCO2 TEMP ADJ BLDA: 39.8 MM HG (ref 36–44)
PCO2 TEMP ADJ BLDA: 43.5 MM HG (ref 36–44)
PEEP RESPIRATORY: 5 CM[H2O]
PEEP RESPIRATORY: 8 CM[H2O]
PEEP RESPIRATORY: 8 CM[H2O]
PH BLD: 7.33 [PH] (ref 7.35–7.45)
PH BLD: 7.34 [PH] (ref 7.35–7.45)
PH BLD: 7.35 [PH] (ref 7.35–7.45)
PH BLD: 7.37 [PH] (ref 7.35–7.45)
PH BLD: 7.38 [PH] (ref 7.35–7.45)
PH BLDA: 7.33 [PH] (ref 7.35–7.45)
PH BLDA: 7.35 [PH] (ref 7.35–7.45)
PH BLDA: 7.36 [PH] (ref 7.35–7.45)
PH BLDA: 7.36 [PH] (ref 7.35–7.45)
PH BLDA: 7.37 [PH] (ref 7.35–7.45)
PH BLDA: 7.37 [PH] (ref 7.35–7.45)
PH UR STRIP.AUTO: 5.5 [PH]
PHOSPHATE SERPL-MCNC: 1.3 MG/DL (ref 2.7–4.5)
PLATELET # BLD AUTO: 133 THOUSANDS/UL (ref 149–390)
PLATELET # BLD AUTO: 141 THOUSANDS/UL (ref 149–390)
PLATELET # BLD AUTO: 142 THOUSANDS/UL (ref 149–390)
PLATELET # BLD AUTO: 172 THOUSANDS/UL (ref 149–390)
PLATELET BLD QL SMEAR: ADEQUATE
PMV BLD AUTO: 11.1 FL (ref 8.9–12.7)
PMV BLD AUTO: 11.6 FL (ref 8.9–12.7)
PO2 BLD: 281.8 MM HG (ref 75–129)
PO2 BLD: 349 MM HG (ref 75–129)
PO2 BLD: 358.8 MM HG (ref 75–129)
PO2 BLD: 375.8 MM HG (ref 75–129)
PO2 BLD: 376.4 MM HG (ref 75–129)
PO2 BLDA: 124.5 MM HG (ref 75–129)
PO2 BLDA: 287.5 MM HG (ref 75–129)
PO2 BLDA: 325.6 MM HG (ref 75–129)
PO2 BLDA: 360.4 MM HG (ref 75–129)
PO2 BLDA: 376.9 MM HG (ref 75–129)
PO2 BLDA: 376.9 MM HG (ref 75–129)
POTASSIUM BLD-SCNC: 3.6 MMOL/L (ref 3.5–5.3)
POTASSIUM SERPL-SCNC: 3.6 MMOL/L (ref 3.5–5.3)
POTASSIUM SERPL-SCNC: 3.7 MMOL/L (ref 3.5–5.3)
POTASSIUM SERPL-SCNC: 3.7 MMOL/L (ref 3.5–5.3)
POTASSIUM SERPL-SCNC: 3.8 MMOL/L (ref 3.5–5.3)
PR INTERVAL: 138 MS
PROT SERPL-MCNC: 5.1 G/DL (ref 6.4–8.4)
PROT SERPL-MCNC: 5.5 G/DL (ref 6.4–8.4)
PROT SERPL-MCNC: 5.6 G/DL (ref 6.4–8.4)
PROT SERPL-MCNC: 5.7 G/DL (ref 6.4–8.4)
PROT UR STRIP-MCNC: ABNORMAL MG/DL
PROTHROMBIN TIME: 15.8 SECONDS (ref 11.6–14.5)
PROTHROMBIN TIME: 16 SECONDS (ref 11.6–14.5)
PROTHROMBIN TIME: 16.1 SECONDS (ref 11.6–14.5)
PROTHROMBIN TIME: 16.1 SECONDS (ref 11.6–14.5)
QRS AXIS: 59 DEGREES
QRSD INTERVAL: 88 MS
QT INTERVAL: 350 MS
QTC INTERVAL: 424 MS
RBC # BLD AUTO: 2.07 MILLION/UL (ref 3.81–5.12)
RBC # BLD AUTO: 2.66 MILLION/UL (ref 3.81–5.12)
RBC # BLD AUTO: 2.76 MILLION/UL (ref 3.81–5.12)
RBC # BLD AUTO: 3.09 MILLION/UL (ref 3.81–5.12)
RBC #/AREA URNS AUTO: ABNORMAL /HPF
RBC MORPH BLD: PRESENT
SAO2 % BLD FROM PO2: 100 % (ref 60–85)
SODIUM BLD-SCNC: 148 MMOL/L (ref 136–145)
SODIUM SERPL-SCNC: 145 MMOL/L (ref 135–147)
SODIUM SERPL-SCNC: 146 MMOL/L (ref 135–147)
SODIUM SERPL-SCNC: 147 MMOL/L (ref 135–147)
SODIUM SERPL-SCNC: 148 MMOL/L (ref 135–147)
SP GR UR STRIP.AUTO: 1.02 (ref 1–1.03)
SPECIMEN SOURCE: ABNORMAL
T WAVE AXIS: 49 DEGREES
UROBILINOGEN UR STRIP-ACNC: <2 MG/DL
VENT AC: 12
VENT AC: 12
VENT AC: 18
VENT- AC: AC
VENTRICULAR RATE: 88 BPM
VT SETTING VENT: 330 ML
VT SETTING VENT: 450 ML
VT SETTING VENT: 450 ML
WBC # BLD AUTO: 11.05 THOUSAND/UL (ref 4.31–10.16)
WBC # BLD AUTO: 11.4 THOUSAND/UL (ref 4.31–10.16)
WBC # BLD AUTO: 13.62 THOUSAND/UL (ref 4.31–10.16)
WBC # BLD AUTO: 9.57 THOUSAND/UL (ref 4.31–10.16)
WBC #/AREA URNS AUTO: ABNORMAL /HPF

## 2024-05-12 PROCEDURE — 82150 ASSAY OF AMYLASE: CPT | Performed by: REGISTERED NURSE

## 2024-05-12 PROCEDURE — 94760 N-INVAS EAR/PLS OXIMETRY 1: CPT

## 2024-05-12 PROCEDURE — 84295 ASSAY OF SERUM SODIUM: CPT

## 2024-05-12 PROCEDURE — 82550 ASSAY OF CK (CPK): CPT | Performed by: REGISTERED NURSE

## 2024-05-12 PROCEDURE — P9016 RBC LEUKOCYTES REDUCED: HCPCS

## 2024-05-12 PROCEDURE — 83690 ASSAY OF LIPASE: CPT | Performed by: REGISTERED NURSE

## 2024-05-12 PROCEDURE — 85730 THROMBOPLASTIN TIME PARTIAL: CPT | Performed by: REGISTERED NURSE

## 2024-05-12 PROCEDURE — 82248 BILIRUBIN DIRECT: CPT | Performed by: REGISTERED NURSE

## 2024-05-12 PROCEDURE — 80053 COMPREHEN METABOLIC PANEL: CPT | Performed by: REGISTERED NURSE

## 2024-05-12 PROCEDURE — 82805 BLOOD GASES W/O2 SATURATION: CPT | Performed by: REGISTERED NURSE

## 2024-05-12 PROCEDURE — 84100 ASSAY OF PHOSPHORUS: CPT | Performed by: EMERGENCY MEDICINE

## 2024-05-12 PROCEDURE — 82947 ASSAY GLUCOSE BLOOD QUANT: CPT

## 2024-05-12 PROCEDURE — 82805 BLOOD GASES W/O2 SATURATION: CPT | Performed by: EMERGENCY MEDICINE

## 2024-05-12 PROCEDURE — 85014 HEMATOCRIT: CPT

## 2024-05-12 PROCEDURE — 82948 REAGENT STRIP/BLOOD GLUCOSE: CPT

## 2024-05-12 PROCEDURE — 82248 BILIRUBIN DIRECT: CPT | Performed by: EMERGENCY MEDICINE

## 2024-05-12 PROCEDURE — 82803 BLOOD GASES ANY COMBINATION: CPT

## 2024-05-12 PROCEDURE — 94003 VENT MGMT INPAT SUBQ DAY: CPT

## 2024-05-12 PROCEDURE — 82805 BLOOD GASES W/O2 SATURATION: CPT

## 2024-05-12 PROCEDURE — 84132 ASSAY OF SERUM POTASSIUM: CPT

## 2024-05-12 PROCEDURE — 84484 ASSAY OF TROPONIN QUANT: CPT | Performed by: REGISTERED NURSE

## 2024-05-12 PROCEDURE — 85610 PROTHROMBIN TIME: CPT | Performed by: REGISTERED NURSE

## 2024-05-12 PROCEDURE — 85025 COMPLETE CBC W/AUTO DIFF WBC: CPT | Performed by: REGISTERED NURSE

## 2024-05-12 PROCEDURE — 82977 ASSAY OF GGT: CPT | Performed by: EMERGENCY MEDICINE

## 2024-05-12 PROCEDURE — 71045 X-RAY EXAM CHEST 1 VIEW: CPT

## 2024-05-12 PROCEDURE — 83735 ASSAY OF MAGNESIUM: CPT | Performed by: EMERGENCY MEDICINE

## 2024-05-12 PROCEDURE — 94640 AIRWAY INHALATION TREATMENT: CPT

## 2024-05-12 PROCEDURE — 83625 ASSAY OF LDH ENZYMES: CPT | Performed by: EMERGENCY MEDICINE

## 2024-05-12 PROCEDURE — 83615 LACTATE (LD) (LDH) ENZYME: CPT | Performed by: EMERGENCY MEDICINE

## 2024-05-12 PROCEDURE — 82330 ASSAY OF CALCIUM: CPT

## 2024-05-12 PROCEDURE — 81001 URINALYSIS AUTO W/SCOPE: CPT | Performed by: EMERGENCY MEDICINE

## 2024-05-12 PROCEDURE — NC001 PR NO CHARGE: Performed by: EMERGENCY MEDICINE

## 2024-05-12 RX ORDER — CHLORHEXIDINE GLUCONATE ORAL RINSE 1.2 MG/ML
15 SOLUTION DENTAL ONCE
Status: DISCONTINUED | OUTPATIENT
Start: 2024-05-12 | End: 2024-05-13 | Stop reason: HOSPADM

## 2024-05-12 RX ORDER — ALBUTEROL SULFATE 2.5 MG/3ML
2.5 SOLUTION RESPIRATORY (INHALATION) EVERY 4 HOURS
Status: DISCONTINUED | OUTPATIENT
Start: 2024-05-12 | End: 2024-05-12

## 2024-05-12 RX ORDER — VANCOMYCIN HYDROCHLORIDE 1 G/200ML
1000 INJECTION, SOLUTION INTRAVENOUS EVERY 12 HOURS
Status: DISCONTINUED | OUTPATIENT
Start: 2024-05-12 | End: 2024-05-12

## 2024-05-12 RX ORDER — ALBUTEROL SULFATE 2.5 MG/3ML
2.5 SOLUTION RESPIRATORY (INHALATION) EVERY 4 HOURS PRN
Status: DISCONTINUED | OUTPATIENT
Start: 2024-05-12 | End: 2024-05-13 | Stop reason: HOSPADM

## 2024-05-12 RX ORDER — POTASSIUM CHLORIDE 20MEQ/15ML
40 LIQUID (ML) ORAL ONCE
Status: COMPLETED | OUTPATIENT
Start: 2024-05-12 | End: 2024-05-12

## 2024-05-12 RX ORDER — NOREPINEPHRINE BITARTRATE 1 MG/ML
INJECTION, SOLUTION INTRAVENOUS
Status: DISPENSED
Start: 2024-05-12 | End: 2024-05-13

## 2024-05-12 RX ADMIN — HYDROCORTISONE SODIUM SUCCINATE 50 MG: 100 INJECTION, POWDER, FOR SOLUTION INTRAMUSCULAR; INTRAVENOUS at 23:11

## 2024-05-12 RX ADMIN — CEFAZOLIN SODIUM 2000 MG: 2 SOLUTION INTRAVENOUS at 12:03

## 2024-05-12 RX ADMIN — LEVOTHYROXINE SODIUM ANHYDROUS 40 MCG/HR: 100 INJECTION, POWDER, LYOPHILIZED, FOR SOLUTION INTRAVENOUS at 05:48

## 2024-05-12 RX ADMIN — Medication 2.5 MG: at 00:19

## 2024-05-12 RX ADMIN — VANCOMYCIN HYDROCHLORIDE 1000 MG: 1 INJECTION, SOLUTION INTRAVENOUS at 08:06

## 2024-05-12 RX ADMIN — SODIUM CHLORIDE 1000 MG: 0.9 INJECTION, SOLUTION INTRAVENOUS at 23:12

## 2024-05-12 RX ADMIN — SODIUM CHLORIDE 1000 MG: 0.9 INJECTION, SOLUTION INTRAVENOUS at 05:06

## 2024-05-12 RX ADMIN — SODIUM CHLORIDE 1000 MG: 0.9 INJECTION, SOLUTION INTRAVENOUS at 12:03

## 2024-05-12 RX ADMIN — ALBUTEROL SULFATE 2.5 MG: 2.5 SOLUTION RESPIRATORY (INHALATION) at 00:19

## 2024-05-12 RX ADMIN — CEFTAZIDIME 2000 MG: 1 INJECTION, POWDER, FOR SOLUTION INTRAMUSCULAR; INTRAVENOUS at 19:04

## 2024-05-12 RX ADMIN — SODIUM CHLORIDE 150 ML/HR: 0.45 INJECTION, SOLUTION INTRAVENOUS at 19:07

## 2024-05-12 RX ADMIN — CEFTAZIDIME 2000 MG: 1 INJECTION, POWDER, FOR SOLUTION INTRAMUSCULAR; INTRAVENOUS at 03:53

## 2024-05-12 RX ADMIN — HYDROCORTISONE SODIUM SUCCINATE 50 MG: 100 INJECTION, POWDER, FOR SOLUTION INTRAMUSCULAR; INTRAVENOUS at 12:03

## 2024-05-12 RX ADMIN — POTASSIUM CHLORIDE 40 MEQ: 1.5 SOLUTION ORAL at 03:18

## 2024-05-12 RX ADMIN — SODIUM CHLORIDE 1000 MG: 0.9 INJECTION, SOLUTION INTRAVENOUS at 17:54

## 2024-05-12 RX ADMIN — LEVOTHYROXINE SODIUM ANHYDROUS 20 MCG/HR: 100 INJECTION, POWDER, LYOPHILIZED, FOR SOLUTION INTRAVENOUS at 22:12

## 2024-05-12 RX ADMIN — SODIUM CHLORIDE 1000 MG: 0.9 INJECTION, SOLUTION INTRAVENOUS at 01:26

## 2024-05-12 RX ADMIN — HYDROCORTISONE SODIUM SUCCINATE 50 MG: 100 INJECTION, POWDER, FOR SOLUTION INTRAMUSCULAR; INTRAVENOUS at 17:54

## 2024-05-12 RX ADMIN — CEFAZOLIN SODIUM 2000 MG: 2 SOLUTION INTRAVENOUS at 20:00

## 2024-05-12 RX ADMIN — CHLORHEXIDINE GLUCONATE 0.12% ORAL RINSE 15 ML: 1.2 LIQUID ORAL at 21:13

## 2024-05-12 RX ADMIN — HYDROCORTISONE SODIUM SUCCINATE 50 MG: 100 INJECTION, POWDER, FOR SOLUTION INTRAMUSCULAR; INTRAVENOUS at 05:54

## 2024-05-12 RX ADMIN — CEFTAZIDIME 2000 MG: 1 INJECTION, POWDER, FOR SOLUTION INTRAMUSCULAR; INTRAVENOUS at 13:00

## 2024-05-12 RX ADMIN — SODIUM CHLORIDE 100 ML/HR: 0.45 INJECTION, SOLUTION INTRAVENOUS at 05:49

## 2024-05-12 RX ADMIN — CEFAZOLIN SODIUM 2000 MG: 2 SOLUTION INTRAVENOUS at 03:17

## 2024-05-12 RX ADMIN — LEVOTHYROXINE SODIUM ANHYDROUS 40 MCG/HR: 100 INJECTION, POWDER, LYOPHILIZED, FOR SOLUTION INTRAVENOUS at 11:00

## 2024-05-12 RX ADMIN — SODIUM CHLORIDE 150 ML/HR: 0.45 INJECTION, SOLUTION INTRAVENOUS at 13:02

## 2024-05-12 RX ADMIN — AZITHROMYCIN MONOHYDRATE 500 MG: 500 INJECTION, POWDER, LYOPHILIZED, FOR SOLUTION INTRAVENOUS at 08:06

## 2024-05-12 NOTE — PROCEDURES
Bronchoscopy    Date/Time: 5/10/2024 8:26 AM    Performed by: Anna Bishop MD  Authorized by: Anna Bishop MD    Patient location:  Bedside  Indications:     Procedure Purpose: diagnostic    Upper Airway:     Trachea: normal      Yamilka:  Normal  Airway:     Airway:  Trachea, yamilka, right main stem, right upper, middle and lower bronchus, left main stem, left upper and lower bronchus all appear normal. No endobronchial lesions and normal mucosa noted. No secretions.            162.56

## 2024-05-12 NOTE — PROGRESS NOTES
"Critical care attending documentation   Aleah Newton 53 y.o. female MRN: [id number]  Unit/Bed#: ICU 09 Encounter: [id number] PCP: Celsa Mayo MD      Patient did not improve in her neurologic status and underwent evaluation for brain death yesterday.  She was pronounced dead on 5/11/2024 at 4:47 PM.  Patient's family was informed.  Gift of life met with family and family wishes to proceed with organ donation.    Overnight patient underwent cardiac and pulmonary evaluation for organ recovery.    Visit Vitals  /56   Pulse 90   Temp 98.2 °F (36.8 °C)   Resp 18   Ht 5' 6\" (1.676 m)   Wt 87.1 kg (192 lb)   LMP 09/12/2022 Comment: no period for 18 months   SpO2 99%   BMI 30.99 kg/m²   OB Status Postmenopausal   Smoking Status Never   BSA 1.97 m²     GEN: Unresponsive   HEENT: Endotracheal tube in place  CV: RRR, no  Murmur  Resp:  CTA, no R/R/W  GI: soft,NT/ND  : urinary catheter in place  Neuro: GCS 3 T  Skin: warm, dry    All laboratory data and imaging reviewed    Right frontal temporal ICH with significant brain compression and effacement of basilar cisterns with mass effect left to right shift-initial NIH 27, ICH 3  Acute hypoxic respiratory failure  Hypertensive emergency  Platelet dysfunction secondary to dual antiplatelet therapy  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  Bradycardia  Hypothermia postop  Hypernatremia-secondary to hypertonic saline  Metabolic acidosis-nongap secondary to hypertonic saline  Hypomagnesemia-replete and recheck  Hypokalemia-replete and recheck  Hypophosphatemia-replete and recheck  Anemia     Exam:  Endotracheal tube in place, GCS 3 T    Goal systolic blood pressure:  Normotensive with mean arterial pressure greater than 75     Respiratory support:  Assist-control rate 12, , FiO2 60%, PEEP 6     I/O +2.5 L  UO 4.6L  Surgical drain right scalp 95 mL       Cerebral edema/brain compression interventions:  Decompressive hemicraniectomy " 5/10/2024     Hemodynamic support:  Hydrocortisone 50 mg IV every 6 hours  Norepinephrine infusion 4 mcg/min  Vasopressin 0.03 units/min     Devices:  5/10/2024 left subclavian triple-lumen catheter  5/10/2024 right scalp surgical drain  5/10/2024 OG tube  5/10/2024 urinary catheter  5/10/2024 endotracheal tube  5/10/2024 left radial arterial line     Continue with organ supportive interventions.  Vital signs reviewed with all goal parameters met.  Levothyroxine protocol initiated.  Patient is presently on vasopressin for hemodynamic support and DI.  Marck-Synephrine available for hemodynamic support.  Patient is currently on half-normal saline at 150 MLS per hour.    Patient's laboratory data reviewed.  Sodium slightly elevated at 147 with chloride of 118.  Potassium low at 3.7.  Hemoglobin 8.0.    Attempted to normalize sodium levels.  Replete electrolytes as needed.  Maintain hemoglobin greater than 8.0.  Continue with ventilatory support.  Gift of life in the process of arranging organ recovery and allocation.    Patient had lability in her sodium levels as well as her urine output.  Vasopressin was adjusted to 0.02 units/min.  Patient's repeat laboratory data with lower hemoglobin.  1 unit RBC transfusion as per GOL request.    Adequate care time 35 minutes, critical care time is unclear procedures family update.

## 2024-05-12 NOTE — UTILIZATION REVIEW
Initial Clinical Review      Transfer from [facility]     Admission: Date/Time/Statement:   Admission Orders (From admission, onward)       Ordered        05/10/24 0830  Inpatient Admission  Once                          Orders Placed This Encounter   Procedures    Inpatient Admission     Standing Status:   Standing     Number of Occurrences:   1     Order Specific Question:   Level of Care     Answer:   Critical Care [15]     Order Specific Question:   Estimated length of stay     Answer:   More than 2 Midnights     Order Specific Question:   Certification     Answer:   I certify that inpatient services are medically necessary for this patient for a duration of greater than two midnights. See H&P and MD Progress Notes for additional information about the patient's course of treatment.         Initial Presentation: 53 y.o. female  pmh of an ACOM aneurysm, HTN, asthma, and hypothyroidism who underwent a WEB embolization on 4/17 of her 5mm ACOM aneurysm with Dr. Salter, who presents with intracerebral hemorrhage. During the 4/17 procedure there were no complications. Pt was discharged the next day on ASA and Brilinta. Per the , this morning, patient woke him from sleep at 6 AM, he noted that his wife was laying on the ground, she was stating that she felt sick to her stomach and warm, asked him to get a warm washcloth and turn the fan on, notes that after some time she started to make nonsensical comments, asking where the pocket was and where the washcloth was, additionally developed nausea and vomiting, at this point called EMS/presentation to the [facility] emergency department. Was found to be hypertensive by EMS with a SBP >200mmHg. found to have acute left sided weakness and a headache. She was a GCS 6 on arrival with evidence of decorticate posturing. Her SBP was 160. She was intubated and taken to CT scan which revealed a new large IP hemorrhage in the right parietal-temporal region with  an estimated volume of 89.3cc and measuring 7.2 x 4.0 x 6.2 cm, ICH score of 3. CT Imaging at [place] demonstrated evidence of intraparenchymal hemorrhage. Pt was intubated for airway protection, started on nicardipine and propofol for sedation, pt given DDAVP and then transferred to \[facility]\"" direct to CT for urgent evaluation, Neurosurgery was made aware of the pt and present for pt arrival at CT. On arrival repeat CT demonstrated repeat right frontotemporal hemorrhage. Stable leftward midline shift and increased effacement of the basilar cisterns. Additionally, dilated temporal horn of the left lateral ventricle suggest developing hydrocephalus and possible subarachnoid hemorrhage. Pt was urgently rushed to surgery for decompression.Pt was then recipient of a decompressive R hemicraniectomy and hematoma evacuation - post procedure pupils remain fixed and dilated, tense crani flap - received, intraoperatively she received 6 u/plt and 1 unit PRBCs in the OR. From the OR pt was brought to CT for repeat CT head non con, S/P right hemispheric craniectomy.  The brain has herniated outside of the craniectomy margin, demonstrating scattered foci of intraparenchymal and subarachnoid hemorrhage. Diffuse edema in the right hemisphere with loss of gray-white differentiation in the right temporal lobe, right frontal and parietal lobes as well as within the midbrain and ulysses. Findings suggestive of cytotoxic edema potentially representing anoxia/infarct. Loss of CSF cisterns in the posterior fossa with displacement of the cerebellar tonsils compatible with downward transtentorial herniation. Right frontoparietal parenchymal hematoma decreased in size status post evacuation. Sequela of recent surgery noted with gas within the residual hematoma. Interval decrease in midline shift.       5/10 OP Note   Stroke (HCC) [I63.9]  Right hemispheric hemorrhagic intraparenchymal hemorrhage on Brillinta and ASA  Operative Findings:  Patient's  pupils were 6mm and minimally reactive preop.  Right hemicraniectomy performed.  ICH evacuated.    5/10 Neurosurgery Consult   Awoke 5/10/2024 with severe headache, L sided weakness   progression to unresponsiveness with decorticate posturing on arrival to ED . Emergent transfer [facility] [place] . STAT CT head on arrival showed progression compared to present CT head  Patient taken emergently to OR with neurosurgery for decompressive R hemicraniectomy, Plan for ICU post operatively.     5/11 Critical Care Note    levo titrated down, vaso remains on, HT saline drip increased from 30 to 50 in accordance w/ sodium levels down trending, last taken shows level of 145. Urine output remains consistent w/o evidence of DI, temperatures have improved and are now normothermic.     5/11 Date of Death: 5/11/24  Time of Death:  4:57 PM  ED Triage Vitals   Temperature Pulse Respirations Blood Pressure SpO2   05/10/24 1325 05/10/24 1325 05/10/24 1325 05/10/24 1705 05/10/24 1325   (!) 94 °F (34.4 °C) (!) 48 18 (!) 79/49 95 %      Temp Source Heart Rate Source Patient Position - Orthostatic VS BP Location FiO2 (%)   05/10/24 1325 05/10/24 1325 -- 05/10/24 1705 --   Rectal Monitor  Left arm       Pain Score       --                 Wt Readings from Last 1 Encounters:   05/11/24 87.1 kg (192 lb)     Additional Vital Signs:   Date/Time Temp Pulse Resp BP MAP (mmHg) Arterial Line BP MAP SpO2 O2 Device CVP (mean)   05/12/24 0721 -- -- -- -- -- -- -- 99 % -- --   05/12/24 0429 98.2 °F (36.8 °C) 94 12 130/57 -- 130/60 88 mmHg 99 % -- --   05/12/24 0400 98.2 °F (36.8 °C) 94 12 166/92 116 154/72 108 mmHg 99 % -- 6 mmHg   05/12/24 0300 97.9 °F (36.6 °C) 98 12 112/56 71 102/44 66 mmHg 99 % -- -2 mmHg   05/12/24 0245 98.2 °F (36.8 °C) 100 12 -- -- 106/44 70 mmHg 99 % -- --   05/12/24 0236 98.2 °F (36.8 °C) 99 12 101/42 Abnormal  -- -- -- -- -- --   05/12/24 0200 98.2 °F (36.8 °C) 100 12 158/79 102 150/64 100 mmHg 99 % -- -2 mmHg   05/12/24 0139 97.9 °F  (36.6 °C) 100 12 -- -- 158/68 104 mmHg 99 % -- -2 mmHg   05/12/24 0118 97.5 °F (36.4 °C) -- -- -- -- -- -- -- -- --   05/12/24 0100 96.8 °F (36 °C) Abnormal  98 12 159/84 108 134/60 88 mmHg 98 % -- --   05/12/24 0000 96.1 °F (35.6 °C) Abnormal  80 12 131/78 95 126/58 86 mmHg 100 % -- --   05/11/24 2355 97.5 °F (36.4 °C) 80 11 Abnormal  134/80 95 128/58 88 mmHg 100 % -- --   05/11/24 2336 96.1 °F (35.6 °C) Abnormal  84 12 -- -- 132/62 92 mmHg 100 % -- --   05/11/24 2324 -- -- -- -- -- -- -- 100 % -- --   05/11/24 22:21:40 -- -- -- -- -- -- -- 100 % Ventilator --   05/11/24 2200 96.8 °F (36 °C) Abnormal  82 11 Abnormal  137/74 94 112/54 78 mmHg 100 % -- --   05/11/24 2100 97.2 °F (36.2 °C) Abnormal  88 12 -- -- 124/72 94 mmHg 100 % -- --   05/11/24 2030 97.2 °F (36.2 °C) Abnormal  86 11 Abnormal  145/78 -- 124/88 106 mmHg 100 % -- --   05/11/24 2000 96.8 °F (36 °C) Abnormal  88 11 Abnormal  -- -- 118/58 82 mmHg 100 % -- --   05/11/24 1900 96.8 °F (36 °C) Abnormal  86 12 -- -- 136/72 96 mmHg 100 % -- --   05/11/24 1800 96.8 °F (36 °C) Abnormal  86 12 145/78 94 142/72 98 mmHg 100 % -- --   05/11/24 1700 96.8 °F (36 °C) Abnormal  90 0 Abnormal   -- -- 120/72 92 mmHg 100 % -- --   Resp: pt off vent for apnea test at bedside at 05/11/24 1700   05/11/24 1615 98.5 °F (36.9 °C) -- -- -- -- -- -- -- -- --   05/11/24 1600 97.2 °F (36.2 °C) Abnormal  88 12 -- -- 126/68 92 mmHg 100 % Ventilator --   05/11/24 1500 97.2 °F (36.2 °C) Abnormal  86 12 -- -- 124/68 90 mmHg 100 % -- --   05/11/24 1400 97.2 °F (36.2 °C) Abnormal  86 14 -- -- 128/70 94 mmHg 100 % -- --   05/11/24 1300 96.8 °F (36 °C) Abnormal  84 14 119/80 93 114/66 86 mmHg 100 % -- --   05/11/24 1200 98.1 °F (36.7 °C) 84 14 111/77 87 110/64 82 mmHg 100 % Ventilator --   05/11/24 1146 -- -- -- -- -- -- -- 100 % -- --   05/11/24 1100 97.2 °F (36.2 °C) Abnormal  84 13 103/66 76 -- -- 100 % -- --   05/11/24 1000 96.8 °F (36 °C) Abnormal  90 13 125/72 83 -- -- 100 % -- --    05/11/24 0900 97.5 °F (36.4 °C) 96 14 -- -- 142/76 102 mmHg 100 % -- --   05/11/24 0800 97.9 °F (36.6 °C) 96 14 187/109 Abnormal  142 168/76 112 mmHg 100 % Ventilator --   05/11/24 0723 -- -- -- -- -- -- -- 100 % -- --   05/11/24 0700 97.5 °F (36.4 °C) 90 13 170/102 Abnormal  131 154/74 106 mmHg 100 % -- --   05/11/24 0600 97.5 °F (36.4 °C) 90 14 145/90 107 130/64 90 mmHg 100 % -- --   05/11/24 0500 97.9 °F (36.6 °C) 92 16 161/88 116 144/68 98 mmHg 100 % -- --   05/11/24 0427 97.9 °F (36.6 °C) 92 15 -- -- 130/68 90 mmHg 100 % -- --   05/11/24 0400 -- 90 13 138/75 101 132/68 92 mmHg 100 % -- --   05/11/24 0300 98.2 °F (36.8 °C) 88 14 141/87 104 126/62 88 mmHg 100 % -- --   05/11/24 0200 98.2 °F (36.8 °C) 88 14 146/85 105 132/64 92 mmHg 100 % -- --   05/11/24 0100 97.9 °F (36.6 °C) 88 14 150/90 109 142/68 98 mmHg 100 % -- --   05/11/24 0000 97.5 °F (36.4 °C) 86 14 144/91 106 132/66 92 mmHg 100 % -- --   05/10/24 2300 97.5 °F (36.4 °C) 86 13 145/91 105 124/66 88 mmHg 100 % -- --   05/10/24 2200 97.5 °F (36.4 °C) 94 15 -- -- 122/120 122 mmHg 100 % -- --   05/10/24 2100 97.5 °F (36.4 °C) 86 13 136/91 106 122/74 94 mmHg 100 % -- --   05/10/24 2001 97.5 °F (36.4 °C) 88 14 -- -- 102/64 80 mmHg 100 % -- --   05/10/24 2000 97.5 °F (36.4 °C) 88 14 114/84 94 104/66 82 mmHg 100 % -- --   05/10/24 1909 97.9 °F (36.6 °C) 90 14 67/50 Abnormal  55 Abnormal  64/42 52 mmHg Abnormal  100 % Ventilator --   05/10/24 1900 97.9 °F (36.6 °C) 96 14 165/97 123 159/91 117 mmHg 100 % Ventilator --   05/10/24 1837 97.9 °F (36.6 °C) 102 16 140/70 92 160/90 116 mmHg 100 % Ventilator --   05/10/24 1800 97.5 °F (36.4 °C) 104 14 104/54 75 100/60 76 mmHg 100 % Ventilator --   05/10/24 1715 98.2 °F (36.8 °C) 94 14 149/73 112 172/90 120 mmHg 100 % Ventilator --   05/10/24 1710 98.2 °F (36.8 °C) 86 14 70/52 Abnormal  58 Abnormal  94/58 70 mmHg 100 % Ventilator --   05/10/24 1705 98.6 °F (37 °C) 90 14 79/49 Abnormal  58 Abnormal  76/50 60 mmHg Abnormal   100 % Ventilator --   05/10/24 1700 98.6 °F (37 °C) -- -- -- -- -- -- -- -- --   05/10/24 1658 98.6 °F (37 °C) 112 Abnormal  16 -- -- 74/46 56 mmHg Abnormal  100 % Ventilator --   05/10/24 1655 98.6 °F (37 °C) 116 Abnormal  15 -- -- 88/56 66 mmHg 100 % Ventilator --   05/10/24 1645 99 °F (37.2 °C) 118 Abnormal  17 -- -- 148/80 100 mmHg 100 % Ventilator --   05/10/24 1630 98.6 °F (37 °C) 112 Abnormal  16 -- -- 150/82 102 mmHg 100 % Ventilator --   05/10/24 1600 97.2 °F (36.2 °C) Abnormal  102 14 -- -- 150/80 102 mmHg 100 % Ventilator --   05/10/24 1530 97.5 °F (36.4 °C) 66 14 -- -- 148/64 90 mmHg 100 % Ventilator --   05/10/24 1500 96.8 °F (36 °C) Abnormal  -- -- -- -- -- -- -- -- --   05/10/24 1458 96.4 °F (35.8 °C) Abnormal  58 15 -- -- 168/80 110 mmHg 100 % Ventilator --   05/10/24 1452 96.4 °F (35.8 °C) Abnormal  -- -- -- -- -- -- -- -- --   05/10/24 1430 96.1 °F (35.6 °C) Abnormal  50 Abnormal  15 -- -- 144/64 92 mmHg 100 % Ventilator --   05/10/24 1420 95.7 °F (35.4 °C) Abnormal  54 Abnormal  16 -- -- 140/62 88 mmHg 100 % Ventilator --   05/10/24 1400 95.4 °F (35.2 °C) Abnormal  56 14 -- -- 114/54 74 mmHg 99 % Ventilator --   05/10/24 1345 94.6 °F (34.8 °C) Abnormal  66 14 -- -- 104/54 70 mmHg 98 % Ventilator --   05/10/24 1335 93.6 °F (34.2 °C) Abnormal  54 Abnormal  14 -- -- 118/48 68 mmHg 100 % Ventilator --   05/10/24 1330 94 °F (34.4 °C) Abnormal  -- -- -- -- -- -- -- -- --     Pertinent Labs/Diagnostic Test Results:   CT chest wo contrast   Final Result by Gamal Alvarenga DO (05/12 0317)      Mild passive atelectasis, lungs otherwise appear grossly clear.      Measurements and other findings as above.            Workstation performed: [id number]         CT head wo contrast   Final Result by Pallav N Shah, MD (05/11 0740)      Status post decompressive craniectomy with persistent external herniation of the brain parenchyma and notable bulging of the duraplasty.      Redemonstrated effacement of  the basal cisterns and transtentorial as well as cerebellar herniation.      Worsening cerebral edema and mass effect with multifocal zones of cerebral ischemia and possibly hypoxic-ischemic injury.      Stable partially evacuated multifocal parenchymal and subarachnoid hemorrhage within the right cerebral hemisphere.      Persistent focal hemorrhage within the right medial thalamus with intraventricular extension.      Status post ACOM Web aneurysm embolization.         I personally discussed this study with Maddie Turner on 5/11/2024 7:08 AM.                                    Workstation performed: [id number]         XR chest portable ICU   Final Result by Irasema Ribera MD (05/11 2040)      Left subclavian catheter in lower SVC with no pneumothorax.            Workstation performed: [id number]         US bedside procedure   Final Result by Alden Rizzo (05/10 6127)      CT head wo contrast   Final Result by Sung Enriquez DO (05/10 5730)      Status post right hemispheric craniectomy.  The brain has herniated outside of the craniectomy margin, demonstrating scattered foci of intraparenchymal and subarachnoid hemorrhage.      Diffuse edema in the right hemisphere with loss of gray-white differentiation in the right temporal lobe, right frontal and parietal lobes as well as within the midbrain and ulysses. Findings suggestive of cytotoxic edema potentially representing    anoxia/infarct. Loss of CSF cisterns in the posterior fossa with displacement of the cerebellar tonsils compatible with downward transtentorial herniation.      Right frontoparietal parenchymal hematoma decreased in size status post evacuation. Sequela of recent surgery noted with gas within the residual hematoma.      Interval decrease in midline shift, currently 7 mm, previously 1.4 cm when remeasured with same technique.         I personally discussed this study with Inez GARCIA on 5/10/2024 3:56 PM.                         Workstation performed: [id number]         CT head wo contrast   Final Result by Delfina Friedman MD (05/10 1047)      Similar size of right frontotemporal hemorrhage. Stable leftward midline shift. Interval increased effacement of the basilar cisterns.      Dilated temporal horn of the left lateral ventricle suggest developing hydrocephalus.      Focal hyperdensity in the anterior midline ulysses/interpeduncular cistern region. While this may represent artifact, difficult to exclude small amount of subarachnoid hemorrhage.      Poor gray-white matter differentiation at the RIGHT temporoparietal region. Questionable hypodensity in the LEFT insular cortex region. Consider MRI for further evaluation.         Dr. SHAYY DELGADO notified of findings on 5/10/2024 at 10:43 AM.                           Workstation performed: [id number]         XR chest portable    (Results Pending)     Results from last 7 days   Lab Units 05/10/24  0742   SARS-COV-2  Negative     Results from last 7 days   Lab Units 05/12/24  0620 05/12/24  0025 05/12/24  0024 05/11/24 1701 05/11/24 1656 05/11/24 1632 05/11/24  1157 05/11/24  0433   WBC Thousand/uL 11.40*  --  9.57  --   --   --  11.64* 11.17*   HEMOGLOBIN g/dL 8.0*  --  5.7*  --   --   --  7.0* 7.8*   I STAT HEMOGLOBIN g/dl  --  5.4*  --  6.8* 6.5*   < >  --   --    HEMATOCRIT % 24.2*  --  18.3*  --   --   --  22.6* 25.0*   HEMATOCRIT, ISTAT %  --  16*  --  20* 19*   < >  --   --    PLATELETS Thousands/uL 141*  --  172  --   --   --  255 343   TOTAL NEUT ABS Thousands/µL  --   --  8.61*  --   --   --  9.54* 9.67*    < > = values in this interval not displayed.         Results from last 7 days   Lab Units 05/12/24  0025 05/12/24  0024 05/11/24  1701 05/11/24 1656 05/11/24  1649 05/11/24  1632 05/11/24  1157 05/11/24  0433 05/10/24  2305 05/10/24  1713 05/10/24  1413 05/10/24  1413   SODIUM mmol/L  --  145  --   --   --   --  147 148*  148* 145 148*  --  155*   POTASSIUM  "mmol/L  --  3.6  --   --   --   --  3.9 4.0  4.1 4.1 4.8  --  3.7   CHLORIDE mmol/L  --  116*  --   --   --   --  118* 117*  118* 117* 117*  --  128*   CO2 mmol/L  --  22  --   --   --   --  22 21  21 21 22  --  19*   CO2, I-STAT mmol/L 23  --  28 28 21 23  --   --   --   --   --   --    ANION GAP mmol/L  --  7  --   --   --   --  7 10  9 7 9  --  8   BUN mg/dL  --  11  --   --   --   --  9 9  9 9 10  --  12   CREATININE mg/dL  --  0.62  --   --   --   --  0.69 0.70  0.71 0.71 0.65  --  0.68   EGFR ml/min/1.73sq m  --  102  --   --   --   --  99 98  96 96 100  --  99   CALCIUM mg/dL  --  8.3*  --   --   --   --  8.1* 8.1*  7.9* 8.2* 7.5*  --  8.0*   CALCIUM, IONIZED mmol/L  --   --   --   --   --   --  1.09*  --   --  1.05*   < >  --    CALCIUM, IONIZED, ISTAT mmol/L 1.26  --  1.29 1.29 1.16 1.24  --   --   --   --   --   --    MAGNESIUM mg/dL  --   --   --   --   --   --  2.0 1.9  --   --   --  1.6*   PHOSPHORUS mg/dL  --   --   --   --   --   --  3.4 2.5*  --   --   --   --     < > = values in this interval not displayed.     Results from last 7 days   Lab Units 05/12/24  0024 05/11/24  1157 05/11/24  0433   AST U/L 14 18 20  20   ALT U/L 7 8 9  9   ALK PHOS U/L 40 45 49  49   TOTAL PROTEIN g/dL 5.7* 5.5* 5.8*  5.8*   ALBUMIN g/dL 3.5 3.2* 3.4*  3.4*   TOTAL BILIRUBIN mg/dL 0.39 0.36 0.44  0.44   BILIRUBIN DIRECT mg/dL 0.11  --  0.08     Results from last 7 days   Lab Units 05/12/24  0006 05/10/24  0723   POC GLUCOSE mg/dl 147* 193*     Results from last 7 days   Lab Units 05/12/24  0024 05/11/24  1157 05/11/24  0433 05/10/24  2305 05/10/24  1713 05/10/24  1413 05/10/24  0923 05/10/24  0742   GLUCOSE RANDOM mg/dL 144* 166* 180*  182* 200* 134 131 237* 236*     Results from last 7 days   Lab Units 05/11/24  1157 05/11/24  0433 05/10/24  2305 05/10/24  1413   OSMOLALITY, SERUM mmol/* 312* 313* 329*         No results found for: \"BETA-HYDROXYBUTYRATE\"   Results from last 7 days   Lab Units " 05/12/24  0620 05/12/24  0229 05/12/24  0025   PH ART  7.355 7.330* 7.366   PCO2 ART mm Hg 39.3 43.9 41.9   PO2 ART mm Hg 325.6* 376.9* 287.5*   HCO3 ART mmol/L 21.5* 22.6 23.5   BASE EXC ART mmol/L -3.7 -3.1 -1.7   O2 CONTENT ART mL/dL 12.8* 11.5* 9.1*   O2 HGB, ARTERIAL % 98.8* 98.7* 97.7*   ABG SOURCE  Line, Arterial Line, Arterial Line, Arterial     Results from last 7 days   Lab Units 05/10/24  0742   PH XOCHITL  7.263*   PCO2 XOCHITL mm Hg 48.7   PO2 XOCHITL mm Hg 43.3   HCO3 XOCHITL mmol/L 21.5*   BASE EXC XOCHITL mmol/L -5.7   O2 CONTENT XOCHITL ml/dL 14.6   O2 HGB, VENOUS % 73.0     Results from last 7 days   Lab Units 05/12/24  0025 05/11/24  1701 05/11/24  1656 05/11/24  1649 05/11/24  1632 05/10/24  1208   PH, XOCHITL I-STAT   --  7.159*  --   --   --   --    PCO2, XOCHITL ISTAT mm HG  --  72.6*  --   --   --   --    PO2, XOCHITL ISTAT mm HG  --  >400.0*  --   --   --   --    HCO3, XOCHITL ISTAT mmol/L  --  25.8  --   --   --   --    I STAT BASE EXC mmol/L -3* -3* -1 -5* -3* -11*   I STAT O2 SAT % 100*  --   --   --  99* 100*   ISTAT PH ART  7.342*  --  7.249* 7.368 7.364 7.316*   I STAT ART PCO2 mm HG 40.5  --  59.7* 34.8* 39.0 28.5*   I STAT ART PO2 mm .0*  --  >400.0* >400.0* 157.0* 254.0*   I STAT ART HCO3 mmol/L 22.0  --  26.1 20.0* 22.3 14.6*     Results from last 7 days   Lab Units 05/12/24  0024   CK TOTAL U/L 180     Results from last 7 days   Lab Units 05/12/24  0024 05/11/24  2017 05/10/24  0742   HS TNI 0HR ng/L  --  <2 <2   HS TNI 4HR ng/L 3  --   --    HSTNI D4 ng/L >1  --   --          Results from last 7 days   Lab Units 05/12/24  0620 05/12/24  0024 05/11/24  1157   PROTIME seconds 16.1* 16.1* 15.1*   INR  1.31* 1.31* 1.20*   PTT seconds 30 32 27         Results from last 7 days   Lab Units 05/10/24  1713 05/10/24  1413   LACTIC ACID mmol/L 3.3* 4.5*         Results from last 7 days   Lab Units 05/12/24  0555 05/12/24  0102 05/11/24  0555   UNIT PRODUCT CODE  [id number]  A7429O15  N0292H96  D4549U12 T4393K51  G7460C04  I8592J52  W5103G11  G4232K33  D4038F66  R8274X53  L9523E03   UNIT NUMBER  [id number]  [id number]  [id number]  [id number]  [id number]  [id number]  [id number]  [id number]  [id number]  [id number]   UNITABO  A  A  A  A A  A O  O  AB  B  A  A   UNITRH  POS  POS  POS  POS POS  POS POS  POS  POS  POS  POS  POS   CROSSMATCH  Compatible  Compatible  Compatible  Compatible Compatible  Compatible  --    UNIT DISPENSE STATUS  Presumed Trans  Presumed Trans  Crossmatched  Presumed Trans Crossmatched  Crossmatched Presumed Trans  Presumed Trans  Presumed Trans  Presumed Trans  Presumed Trans  Presumed Trans   UNIT PRODUCT VOL mL 350  350  350  350 350  350 300  300  260  300  300  300         Results from last 7 days   Lab Units 05/12/24  0024   LIPASE u/L 13   AMYLASE IU/L 45     Results from last 7 days   Lab Units 05/11/24  1157 05/11/24  0433 05/10/24  2305 05/10/24  1413   OSMOLALITY, SERUM mmol/* 312* 313* 329*     Results from last 7 days   Lab Units 05/11/24  2017   CLARITY UA  Clear   COLOR UA  Light Yellow   SPEC GRAV UA  1.021   PH UA  5.5   GLUCOSE UA mg/dl Negative   KETONES UA mg/dl Negative   BLOOD UA  Negative   PROTEIN UA mg/dl Trace*   NITRITE UA  Negative   BILIRUBIN UA  Negative   UROBILINOGEN UA (BE) mg/dl <2.0   LEUKOCYTES UA  Negative   WBC UA /hpf 1-2   RBC UA /hpf None Seen   BACTERIA UA /hpf None Seen   EPITHELIAL CELLS WET PREP /hpf Occasional   MUCUS THREADS  Occasional*     Results from last 7 days   Lab Units 05/10/24  0742   INFLUENZA A PCR  Negative   INFLUENZA B PCR  Negative   RSV PCR  Negative       ED Treatment:   Medication Administration - No Administrations Displayed (No Start Event Found)       None          Past Medical History:   Diagnosis Date    Abnormal Pap smear of cervix     Anemia     Arthritis     Asthma     Colon polyp     History of vertigo      Hyperlipemia     triglycerides    Hypertriglyceridemia     Hypothyroidism     Impaired fasting glucose     Insomnia     Lab test positive for detection of COVID-19 virus 12/22/2021    Premenstrual tension syndrome     Tendonitis      Present on Admission:   Right-sided nontraumatic intracerebral hemorrhage (HCC)   Anterior communicating artery aneurysm      Admitting Diagnosis: Stroke (HCC) [I63.9]  Age/Sex: 53 y.o. female  Admission Orders:  Scheduled Medications:  acetaminophen, 975 mg, Oral, Q8H JOE  azithromycin, 500 mg, Intravenous, Q24H  cefazolin, 2,000 mg, Intravenous, Q8H  cefTAZidime, 2,000 mg, Intravenous, Q8H  chlorhexidine, 15 mL, Mouth/Throat, Q12H JOE  fentaNYL, 100 mcg, Intravenous, Once  hydrocortisone sodium succinate, 50 mg, Intravenous, Q6H JOE  methylPREDNISolone sodium succinate, 1,000 mg, Intravenous, Q6H JOE  midazolam, 4 mg, Intravenous, Once  polyethylene glycol, 17 g, Oral, Daily  senna, 1 tablet, Oral, Daily  vancomycin, 1,000 mg, Intravenous, Q12H      Continuous IV Infusions:  Levothyroxine Sodium 200 mcg in sodium chloride 0.9 % 500 mL IVPB, 40 mcg/hr, Intravenous, Titrated  phenylephine,  mcg/min, Intravenous, Titrated  sodium chloride, 150 mL/hr, Intravenous, Continuous  vasopressin, 0.04 Units/min, Intravenous, Continuous      PRN Meds:  albuterol, 2.5 mg, Nebulization, Q4H PRN  bisacodyl, 10 mg, Rectal, Daily PRN          Network Utilization Review Department  ATTENTION: Please call with any questions or concerns to [phone] and carefully listen to the prompts so that you are directed to the right person. All voicemails are confidential.   For Discharge needs, contact Care Management DC Support Team at [phone] opt. 2  Send all requests for admission clinical reviews, approved or denied determinations and any other requests to dedicated fax number below belonging to the campus where the patient is receiving treatment. List of dedicated fax numbers for the  Facilities:  FACILITY NAME UR FAX NUMBER   ADMISSION DENIALS (Administrative/Medical Necessity) [phone]   DISCHARGE SUPPORT TEAM (NETWORK) [phone]   PARENT CHILD HEALTH (Maternity/NICU/Pediatrics) [phone]   [facility] [phone]   [facility] [phone]   [facility] [phone]   [facility] [phone]   [facility] [phone]   [facility] [phone]   [facility] [phone]   [facility] [phone]   [facility] [phone]   [facility] [phone]   [facility] [phone]   [facility] [phone]

## 2024-05-12 NOTE — PROGRESS NOTES
"Progress Note - Neurosurgery   Aleah Newton 53 y.o. female MRN: [id number]  Unit/Bed#: ICU 09 Encounter: [id number]    Assessment:  53F who presented as stroke alert with R ICH, s/p R hemicrani 5/10    Plan:  Neuro exam very poor. Flap is very hard and when MORALES drain removed brain tissue was extruding from MORALES site due to extremely high ICP. Overall exam is consistent with brain death. Recommend formal brain death testing when able and goals of care. No further neurosurgical intervention can be offered.       Subjective/Objective     Subjective: germain    Objective:     I/O         05/10 0701 05/11 0700 05/11 0701 05/12 0700    I.V. (mL/kg) 5499.8 595.4 (6.8)    Blood 1550     NG/ 30    IV Piggyback 400 50    Total Intake(mL/kg) 7579.8 675.4 (7.8)    Urine (mL/kg/hr) 4200 1030 (0.8)    Emesis/NG output 300 0    Drains 95     Blood 400     Total Output 4995 1030    Net +2584.8 -354.6                  Invasive Devices       Central Venous Catheter Line  Duration             CVC Central Lines 05/10/24 Triple 20cm 1 day              Peripheral Intravenous Line  Duration             Peripheral IV 05/10/24 Dorsal (posterior);Right Wrist 1 day    Peripheral IV 05/10/24 Left;Proximal;Ventral (anterior) Forearm 1 day              Arterial Line  Duration             Arterial Line 05/10/24 Left Radial 1 day              Drain  Duration             NG/OG/Enteral Tube Nasogastric 16 Fr Right nare 1 day    Urethral Catheter 1 day              Airway  Duration             ETT  Oral 7.5 mm 1 day                    Physical Exam:  Vitals: Blood pressure 145/78, pulse 88, temperature (!) 97.2 °F (36.2 °C), resp. rate 12, height 5' 6\" (1.676 m), weight 87.1 kg (192 lb), last menstrual period 09/12/2022, SpO2 100%.,Body mass index is 30.99 kg/m².    Lying in bed  Intubated  Not sedated  Pupils 7mm, fixed bilaterally  No cg  No corneal  No gag  Does not move extremities to voice/pain      Lab Results:  Results from last 7 days " "  Lab Units 05/11/24  1701 05/11/24  1656 05/11/24  1649 05/11/24  1632 05/11/24  1157 05/11/24  0433 05/10/24  1413   WBC Thousand/uL  --   --   --   --  11.64* 11.17* 19.37*   HEMOGLOBIN g/dL  --   --   --   --  7.0* 7.8* 8.6*   I STAT HEMOGLOBIN g/dl 6.8* 6.5* 5.8*   < >  --   --   --    HEMATOCRIT %  --   --   --   --  22.6* 25.0* 26.6*   HEMATOCRIT, ISTAT % 20* 19* 17*   < >  --   --   --    PLATELETS Thousands/uL  --   --   --   --  255 343 426*   SEGS PCT %  --   --   --   --  82* 87* 86*   MONO PCT %  --   --   --   --  8 6 5   EOS PCT %  --   --   --   --  0 0 0    < > = values in this interval not displayed.     Results from last 7 days   Lab Units 05/11/24  1701 05/11/24  1656 05/11/24  1649 05/11/24  1632 05/11/24  1157 05/11/24  0433 05/10/24  2305   POTASSIUM mmol/L  --   --   --   --  3.9 4.0  4.1 4.1   CHLORIDE mmol/L  --   --   --   --  118* 117*  118* 117*   CO2 mmol/L  --   --   --   --  22 21  21 21   CO2, I-STAT mmol/L 28 28 21   < >  --   --   --    BUN mg/dL  --   --   --   --  9 9  9 9   CREATININE mg/dL  --   --   --   --  0.69 0.70  0.71 0.71   CALCIUM mg/dL  --   --   --   --  8.1* 8.1*  7.9* 8.2*   ALK PHOS U/L  --   --   --   --  45 49  49  --    ALT U/L  --   --   --   --  8 9  9  --    AST U/L  --   --   --   --  18 20  20  --    GLUCOSE, ISTAT mg/dl 171* 173* 154*   < >  --   --   --     < > = values in this interval not displayed.     Results from last 7 days   Lab Units 05/11/24  1157 05/11/24  0433 05/10/24  1413   MAGNESIUM mg/dL 2.0 1.9 1.6*     Results from last 7 days   Lab Units 05/11/24  1157 05/11/24  0433   PHOSPHORUS mg/dL 3.4 2.5*     Results from last 7 days   Lab Units 05/11/24  1157 05/11/24  0433 05/10/24  0923   INR  1.20* 1.16 1.04   PTT seconds 27 29 26     No results found for: \"TROPONINT\"  ABG:  Lab Results   Component Value Date    PHART 7.386 05/11/2024    QSX2DKO 25.8 (L) 05/11/2024    PO2ART 157.2 (H) 05/11/2024    WCJ1VAM 15.1 (L) 05/11/2024    " BEART -9.2 05/11/2024    SOURCE Line, Arterial 05/11/2024       Imaging Studies: I have personally reviewed pertinent reports.   and I have personally reviewed pertinent films in PACS    EKG, Pathology, and Other Studies: I have personally reviewed pertinent reports.

## 2024-05-13 ENCOUNTER — APPOINTMENT (INPATIENT)
Dept: RADIOLOGY | Facility: HOSPITAL | Age: 54
DRG: 023 | End: 2024-05-13
Payer: COMMERCIAL

## 2024-05-13 VITALS
HEART RATE: 104 BPM | SYSTOLIC BLOOD PRESSURE: 184 MMHG | RESPIRATION RATE: 13 BRPM | WEIGHT: 192 LBS | DIASTOLIC BLOOD PRESSURE: 81 MMHG | HEIGHT: 66 IN | OXYGEN SATURATION: 98 % | TEMPERATURE: 98.2 F | BODY MASS INDEX: 30.86 KG/M2

## 2024-05-13 LAB
ALBUMIN SERPL BCP-MCNC: 3.1 G/DL (ref 3.5–5)
ALBUMIN SERPL BCP-MCNC: 3.5 G/DL (ref 3.5–5)
ALP SERPL-CCNC: 43 U/L (ref 34–104)
ALP SERPL-CCNC: 43 U/L (ref 34–104)
ALT SERPL W P-5'-P-CCNC: 6 U/L (ref 7–52)
ALT SERPL W P-5'-P-CCNC: 6 U/L (ref 7–52)
AMYLASE SERPL-CCNC: 230 IU/L (ref 29–103)
AMYLASE SERPL-CCNC: 231 IU/L (ref 29–103)
ANION GAP SERPL CALCULATED.3IONS-SCNC: 11 MMOL/L (ref 4–13)
ANION GAP SERPL CALCULATED.3IONS-SCNC: 8 MMOL/L (ref 4–13)
APTT PPP: 30 SECONDS (ref 23–37)
APTT PPP: 30 SECONDS (ref 23–37)
AST SERPL W P-5'-P-CCNC: 16 U/L (ref 13–39)
AST SERPL W P-5'-P-CCNC: 16 U/L (ref 13–39)
BACTERIA UR QL AUTO: ABNORMAL /HPF
BASE EXCESS BLDA CALC-SCNC: -0.5 MMOL/L
BASE EXCESS BLDA CALC-SCNC: -2.7 MMOL/L
BASE EXCESS BLDA CALC-SCNC: -3.4 MMOL/L
BASE EXCESS BLDA CALC-SCNC: -8 MMOL/L (ref -2–3)
BASE EXCESS BLDA CALC-SCNC: -8 MMOL/L (ref -2–3)
BASE EXCESS BLDA CALC-SCNC: -9 MMOL/L (ref -2–3)
BASOPHILS # BLD MANUAL: 0 THOUSAND/UL (ref 0–0.1)
BASOPHILS NFR MAR MANUAL: 0 % (ref 0–1)
BILIRUB DIRECT SERPL-MCNC: 0.1 MG/DL (ref 0–0.2)
BILIRUB SERPL-MCNC: 0.43 MG/DL (ref 0.2–1)
BILIRUB SERPL-MCNC: 0.51 MG/DL (ref 0.2–1)
BILIRUB UR QL STRIP: NEGATIVE
BODY TEMPERATURE: 97.5 DEGREES FEHRENHEIT
BODY TEMPERATURE: 97.5 DEGREES FEHRENHEIT
BODY TEMPERATURE: 99.5 DEGREES FEHRENHEIT
BUN SERPL-MCNC: 12 MG/DL (ref 5–25)
BUN SERPL-MCNC: 13 MG/DL (ref 5–25)
CA-I BLD-SCNC: 0.97 MMOL/L (ref 1.12–1.32)
CA-I BLD-SCNC: 1.1 MMOL/L (ref 1.12–1.32)
CA-I BLD-SCNC: 1.24 MMOL/L (ref 1.12–1.32)
CALCIUM ALBUM COR SERPL-MCNC: 8.4 MG/DL (ref 8.3–10.1)
CALCIUM SERPL-MCNC: 7.7 MG/DL (ref 8.4–10.2)
CALCIUM SERPL-MCNC: 8.2 MG/DL (ref 8.4–10.2)
CHLORIDE SERPL-SCNC: 117 MMOL/L (ref 96–108)
CHLORIDE SERPL-SCNC: 118 MMOL/L (ref 96–108)
CK SERPL-CCNC: 190 U/L (ref 26–192)
CK SERPL-CCNC: 194 U/L (ref 26–192)
CLARITY UR: CLEAR
CO2 SERPL-SCNC: 23 MMOL/L (ref 21–32)
CO2 SERPL-SCNC: 23 MMOL/L (ref 21–32)
COLOR UR: ABNORMAL
CREAT SERPL-MCNC: 0.61 MG/DL (ref 0.6–1.3)
CREAT SERPL-MCNC: 0.86 MG/DL (ref 0.6–1.3)
EOSINOPHIL # BLD MANUAL: 0 THOUSAND/UL (ref 0–0.4)
EOSINOPHIL NFR BLD MANUAL: 0 % (ref 0–6)
ERYTHROCYTE [DISTWIDTH] IN BLOOD BY AUTOMATED COUNT: 17.3 % (ref 11.6–15.1)
ERYTHROCYTE [DISTWIDTH] IN BLOOD BY AUTOMATED COUNT: 17.4 % (ref 11.6–15.1)
EST. AVERAGE GLUCOSE BLD GHB EST-MCNC: 111 MG/DL
GFR SERPL CREATININE-BSD FRML MDRD: 103 ML/MIN/1.73SQ M
GFR SERPL CREATININE-BSD FRML MDRD: 76 ML/MIN/1.73SQ M
GGT SERPL-CCNC: 7 U/L (ref 9–64)
GGT SERPL-CCNC: 8 U/L (ref 9–64)
GLUCOSE SERPL-MCNC: 165 MG/DL (ref 65–140)
GLUCOSE SERPL-MCNC: 188 MG/DL (ref 65–140)
GLUCOSE SERPL-MCNC: 230 MG/DL (ref 65–140)
GLUCOSE SERPL-MCNC: 230 MG/DL (ref 65–140)
GLUCOSE SERPL-MCNC: 276 MG/DL (ref 65–140)
GLUCOSE UR STRIP-MCNC: NEGATIVE MG/DL
HBA1C MFR BLD: 5.5 %
HCO3 BLDA-SCNC: 15.9 MMOL/L (ref 22–28)
HCO3 BLDA-SCNC: 15.9 MMOL/L (ref 22–28)
HCO3 BLDA-SCNC: 19.3 MMOL/L (ref 22–28)
HCO3 BLDA-SCNC: 21.7 MMOL/L (ref 22–28)
HCO3 BLDA-SCNC: 21.8 MMOL/L (ref 22–28)
HCO3 BLDA-SCNC: 23.8 MMOL/L (ref 22–28)
HCT VFR BLD AUTO: 24.8 % (ref 34.8–46.1)
HCT VFR BLD AUTO: 25 % (ref 34.8–46.1)
HCT VFR BLD CALC: 23 % (ref 34.8–46.1)
HCT VFR BLD CALC: 30 % (ref 34.8–46.1)
HCT VFR BLD CALC: 35 % (ref 34.8–46.1)
HGB BLD-MCNC: 8 G/DL (ref 11.5–15.4)
HGB BLD-MCNC: 8.4 G/DL (ref 11.5–15.4)
HGB BLDA-MCNC: 10.2 G/DL (ref 11.5–15.4)
HGB BLDA-MCNC: 11.9 G/DL (ref 11.5–15.4)
HGB BLDA-MCNC: 7.8 G/DL (ref 11.5–15.4)
HGB UR QL STRIP.AUTO: ABNORMAL
HOROWITZ INDEX BLDA+IHG-RTO: 100 MM[HG]
INR PPP: 1.31 (ref 0.84–1.19)
INR PPP: 1.34 (ref 0.84–1.19)
KETONES UR STRIP-MCNC: NEGATIVE MG/DL
LEUKOCYTE ESTERASE UR QL STRIP: NEGATIVE
LIPASE SERPL-CCNC: 10 U/L (ref 11–82)
LIPASE SERPL-CCNC: 12 U/L (ref 11–82)
LYMPHOCYTES # BLD AUTO: 0.65 THOUSAND/UL (ref 0.6–4.47)
LYMPHOCYTES # BLD AUTO: 5 % (ref 14–44)
MAGNESIUM SERPL-MCNC: 2 MG/DL (ref 1.9–2.7)
MCH RBC QN AUTO: 27.9 PG (ref 26.8–34.3)
MCH RBC QN AUTO: 28.5 PG (ref 26.8–34.3)
MCHC RBC AUTO-ENTMCNC: 32.3 G/DL (ref 31.4–37.4)
MCHC RBC AUTO-ENTMCNC: 33.6 G/DL (ref 31.4–37.4)
MCV RBC AUTO: 85 FL (ref 82–98)
MCV RBC AUTO: 86 FL (ref 82–98)
MONOCYTES # BLD AUTO: 0.39 THOUSAND/UL (ref 0–1.22)
MONOCYTES NFR BLD: 3 % (ref 4–12)
NEUTROPHILS # BLD MANUAL: 11.9 THOUSAND/UL (ref 1.85–7.62)
NEUTS BAND NFR BLD MANUAL: 3 % (ref 0–8)
NEUTS SEG NFR BLD AUTO: 89 % (ref 43–75)
NITRITE UR QL STRIP: NEGATIVE
NON-SQ EPI CELLS URNS QL MICRO: ABNORMAL /HPF
NRBC BLD AUTO-RTO: 0 /100 WBCS
O2 CT BLDA-SCNC: 12.5 ML/DL (ref 16–23)
O2 CT BLDA-SCNC: 12.7 ML/DL (ref 16–23)
O2 CT BLDA-SCNC: 13.2 ML/DL (ref 16–23)
OXYHGB MFR BLDA: 98.6 % (ref 94–97)
OXYHGB MFR BLDA: 98.9 % (ref 94–97)
OXYHGB MFR BLDA: 99.2 % (ref 94–97)
PCO2 BLD: 17 MMOL/L (ref 21–32)
PCO2 BLD: 17 MMOL/L (ref 21–32)
PCO2 BLD: 21 MMOL/L (ref 21–32)
PCO2 BLD: 27.4 MM HG (ref 36–44)
PCO2 BLD: 30.9 MM HG (ref 36–44)
PCO2 BLD: 43.7 MM HG (ref 36–44)
PCO2 BLDA: 36.4 MM HG (ref 36–44)
PCO2 BLDA: 37.4 MM HG (ref 36–44)
PCO2 BLDA: 39.2 MM HG (ref 36–44)
PCO2 TEMP ADJ BLDA: 35.5 MM HG (ref 36–44)
PCO2 TEMP ADJ BLDA: 36.4 MM HG (ref 36–44)
PEEP RESPIRATORY: 5 CM[H2O]
PH BLD: 7.25 [PH] (ref 7.35–7.45)
PH BLD: 7.32 [PH] (ref 7.35–7.45)
PH BLD: 7.37 [PH] (ref 7.35–7.45)
PH BLD: 7.41 [PH] (ref 7.35–7.45)
PH BLD: 7.43 [PH] (ref 7.35–7.45)
PH BLDA: 7.36 [PH] (ref 7.35–7.45)
PH BLDA: 7.4 [PH] (ref 7.35–7.45)
PH BLDA: 7.42 [PH] (ref 7.35–7.45)
PH UR STRIP.AUTO: 6 [PH]
PHOSPHATE SERPL-MCNC: 1.9 MG/DL (ref 2.7–4.5)
PLATELET # BLD AUTO: 125 THOUSANDS/UL (ref 149–390)
PLATELET # BLD AUTO: 131 THOUSANDS/UL (ref 149–390)
PLATELET BLD QL SMEAR: ABNORMAL
PMV BLD AUTO: 11.4 FL (ref 8.9–12.7)
PMV BLD AUTO: 11.6 FL (ref 8.9–12.7)
PO2 BLD: 214 MM HG (ref 75–129)
PO2 BLD: 289.1 MM HG (ref 75–129)
PO2 BLD: 304.8 MM HG (ref 75–129)
PO2 BLD: >400 MM HG (ref 75–129)
PO2 BLD: >400 MM HG (ref 75–129)
PO2 BLDA: 292 MM HG (ref 75–129)
PO2 BLDA: 295.7 MM HG (ref 75–129)
PO2 BLDA: 307.7 MM HG (ref 75–129)
POTASSIUM BLD-SCNC: 2.6 MMOL/L (ref 3.5–5.3)
POTASSIUM BLD-SCNC: 3 MMOL/L (ref 3.5–5.3)
POTASSIUM BLD-SCNC: 3.7 MMOL/L (ref 3.5–5.3)
POTASSIUM SERPL-SCNC: 3.2 MMOL/L (ref 3.5–5.3)
POTASSIUM SERPL-SCNC: 3.6 MMOL/L (ref 3.5–5.3)
PROT SERPL-MCNC: 5 G/DL (ref 6.4–8.4)
PROT SERPL-MCNC: 5.3 G/DL (ref 6.4–8.4)
PROT UR STRIP-MCNC: ABNORMAL MG/DL
PROTHROMBIN TIME: 16.1 SECONDS (ref 11.6–14.5)
PROTHROMBIN TIME: 16.4 SECONDS (ref 11.6–14.5)
RBC # BLD AUTO: 2.87 MILLION/UL (ref 3.81–5.12)
RBC # BLD AUTO: 2.95 MILLION/UL (ref 3.81–5.12)
RBC #/AREA URNS AUTO: ABNORMAL /HPF
RBC MORPH BLD: NORMAL
SAO2 % BLD FROM PO2: 100 % (ref 60–85)
SODIUM BLD-SCNC: 146 MMOL/L (ref 136–145)
SODIUM BLD-SCNC: 146 MMOL/L (ref 136–145)
SODIUM BLD-SCNC: 155 MMOL/L (ref 136–145)
SODIUM SERPL-SCNC: 148 MMOL/L (ref 135–147)
SODIUM SERPL-SCNC: 152 MMOL/L (ref 135–147)
SP GR UR STRIP.AUTO: 1.01 (ref 1–1.03)
SPECIMEN SOURCE: ABNORMAL
UROBILINOGEN UR STRIP-ACNC: <2 MG/DL
VENT AC: 14
VENT AC: 18
VENT AC: 18
VENT- AC: AC
VT SETTING VENT: 330 ML
VT SETTING VENT: 450 ML
VT SETTING VENT: 450 ML
WBC # BLD AUTO: 12.45 THOUSAND/UL (ref 4.31–10.16)
WBC # BLD AUTO: 12.93 THOUSAND/UL (ref 4.31–10.16)
WBC #/AREA URNS AUTO: ABNORMAL /HPF

## 2024-05-13 PROCEDURE — 94760 N-INVAS EAR/PLS OXIMETRY 1: CPT

## 2024-05-13 PROCEDURE — 94664 DEMO&/EVAL PT USE INHALER: CPT

## 2024-05-13 PROCEDURE — 83735 ASSAY OF MAGNESIUM: CPT | Performed by: REGISTERED NURSE

## 2024-05-13 PROCEDURE — 82150 ASSAY OF AMYLASE: CPT | Performed by: REGISTERED NURSE

## 2024-05-13 PROCEDURE — 83615 LACTATE (LD) (LDH) ENZYME: CPT | Performed by: EMERGENCY MEDICINE

## 2024-05-13 PROCEDURE — 84295 ASSAY OF SERUM SODIUM: CPT

## 2024-05-13 PROCEDURE — 85610 PROTHROMBIN TIME: CPT | Performed by: REGISTERED NURSE

## 2024-05-13 PROCEDURE — 80053 COMPREHEN METABOLIC PANEL: CPT | Performed by: REGISTERED NURSE

## 2024-05-13 PROCEDURE — 82550 ASSAY OF CK (CPK): CPT | Performed by: REGISTERED NURSE

## 2024-05-13 PROCEDURE — 82805 BLOOD GASES W/O2 SATURATION: CPT | Performed by: REGISTERED NURSE

## 2024-05-13 PROCEDURE — 94003 VENT MGMT INPAT SUBQ DAY: CPT

## 2024-05-13 PROCEDURE — 84132 ASSAY OF SERUM POTASSIUM: CPT

## 2024-05-13 PROCEDURE — 83625 ASSAY OF LDH ENZYMES: CPT | Performed by: EMERGENCY MEDICINE

## 2024-05-13 PROCEDURE — NC001 PR NO CHARGE: Performed by: EMERGENCY MEDICINE

## 2024-05-13 PROCEDURE — 84100 ASSAY OF PHOSPHORUS: CPT | Performed by: REGISTERED NURSE

## 2024-05-13 PROCEDURE — 83690 ASSAY OF LIPASE: CPT | Performed by: REGISTERED NURSE

## 2024-05-13 PROCEDURE — 82330 ASSAY OF CALCIUM: CPT

## 2024-05-13 PROCEDURE — 85014 HEMATOCRIT: CPT

## 2024-05-13 PROCEDURE — P9016 RBC LEUKOCYTES REDUCED: HCPCS

## 2024-05-13 PROCEDURE — 81001 URINALYSIS AUTO W/SCOPE: CPT | Performed by: REGISTERED NURSE

## 2024-05-13 PROCEDURE — 85007 BL SMEAR W/DIFF WBC COUNT: CPT | Performed by: REGISTERED NURSE

## 2024-05-13 PROCEDURE — 85027 COMPLETE CBC AUTOMATED: CPT | Performed by: REGISTERED NURSE

## 2024-05-13 PROCEDURE — 85730 THROMBOPLASTIN TIME PARTIAL: CPT | Performed by: REGISTERED NURSE

## 2024-05-13 PROCEDURE — 82977 ASSAY OF GGT: CPT | Performed by: EMERGENCY MEDICINE

## 2024-05-13 PROCEDURE — 82947 ASSAY GLUCOSE BLOOD QUANT: CPT

## 2024-05-13 PROCEDURE — 82803 BLOOD GASES ANY COMBINATION: CPT

## 2024-05-13 PROCEDURE — 82248 BILIRUBIN DIRECT: CPT | Performed by: REGISTERED NURSE

## 2024-05-13 PROCEDURE — 71045 X-RAY EXAM CHEST 1 VIEW: CPT

## 2024-05-13 RX ORDER — CEFAZOLIN SODIUM 1 G/3ML
INJECTION, POWDER, FOR SOLUTION INTRAMUSCULAR; INTRAVENOUS AS NEEDED
Status: DISCONTINUED | OUTPATIENT
Start: 2024-05-13 | End: 2024-05-13

## 2024-05-13 RX ORDER — HYDRALAZINE HYDROCHLORIDE 20 MG/ML
INJECTION INTRAMUSCULAR; INTRAVENOUS AS NEEDED
Status: DISCONTINUED | OUTPATIENT
Start: 2024-05-13 | End: 2024-05-13

## 2024-05-13 RX ORDER — MAGNESIUM SULFATE HEPTAHYDRATE 40 MG/ML
INJECTION, SOLUTION INTRAVENOUS AS NEEDED
Status: DISCONTINUED | OUTPATIENT
Start: 2024-05-13 | End: 2024-05-13

## 2024-05-13 RX ORDER — AMPHOTERICIN B 50 MG/10ML
50 INJECTION, POWDER, LYOPHILIZED, FOR SOLUTION INTRAVENOUS ONCE
Qty: 50 MG | Refills: 0 | Status: DISCONTINUED | OUTPATIENT
Start: 2024-05-13 | End: 2024-05-13 | Stop reason: HOSPADM

## 2024-05-13 RX ORDER — HEPARIN SODIUM 1000 [USP'U]/ML
30000 INJECTION, SOLUTION INTRAVENOUS; SUBCUTANEOUS ONCE
Status: DISCONTINUED | OUTPATIENT
Start: 2024-05-13 | End: 2024-05-13 | Stop reason: HOSPADM

## 2024-05-13 RX ORDER — ROCURONIUM BROMIDE 10 MG/ML
INJECTION, SOLUTION INTRAVENOUS AS NEEDED
Status: DISCONTINUED | OUTPATIENT
Start: 2024-05-13 | End: 2024-05-13

## 2024-05-13 RX ORDER — SODIUM CHLORIDE 9 MG/ML
INJECTION, SOLUTION INTRAVENOUS CONTINUOUS PRN
Status: DISCONTINUED | OUTPATIENT
Start: 2024-05-13 | End: 2024-05-13

## 2024-05-13 RX ORDER — HEPARIN SODIUM 1000 [USP'U]/ML
INJECTION, SOLUTION INTRAVENOUS; SUBCUTANEOUS AS NEEDED
Status: DISCONTINUED | OUTPATIENT
Start: 2024-05-13 | End: 2024-05-13

## 2024-05-13 RX ORDER — PHENYLEPHRINE HCL IN 0.9% NACL 1 MG/10 ML
SYRINGE (ML) INTRAVENOUS AS NEEDED
Status: DISCONTINUED | OUTPATIENT
Start: 2024-05-13 | End: 2024-05-13

## 2024-05-13 RX ORDER — AMPHOTERICIN B 50 MG/10ML
INJECTION, POWDER, LYOPHILIZED, FOR SOLUTION INTRAVENOUS AS NEEDED
Status: DISCONTINUED | OUTPATIENT
Start: 2024-05-13 | End: 2024-05-13

## 2024-05-13 RX ORDER — CEFAZOLIN 1 G/1
1 INJECTION, POWDER, FOR SOLUTION INTRAVENOUS ONCE
Qty: 1000 MG | Refills: 0 | Status: DISCONTINUED | OUTPATIENT
Start: 2024-05-13 | End: 2024-05-13 | Stop reason: HOSPADM

## 2024-05-13 RX ORDER — POTASSIUM CHLORIDE 20MEQ/15ML
40 LIQUID (ML) ORAL ONCE
Status: COMPLETED | OUTPATIENT
Start: 2024-05-13 | End: 2024-05-13

## 2024-05-13 RX ORDER — DEXTROSE MONOHYDRATE 50 MG/ML
200 INJECTION, SOLUTION INTRAVENOUS CONTINUOUS
Status: DISCONTINUED | OUTPATIENT
Start: 2024-05-13 | End: 2024-05-13 | Stop reason: HOSPADM

## 2024-05-13 RX ORDER — CEFAZOLIN SODIUM 1 G/50ML
1000 SOLUTION INTRAVENOUS ONCE
Status: DISCONTINUED | OUTPATIENT
Start: 2024-05-13 | End: 2024-05-13

## 2024-05-13 RX ORDER — FUROSEMIDE 10 MG/ML
20 INJECTION INTRAMUSCULAR; INTRAVENOUS ONCE
Status: COMPLETED | OUTPATIENT
Start: 2024-05-13 | End: 2024-05-13

## 2024-05-13 RX ORDER — ALBUMIN (HUMAN) 12.5 G/50ML
25 SOLUTION INTRAVENOUS ONCE
Status: COMPLETED | OUTPATIENT
Start: 2024-05-13 | End: 2024-05-13

## 2024-05-13 RX ADMIN — CEFTAZIDIME 2000 MG: 1 INJECTION, POWDER, FOR SOLUTION INTRAMUSCULAR; INTRAVENOUS at 03:50

## 2024-05-13 RX ADMIN — HEPARIN SODIUM 30000 UNITS: 1000 INJECTION INTRAVENOUS; SUBCUTANEOUS at 14:38

## 2024-05-13 RX ADMIN — HYDROCORTISONE SODIUM SUCCINATE 50 MG: 100 INJECTION, POWDER, FOR SOLUTION INTRAMUSCULAR; INTRAVENOUS at 11:13

## 2024-05-13 RX ADMIN — Medication 400 MCG: at 14:54

## 2024-05-13 RX ADMIN — CEFAZOLIN SODIUM 2000 MG: 2 SOLUTION INTRAVENOUS at 03:13

## 2024-05-13 RX ADMIN — FUROSEMIDE 20 MG: 10 INJECTION, SOLUTION INTRAMUSCULAR; INTRAVENOUS at 02:32

## 2024-05-13 RX ADMIN — LEVOTHYROXINE SODIUM ANHYDROUS 20 MCG/HR: 100 INJECTION, POWDER, LYOPHILIZED, FOR SOLUTION INTRAVENOUS at 11:56

## 2024-05-13 RX ADMIN — HYDRALAZINE HYDROCHLORIDE 10 MG: 20 INJECTION INTRAMUSCULAR; INTRAVENOUS at 12:56

## 2024-05-13 RX ADMIN — Medication 400 MCG: at 14:36

## 2024-05-13 RX ADMIN — POTASSIUM CHLORIDE 40 MEQ: 1.5 SOLUTION ORAL at 03:12

## 2024-05-13 RX ADMIN — SODIUM CHLORIDE 100 ML/HR: 4 INJECTION, SOLUTION, CONCENTRATE INTRAVENOUS at 02:40

## 2024-05-13 RX ADMIN — Medication 200 MCG: at 14:08

## 2024-05-13 RX ADMIN — Medication 500 MCG: at 14:12

## 2024-05-13 RX ADMIN — ALBUTEROL SULFATE 2.5 MG: 2.5 SOLUTION RESPIRATORY (INHALATION) at 01:21

## 2024-05-13 RX ADMIN — ROCURONIUM BROMIDE 20 MG: 10 INJECTION, SOLUTION INTRAVENOUS at 13:35

## 2024-05-13 RX ADMIN — HYDROCORTISONE SODIUM SUCCINATE 50 MG: 100 INJECTION, POWDER, FOR SOLUTION INTRAMUSCULAR; INTRAVENOUS at 05:47

## 2024-05-13 RX ADMIN — Medication 200 MCG: at 13:40

## 2024-05-13 RX ADMIN — CEFAZOLIN SODIUM 2000 MG: 2 SOLUTION INTRAVENOUS at 11:19

## 2024-05-13 RX ADMIN — DEXTROSE 200 ML/HR: 5 SOLUTION INTRAVENOUS at 10:49

## 2024-05-13 RX ADMIN — Medication 400 MCG: at 12:09

## 2024-05-13 RX ADMIN — VASOPRESSIN 0.02 UNITS/MIN: 20 INJECTION INTRAVENOUS at 11:39

## 2024-05-13 RX ADMIN — SODIUM BICARBONATE 100 MEQ: 84 INJECTION, SOLUTION INTRAVENOUS at 14:21

## 2024-05-13 RX ADMIN — SODIUM CHLORIDE 1000 MG: 0.9 INJECTION, SOLUTION INTRAVENOUS at 11:57

## 2024-05-13 RX ADMIN — POTASSIUM PHOSPHATE, MONOBASIC POTASSIUM PHOSPHATE, DIBASIC 9 MMOL: 224; 236 INJECTION, SOLUTION, CONCENTRATE INTRAVENOUS at 10:57

## 2024-05-13 RX ADMIN — SODIUM CHLORIDE: 0.9 INJECTION, SOLUTION INTRAVENOUS at 12:37

## 2024-05-13 RX ADMIN — SODIUM BICARBONATE 100 MEQ: 84 INJECTION, SOLUTION INTRAVENOUS at 14:31

## 2024-05-13 RX ADMIN — MAGNESIUM SULFATE HEPTAHYDRATE 2 G: 40 INJECTION, SOLUTION INTRAVENOUS at 14:14

## 2024-05-13 RX ADMIN — ROCURONIUM BROMIDE 30 MG: 10 INJECTION, SOLUTION INTRAVENOUS at 13:33

## 2024-05-13 RX ADMIN — ALBUTEROL SULFATE 2.5 MG: 2.5 SOLUTION RESPIRATORY (INHALATION) at 05:00

## 2024-05-13 RX ADMIN — SODIUM CHLORIDE 1000 MG: 0.9 INJECTION, SOLUTION INTRAVENOUS at 05:39

## 2024-05-13 RX ADMIN — CEFTAZIDIME 2000 MG: 1 INJECTION, POWDER, FOR SOLUTION INTRAMUSCULAR; INTRAVENOUS at 11:56

## 2024-05-13 RX ADMIN — ALBUMIN (HUMAN) 25 G: 0.25 INJECTION, SOLUTION INTRAVENOUS at 01:51

## 2024-05-13 RX ADMIN — ROCURONIUM BROMIDE 50 MG: 10 INJECTION, SOLUTION INTRAVENOUS at 12:38

## 2024-05-13 RX ADMIN — CEFAZOLIN 1000 MG: 1 INJECTION, POWDER, FOR SOLUTION INTRAMUSCULAR; INTRAVENOUS at 14:41

## 2024-05-13 RX ADMIN — AMPHOTERICIN B 50 MG: 50 INJECTION, POWDER, LYOPHILIZED, FOR SOLUTION INTRAVENOUS at 14:41

## 2024-05-13 RX ADMIN — Medication 200 MCG: at 13:55

## 2024-05-13 RX ADMIN — LEVOTHYROXINE SODIUM ANHYDROUS 40 MCG/HR: 100 INJECTION, POWDER, LYOPHILIZED, FOR SOLUTION INTRAVENOUS at 04:42

## 2024-05-13 NOTE — ANESTHESIA POSTPROCEDURE EVALUATION
Post-Op Assessment Note            No anethesia notable event occurred.    Staff: Anesthesiologist           NA, organ donation      BP      Temp      Pulse     Resp      SpO2        
Attending and PA/NP shared services statement (NON-critical care):

## 2024-05-13 NOTE — PROGRESS NOTES
"Critical care attending documentation   Aleah Newton 53 y.o. female MRN: [id number]  Unit/Bed#: ICU 09 Encounter: [id number] PCP: Celsa Mayo MD    Patient was supported in the ICU for planned organ recovery today.    Visit Vitals  /53   Pulse (!) 116   Temp 97.5 °F (36.4 °C)   Resp 14   Ht 5' 6\" (1.676 m)   Wt 87.1 kg (192 lb)   LMP 09/12/2022 Comment: no period for 18 months   SpO2 99%   BMI 30.99 kg/m²   OB Status Postmenopausal   Smoking Status Never   BSA 1.97 m²     GEN: Unresponsive  HEENT: Endotracheal tube in place  CV: RRR, no murmur  Resp:  CTA, no R/R/W  GI: soft,NT/ND  : urinary catheter in place  Neuro: GCS 3 T  Skin: warm, dry    All laboratory data and imaging reviewed    Right frontal temporal ICH with significant brain compression and effacement of basilar cisterns with mass effect left to right shift-initial NIH 27, ICH 3  Acute hypoxic respiratory failure  Hypertensive emergency  Platelet dysfunction secondary to dual antiplatelet therapy  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  Bradycardia  Hypothermia postop  Hypernatremia-secondary to hypertonic saline  Metabolic acidosis-nongap secondary to hypertonic saline  Hypomagnesemia-replete and recheck  Hypokalemia-replete and recheck  Hypophosphatemia-replete and recheck  Anemia    Goal systolic blood pressure:  Normotensive with mean arterial pressure greater than 75     Respiratory support:  Assist-control rate 12, , FiO2 60%, PEEP 6     I/O + 1.9 L  UO 5.1 L       Cerebral edema/brain compression interventions:  Decompressive hemicraniectomy 5/10/2024     Hemodynamic support:  Hydrocortisone 50 mg IV every 6 hours  Marck-Synephrine infusion-currently held   vasopressin 0.02 units/min     Devices:  5/10/2024 left subclavian triple-lumen catheter  5/10/2024 right scalp surgical drain  5/10/2024 OG tube  5/10/2024 urinary catheter  5/10/2024 endotracheal tube  5/10/2024 left radial arterial line     Patient " with tachycardia this morning.  She had mean arterial pressures in the 50s approximately 7 AM.  Chest x-ray yesterday without acute pulmonary disease.  Patient's sodium is increased 152 and chloride of 118 with potassium of 3.2.  Glucose elevated at 188.  Patient received 1 unit RBCs yesterday.  She had been transitioned from half-normal saline to quarter normal saline yesterday.  Patient may benefit from free water/D5 infusion considering her urine output, increased sodium and likely DI with progression to brain death.    Continue with organ support with vasopressin currently at 0.02 units/min.  Phenylephrine available for hemodynamic support to maintain mean arterial blood pressure goal.  Continue with levothyroxine infusion.    Start D5 200ml / hr with Na 152. Or 12 noon    CC time 32 min

## 2024-05-13 NOTE — RESPIRATORY THERAPY NOTE
RT Ventilator Management Note  Aleah Newton 53 y.o. female MRN: [id number]  Unit/Bed#: ICU 09 Encounter: [id number]      Daily Screen         5/12/2024 0721 5/13/2024  0703          Patient safety screen outcome:: Failed Failed (P)       Not Ready for Weaning due to:: Underline problem not resolved Underline problem not resolved (P)                 Physical Exam:   Assessment Type: (P) Assess only  General Appearance: (P) Sleeping  Respiratory Pattern: (P) Assisted  Chest Assessment: (P) Chest expansion symmetrical  Bilateral Breath Sounds: (P) Clear  O2 Device: (P) C3      Resp Comments: (P) Recieved pt on SCMV vent settings, no vent changes made at this time.

## 2024-05-13 NOTE — UTILIZATION REVIEW
NOTIFICATION OF INPATIENT ADMISSION      AUTHORIZATION REQUEST   SERVICING FACILITY:   [facility]  Address: [address] [place] [address]  Tax ID: [id number]  NPI: [id number] ATTENDING PROVIDER:  Attending Name and NPI#: Maddie Turner Do [[id number]]  Address: [address] [place] [address]  Phone: [phone]   ADMISSION INFORMATION:  Place of Service: Inpatient [facility]  Place of Service Code: 21  Inpatient Admission Date/Time: 5/10/24  8:26 AM  Discharge Date/Time: No discharge date for patient encounter.  Admitting Diagnosis Code/Description:  Stroke (HCC) [I63.9]     UTILIZATION REVIEW CONTACT:  Latasha Lyn Utilization   Network Utilization Review Department  Phone: [phone]  Fax: [phone]  Email: [email or web address]  Contact for approvals/pending authorizations, clinical reviews, and discharge.     PHYSICIAN ADVISORY SERVICES:  Medical Necessity Denial & Ollm-bd-Llmp Review  Phone: [phone]  Fax: [phone]  Email: [email or web address]     DISCHARGE SUPPORT TEAM:  For Patients Discharge Needs & Updates  Phone: [phone] opt. 2 Fax: [phone]  Email: [email or web address]

## 2024-05-14 LAB
ABO GROUP BLD BPU: NORMAL
BACTERIA SPT RESP CULT: NO GROWTH
BPU ID: NORMAL
CROSSMATCH: NORMAL
GRAM STN SPEC: NORMAL
LDH SERPL-CCNC: 177 IU/L (ref 119–226)
LDH SERPL-CCNC: 206 IU/L (ref 119–226)
LDH SERPL-CCNC: 207 IU/L (ref 119–226)
LDH1 CFR SERPL ELPH: 31 % (ref 17–32)
LDH1 CFR SERPL ELPH: 32 % (ref 17–32)
LDH1 CFR SERPL ELPH: 32 % (ref 17–32)
LDH2 CFR SERPL ELPH: 36 % (ref 25–40)
LDH2 CFR SERPL ELPH: 37 % (ref 25–40)
LDH2 CFR SERPL ELPH: 37 % (ref 25–40)
LDH3 CFR SERPL ELPH: 19 % (ref 17–27)
LDH3 CFR SERPL ELPH: 20 % (ref 17–27)
LDH3 CFR SERPL ELPH: 20 % (ref 17–27)
LDH4 CFR SERPL ELPH: 6 % (ref 5–13)
LDH4 CFR SERPL ELPH: 6 % (ref 5–13)
LDH4 CFR SERPL ELPH: 7 % (ref 5–13)
LDH5 CFR SERPL ELPH: 5 % (ref 4–20)
LDH5 CFR SERPL ELPH: 6 % (ref 4–20)
LDH5 CFR SERPL ELPH: 6 % (ref 4–20)
UNIT DISPENSE STATUS: NORMAL
UNIT PRODUCT CODE: NORMAL
UNIT PRODUCT VOLUME: 300 ML
UNIT PRODUCT VOLUME: 350 ML
UNIT RH: NORMAL

## 2024-05-14 NOTE — UTILIZATION REVIEW
NOTIFICATION OF ADMISSION DISCHARGE   This is a Notification of Discharge from [facility]. Please be advised that this patient has been discharge from our facility. Below you will find the admission and discharge date and time including the patient’s disposition.   UTILIZATION REVIEW CONTACT:  Latasha Lyn  Utilization   Network Utilization Review Department  Phone: [phone] x carefully listen to the prompts. All voicemails are confidential.  Email: [email or web address]     ADMISSION INFORMATION  PRESENTATION DATE: 5/10/2024  8:26 AM  OBERVATION ADMISSION DATE:   INPATIENT ADMISSION DATE: 5/10/24  8:26 AM   DISCHARGE DATE: 2024  5:20 PM   DISPOSITION:    Network Utilization Review Department  ATTENTION: Please call with any questions or concerns to [phone] and carefully listen to the prompts so that you are directed to the right person. All voicemails are confidential.   For Discharge needs, contact Care Management DC Support Team at [phone] opt. 2  Send all requests for admission clinical reviews, approved or denied determinations and any other requests to dedicated fax number below belonging to the campus where the patient is receiving treatment. List of dedicated fax numbers for the Facilities:  FACILITY NAME UR FAX NUMBER   ADMISSION DENIALS (Administrative/Medical Necessity) [phone]   DISCHARGE SUPPORT TEAM ([facility]) [phone]   PARENT CHILD HEALTH (Maternity/NICU/Pediatrics) [phone]   [facility] [phone]   [facility] [phone]   [facility] [phone]   [facility] [phone]   [facility] [phone]   [facility] [phone]   [facility] [phone]   [facility] [phone]   [facility]  [facility] [phone]   [facility] [phone]   [facility] [phone]   [facility] [phone]

## 2024-05-15 LAB
LDH SERPL-CCNC: 202 IU/L (ref 119–226)
LDH1 CFR SERPL ELPH: 29 % (ref 17–32)
LDH2 CFR SERPL ELPH: 36 % (ref 25–40)
LDH3 CFR SERPL ELPH: 20 % (ref 17–27)
LDH4 CFR SERPL ELPH: 7 % (ref 5–13)
LDH5 CFR SERPL ELPH: 8 % (ref 4–20)

## (undated) DEVICE — 3M™ IOBAN™ 2 ANTIMICROBIAL INCISE DRAPE 6640EZ: Brand: IOBAN™ 2

## (undated) DEVICE — RECIP.STERNUM SAW BLADE 34/7.5/0.7MM: Brand: AESCULAP

## (undated) DEVICE — CHLORAPREP HI-LITE 26ML ORANGE

## (undated) DEVICE — SUT ETHIBOND 2 LR/LR 30 IN X496T

## (undated) DEVICE — 3M™ IOBAN™ 2 ANTIMICROBIAL INCISE DRAPE 6651EZ: Brand: IOBAN™ 2

## (undated) DEVICE — TELFA ADHESIVE ISLAND DRESSING: Brand: TELFA

## (undated) DEVICE — HEAVY DUTY TABLE COVER: Brand: CONVERTORS

## (undated) DEVICE — POOLE SUCTION HANDLE: Brand: CARDINAL HEALTH

## (undated) DEVICE — TOOL MR8-9BA50 MR8 9CM BALL 5MM: Brand: MIDAS REX MR8

## (undated) DEVICE — PROXIMATE RELOADABLE LINEAR CUTTER WITH SAFETY LOCK-OUT, 75MM: Brand: PROXIMATE

## (undated) DEVICE — INTENDED FOR TISSUE SEPARATION, AND OTHER PROCEDURES THAT REQUIRE A SHARP SURGICAL BLADE TO PUNCTURE OR CUT.: Brand: BARD-PARKER SAFETY BLADES SIZE 10, STERILE

## (undated) DEVICE — SKN PRP WNG SPNGE PVP SCRB STR: Brand: MEDLINE INDUSTRIES, INC.

## (undated) DEVICE — GELFOAM 100

## (undated) DEVICE — INTENDED FOR TISSUE SEPARATION, AND OTHER PROCEDURES THAT REQUIRE A SHARP SURGICAL BLADE TO PUNCTURE OR CUT.: Brand: BARD-PARKER SAFETY BLADES SIZE 15, STERILE

## (undated) DEVICE — CATH DIAG 5FR IMPULSE 100CM FL4

## (undated) DEVICE — PACK CRANIOTOMY PBDS RF

## (undated) DEVICE — TOOL MR8-F2/7TA23 MR8 F2/7CM TAPER 2.3MM: Brand: MIDAS REX MR8

## (undated) DEVICE — SHROUD KIT ADULT DISP

## (undated) DEVICE — PROXIMATE LINEAR CUTTER RELOAD, BLUE, 75MM: Brand: PROXIMATE

## (undated) DEVICE — CATH DIAG 5FR IMPULSE 100CM FR4

## (undated) DEVICE — RANEY SCALP CLIP STERILE: Brand: AESCULAP

## (undated) DEVICE — SURGICEL FIBRILLAR 1 X 2

## (undated) DEVICE — SUT SILK 2-0 18 IN A185H

## (undated) DEVICE — DRAPE INTESTINAL ISOLATION BAG

## (undated) DEVICE — DGW .035 FC J3MM 150CM TEF: Brand: EMERALD

## (undated) DEVICE — ANTIBACTERIAL VIOLET BRAIDED (POLYGLACTIN 910), SYNTHETIC ABSORBABLE SUTURE: Brand: COATED VICRYL

## (undated) DEVICE — SPECIMEN CONTAINER STERILE PEEL PACK

## (undated) DEVICE — HEMOSTATIC MATRIX SURGIFLO 8ML W/THROMBIN

## (undated) DEVICE — GLOVE INDICATOR PI UNDERGLOVE SZ 8.5 BLUE

## (undated) DEVICE — BONE WAX WHITE: Brand: BONE WAX WHITE

## (undated) DEVICE — BETHLEHEM MAJOR GENERAL PACK: Brand: CARDINAL HEALTH

## (undated) DEVICE — LIGACLIP MCA MULTIPLE CLIP APPLIERS, 20 LARGE CLIPS: Brand: LIGACLIP

## (undated) DEVICE — ASTOUND STANDARD SURGICAL GOWN, XXL: Brand: CONVERTORS

## (undated) DEVICE — DRAPE SHEET THREE QUARTER

## (undated) DEVICE — SUT SILK 0 30 IN A306H

## (undated) DEVICE — INTENDED FOR TISSUE SEPARATION, AND OTHER PROCEDURES THAT REQUIRE A SHARP SURGICAL BLADE TO PUNCTURE OR CUT.: Brand: BARD-PARKER ® CARBON RIB-BACK BLADES

## (undated) DEVICE — PROXIMATE PLUS MD MULTI-DIRECTIONAL RELEASE SKIN STAPLERS CONTAINS 35 STAINLESS STEEL STAPLES APPROXIMATE CLOSED DIMENSIONS: 6.9MM X 3.9MM WIDE: Brand: PROXIMATE

## (undated) DEVICE — TOWEL SET X-RAY

## (undated) DEVICE — PINNACLE INTRODUCER SHEATH: Brand: PINNACLE

## (undated) DEVICE — SUT SILK 4-0 RB-1 18IN C054D

## (undated) DEVICE — GLOVE SRG BIOGEL ORTHOPEDIC 8

## (undated) DEVICE — DGW .035 FC J3MM 260CM TEF: Brand: EMERALD

## (undated) DEVICE — PENCIL ELECTROSURG E-Z CLEAN -0035H

## (undated) DEVICE — MEDI-VAC YANK SUCT HNDL W/TPRD BULBOUS TIP: Brand: CARDINAL HEALTH

## (undated) DEVICE — NEEDLE 23G X 1 1/2 SAFETY-GLIDE THIN WALL

## (undated) DEVICE — SUT ETHILON 3-0 FSLX 30 IN 1673H

## (undated) DEVICE — TUBING SUCTION 5MM X 12 FT

## (undated) DEVICE — UMBILICAL TAPE: Brand: DEROYAL